# Patient Record
Sex: MALE | Race: BLACK OR AFRICAN AMERICAN | Employment: FULL TIME | URBAN - METROPOLITAN AREA
[De-identification: names, ages, dates, MRNs, and addresses within clinical notes are randomized per-mention and may not be internally consistent; named-entity substitution may affect disease eponyms.]

---

## 2017-01-19 ENCOUNTER — TELEPHONE (OUTPATIENT)
Dept: INTERNAL MEDICINE CLINIC | Age: 47
End: 2017-01-19

## 2017-01-19 ENCOUNTER — HOSPITAL ENCOUNTER (OUTPATIENT)
Dept: OTHER | Age: 47
Discharge: HOME | End: 2017-01-19
Attending: INTERNAL MEDICINE | Admitting: INTERNAL MEDICINE

## 2017-01-19 DIAGNOSIS — M10.9 ACUTE GOUT OF RIGHT FOOT, UNSPECIFIED CAUSE: ICD-10-CM

## 2017-01-19 DIAGNOSIS — M79.671 FOOT PAIN, RIGHT: Primary | ICD-10-CM

## 2017-01-20 ENCOUNTER — OFFICE VISIT (OUTPATIENT)
Dept: INTERNAL MEDICINE CLINIC | Age: 47
End: 2017-01-20

## 2017-01-20 VITALS
DIASTOLIC BLOOD PRESSURE: 98 MMHG | HEART RATE: 84 BPM | WEIGHT: 244 LBS | BODY MASS INDEX: 33.09 KG/M2 | SYSTOLIC BLOOD PRESSURE: 152 MMHG

## 2017-01-20 DIAGNOSIS — Z79.52 LONG TERM (CURRENT) USE OF SYSTEMIC STEROIDS: ICD-10-CM

## 2017-01-20 DIAGNOSIS — F33.41 MAJOR DEPRESSIVE DISORDER, RECURRENT, IN PARTIAL REMISSION (HCC): ICD-10-CM

## 2017-01-20 DIAGNOSIS — E11.9 TYPE 2 DIABETES MELLITUS WITHOUT COMPLICATION, WITHOUT LONG-TERM CURRENT USE OF INSULIN (HCC): ICD-10-CM

## 2017-01-20 DIAGNOSIS — F32.A DEPRESSION, UNSPECIFIED DEPRESSION TYPE: ICD-10-CM

## 2017-01-20 DIAGNOSIS — M10.9 GOUT INVOLVING TOE, UNSPECIFIED CAUSE, UNSPECIFIED CHRONICITY, UNSPECIFIED LATERALITY: Primary | ICD-10-CM

## 2017-01-20 DIAGNOSIS — D86.9 SARCOIDOSIS: ICD-10-CM

## 2017-01-20 LAB
A/G RATIO: 1.4 (ref 1.1–2.2)
ALBUMIN SERPL-MCNC: 4 G/DL (ref 3.4–5)
ALP BLD-CCNC: 128 U/L (ref 40–129)
ALT SERPL-CCNC: 23 U/L (ref 10–40)
ANION GAP SERPL CALCULATED.3IONS-SCNC: 15 MMOL/L (ref 3–16)
AST SERPL-CCNC: 20 U/L (ref 15–37)
BILIRUB SERPL-MCNC: <0.2 MG/DL (ref 0–1)
BUN BLDV-MCNC: 9 MG/DL (ref 7–20)
CALCIUM SERPL-MCNC: 9.4 MG/DL (ref 8.3–10.6)
CHLORIDE BLD-SCNC: 104 MMOL/L (ref 99–110)
CO2: 24 MMOL/L (ref 21–32)
CREAT SERPL-MCNC: 0.7 MG/DL (ref 0.9–1.3)
GFR AFRICAN AMERICAN: >60
GFR NON-AFRICAN AMERICAN: >60
GLOBULIN: 2.8 G/DL
GLUCOSE BLD-MCNC: 108 MG/DL (ref 70–99)
GLUCOSE BLD-MCNC: 116 MG/DL
POTASSIUM SERPL-SCNC: 4.6 MMOL/L (ref 3.5–5.1)
SODIUM BLD-SCNC: 143 MMOL/L (ref 136–145)
TOTAL PROTEIN: 6.8 G/DL (ref 6.4–8.2)
URIC ACID, SERUM: 5.7 MG/DL (ref 3.5–7.2)

## 2017-01-20 PROCEDURE — 82962 GLUCOSE BLOOD TEST: CPT | Performed by: INTERNAL MEDICINE

## 2017-01-20 PROCEDURE — 99214 OFFICE O/P EST MOD 30 MIN: CPT | Performed by: INTERNAL MEDICINE

## 2017-01-20 RX ORDER — LANCETS 30 GAUGE
EACH MISCELLANEOUS
Qty: 50 EACH | Refills: 5 | Status: SHIPPED | OUTPATIENT
Start: 2017-01-20 | End: 2017-04-21 | Stop reason: ALTCHOICE

## 2017-01-20 RX ORDER — MONTELUKAST SODIUM 10 MG/1
10 TABLET ORAL NIGHTLY
Qty: 30 TABLET | Refills: 5 | Status: SHIPPED | OUTPATIENT
Start: 2017-01-20 | End: 2017-04-21 | Stop reason: SDUPTHER

## 2017-01-21 ASSESSMENT — ENCOUNTER SYMPTOMS
GASTROINTESTINAL NEGATIVE: 1
RESPIRATORY NEGATIVE: 1
EYES NEGATIVE: 1
ALLERGIC/IMMUNOLOGIC NEGATIVE: 1

## 2017-01-23 ENCOUNTER — TELEPHONE (OUTPATIENT)
Dept: INTERNAL MEDICINE CLINIC | Age: 47
End: 2017-01-23

## 2017-01-23 DIAGNOSIS — D86.9 SARCOIDOSIS: ICD-10-CM

## 2017-01-23 RX ORDER — PREDNISONE 10 MG/1
10 TABLET ORAL DAILY
Qty: 90 TABLET | Refills: 1 | Status: SHIPPED | OUTPATIENT
Start: 2017-01-23 | End: 2017-08-25 | Stop reason: SDUPTHER

## 2017-02-13 RX ORDER — PROMETHAZINE HYDROCHLORIDE AND CODEINE PHOSPHATE 6.25; 1 MG/5ML; MG/5ML
SYRUP ORAL
Qty: 280 ML | Refills: 0 | OUTPATIENT
Start: 2017-02-13

## 2017-02-20 ENCOUNTER — TELEPHONE (OUTPATIENT)
Dept: INTERNAL MEDICINE CLINIC | Age: 47
End: 2017-02-20

## 2017-02-24 RX ORDER — PROMETHAZINE HYDROCHLORIDE AND CODEINE PHOSPHATE 6.25; 1 MG/5ML; MG/5ML
SYRUP ORAL
Qty: 280 ML | Refills: 0 | Status: SHIPPED | OUTPATIENT
Start: 2017-02-24 | End: 2017-03-31 | Stop reason: SDUPTHER

## 2017-03-10 ENCOUNTER — TELEPHONE (OUTPATIENT)
Dept: INTERNAL MEDICINE CLINIC | Age: 47
End: 2017-03-10

## 2017-03-10 RX ORDER — CEFDINIR 300 MG/1
300 CAPSULE ORAL 2 TIMES DAILY
Qty: 20 CAPSULE | Refills: 0 | Status: SHIPPED | OUTPATIENT
Start: 2017-03-10 | End: 2017-03-20

## 2017-03-31 RX ORDER — PROMETHAZINE HYDROCHLORIDE AND CODEINE PHOSPHATE 6.25; 1 MG/5ML; MG/5ML
SYRUP ORAL
Qty: 280 ML | Refills: 0 | Status: SHIPPED | OUTPATIENT
Start: 2017-03-31 | End: 2017-04-21 | Stop reason: SDUPTHER

## 2017-04-21 ENCOUNTER — OFFICE VISIT (OUTPATIENT)
Dept: INTERNAL MEDICINE CLINIC | Age: 47
End: 2017-04-21

## 2017-04-21 VITALS
WEIGHT: 235 LBS | RESPIRATION RATE: 16 BRPM | BODY MASS INDEX: 31.87 KG/M2 | HEART RATE: 72 BPM | SYSTOLIC BLOOD PRESSURE: 128 MMHG | DIASTOLIC BLOOD PRESSURE: 68 MMHG

## 2017-04-21 DIAGNOSIS — Z79.52 LONG TERM (CURRENT) USE OF SYSTEMIC STEROIDS: ICD-10-CM

## 2017-04-21 DIAGNOSIS — I10 ESSENTIAL HYPERTENSION: ICD-10-CM

## 2017-04-21 DIAGNOSIS — F32.A DEPRESSION, UNSPECIFIED DEPRESSION TYPE: ICD-10-CM

## 2017-04-21 DIAGNOSIS — D86.9 SARCOIDOSIS: ICD-10-CM

## 2017-04-21 DIAGNOSIS — M10.9 GOUT INVOLVING TOE OF RIGHT FOOT, UNSPECIFIED CAUSE, UNSPECIFIED CHRONICITY: Primary | ICD-10-CM

## 2017-04-21 DIAGNOSIS — E11.9 TYPE 2 DIABETES MELLITUS WITHOUT COMPLICATION, WITHOUT LONG-TERM CURRENT USE OF INSULIN (HCC): ICD-10-CM

## 2017-04-21 PROCEDURE — 99214 OFFICE O/P EST MOD 30 MIN: CPT | Performed by: INTERNAL MEDICINE

## 2017-04-21 RX ORDER — MONTELUKAST SODIUM 10 MG/1
10 TABLET ORAL NIGHTLY
Qty: 30 TABLET | Refills: 5 | Status: SHIPPED | OUTPATIENT
Start: 2017-04-21 | End: 2022-05-18

## 2017-04-21 RX ORDER — ATORVASTATIN CALCIUM 20 MG/1
20 TABLET, FILM COATED ORAL DAILY
Qty: 90 TABLET | Refills: 1 | Status: ON HOLD | OUTPATIENT
Start: 2017-04-21 | End: 2019-03-11 | Stop reason: HOSPADM

## 2017-04-21 RX ORDER — ALLOPURINOL 100 MG/1
100 TABLET ORAL DAILY
Qty: 30 TABLET | Refills: 3 | Status: SHIPPED | OUTPATIENT
Start: 2017-04-21

## 2017-04-21 RX ORDER — M-VIT,TX,IRON,MINS/CALC/FOLIC 27MG-0.4MG
1 TABLET ORAL DAILY
Qty: 30 TABLET | Refills: 11 | Status: ON HOLD | OUTPATIENT
Start: 2017-04-21 | End: 2019-03-11 | Stop reason: HOSPADM

## 2017-04-21 RX ORDER — OMEPRAZOLE 20 MG/1
20 CAPSULE, DELAYED RELEASE ORAL DAILY
Qty: 30 CAPSULE | Refills: 3 | Status: SHIPPED | OUTPATIENT
Start: 2017-04-21

## 2017-04-30 ASSESSMENT — ENCOUNTER SYMPTOMS
ALLERGIC/IMMUNOLOGIC NEGATIVE: 1
APNEA: 0
WHEEZING: 0
COUGH: 1
CHEST TIGHTNESS: 1
EYES NEGATIVE: 1
SHORTNESS OF BREATH: 1
GASTROINTESTINAL NEGATIVE: 1
STRIDOR: 0

## 2017-08-18 ENCOUNTER — OFFICE VISIT (OUTPATIENT)
Dept: INTERNAL MEDICINE CLINIC | Age: 47
End: 2017-08-18

## 2017-08-18 VITALS
HEART RATE: 82 BPM | SYSTOLIC BLOOD PRESSURE: 158 MMHG | WEIGHT: 218 LBS | DIASTOLIC BLOOD PRESSURE: 100 MMHG | RESPIRATION RATE: 12 BRPM | BODY MASS INDEX: 29.57 KG/M2

## 2017-08-18 DIAGNOSIS — E11.9 TYPE 2 DIABETES MELLITUS WITHOUT COMPLICATION, WITHOUT LONG-TERM CURRENT USE OF INSULIN (HCC): Primary | ICD-10-CM

## 2017-08-18 DIAGNOSIS — I10 ESSENTIAL HYPERTENSION: ICD-10-CM

## 2017-08-18 DIAGNOSIS — E78.2 MIXED HYPERLIPIDEMIA: ICD-10-CM

## 2017-08-18 DIAGNOSIS — F32.9 MAJOR DEPRESSION, CHRONIC: ICD-10-CM

## 2017-08-18 LAB
A/G RATIO: 1.4 (ref 1.1–2.2)
ALBUMIN SERPL-MCNC: 4.4 G/DL (ref 3.4–5)
ALP BLD-CCNC: 112 U/L (ref 40–129)
ALT SERPL-CCNC: 29 U/L (ref 10–40)
ANION GAP SERPL CALCULATED.3IONS-SCNC: 16 MMOL/L (ref 3–16)
AST SERPL-CCNC: 21 U/L (ref 15–37)
BASOPHILS ABSOLUTE: 0 K/UL (ref 0–0.2)
BASOPHILS RELATIVE PERCENT: 0.4 %
BILIRUB SERPL-MCNC: 0.3 MG/DL (ref 0–1)
BUN BLDV-MCNC: 14 MG/DL (ref 7–20)
CALCIUM SERPL-MCNC: 10 MG/DL (ref 8.3–10.6)
CHLORIDE BLD-SCNC: 105 MMOL/L (ref 99–110)
CHOLESTEROL, TOTAL: 239 MG/DL (ref 0–199)
CO2: 27 MMOL/L (ref 21–32)
CREAT SERPL-MCNC: 1 MG/DL (ref 0.9–1.3)
EOSINOPHILS ABSOLUTE: 0.1 K/UL (ref 0–0.6)
EOSINOPHILS RELATIVE PERCENT: 1 %
GFR AFRICAN AMERICAN: >60
GFR NON-AFRICAN AMERICAN: >60
GLOBULIN: 3.2 G/DL
GLUCOSE BLD-MCNC: 123 MG/DL (ref 70–99)
HCT VFR BLD CALC: 41.1 % (ref 40.5–52.5)
HDLC SERPL-MCNC: 78 MG/DL (ref 40–60)
HEMOGLOBIN: 13.6 G/DL (ref 13.5–17.5)
LDL CHOLESTEROL CALCULATED: 137 MG/DL
LYMPHOCYTES ABSOLUTE: 2.3 K/UL (ref 1–5.1)
LYMPHOCYTES RELATIVE PERCENT: 29.9 %
MCH RBC QN AUTO: 29.3 PG (ref 26–34)
MCHC RBC AUTO-ENTMCNC: 33.2 G/DL (ref 31–36)
MCV RBC AUTO: 88.4 FL (ref 80–100)
MONOCYTES ABSOLUTE: 0.4 K/UL (ref 0–1.3)
MONOCYTES RELATIVE PERCENT: 4.9 %
NEUTROPHILS ABSOLUTE: 5 K/UL (ref 1.7–7.7)
NEUTROPHILS RELATIVE PERCENT: 63.8 %
PDW BLD-RTO: 15.9 % (ref 12.4–15.4)
PLATELET # BLD: 293 K/UL (ref 135–450)
PMV BLD AUTO: 8 FL (ref 5–10.5)
POTASSIUM SERPL-SCNC: 4 MMOL/L (ref 3.5–5.1)
RBC # BLD: 4.64 M/UL (ref 4.2–5.9)
SODIUM BLD-SCNC: 148 MMOL/L (ref 136–145)
TOTAL PROTEIN: 7.6 G/DL (ref 6.4–8.2)
TRIGL SERPL-MCNC: 119 MG/DL (ref 0–150)
VLDLC SERPL CALC-MCNC: 24 MG/DL
WBC # BLD: 7.8 K/UL (ref 4–11)

## 2017-08-18 PROCEDURE — 99214 OFFICE O/P EST MOD 30 MIN: CPT | Performed by: INTERNAL MEDICINE

## 2017-08-18 ASSESSMENT — ENCOUNTER SYMPTOMS
EYES NEGATIVE: 1
GASTROINTESTINAL NEGATIVE: 1
ALLERGIC/IMMUNOLOGIC NEGATIVE: 1
RESPIRATORY NEGATIVE: 1

## 2017-08-19 LAB
ESTIMATED AVERAGE GLUCOSE: 128.4 MG/DL
HBA1C MFR BLD: 6.1 %

## 2017-08-25 ENCOUNTER — TELEPHONE (OUTPATIENT)
Dept: INTERNAL MEDICINE CLINIC | Age: 47
End: 2017-08-25

## 2017-08-25 DIAGNOSIS — D86.9 SARCOIDOSIS: ICD-10-CM

## 2017-08-25 RX ORDER — PREDNISONE 10 MG/1
10 TABLET ORAL DAILY
Qty: 90 TABLET | Refills: 1 | Status: ON HOLD | OUTPATIENT
Start: 2017-08-25 | End: 2019-03-11 | Stop reason: HOSPADM

## 2017-08-25 RX ORDER — PROMETHAZINE HYDROCHLORIDE AND CODEINE PHOSPHATE 6.25; 1 MG/5ML; MG/5ML
SYRUP ORAL
Qty: 280 ML | Refills: 0 | Status: SHIPPED | OUTPATIENT
Start: 2017-08-25 | End: 2019-03-08

## 2017-10-06 ENCOUNTER — OFFICE VISIT (OUTPATIENT)
Dept: INTERNAL MEDICINE CLINIC | Age: 47
End: 2017-10-06

## 2017-10-06 VITALS
BODY MASS INDEX: 29.8 KG/M2 | SYSTOLIC BLOOD PRESSURE: 148 MMHG | HEART RATE: 94 BPM | WEIGHT: 220 LBS | DIASTOLIC BLOOD PRESSURE: 100 MMHG | HEIGHT: 72 IN

## 2017-10-06 DIAGNOSIS — E11.9 TYPE 2 DIABETES MELLITUS WITHOUT COMPLICATION, WITHOUT LONG-TERM CURRENT USE OF INSULIN (HCC): ICD-10-CM

## 2017-10-06 DIAGNOSIS — F32.9 MAJOR DEPRESSION, CHRONIC: Primary | ICD-10-CM

## 2017-10-06 DIAGNOSIS — I10 ESSENTIAL HYPERTENSION: ICD-10-CM

## 2017-10-06 PROCEDURE — 99214 OFFICE O/P EST MOD 30 MIN: CPT | Performed by: INTERNAL MEDICINE

## 2017-10-06 ASSESSMENT — PATIENT HEALTH QUESTIONNAIRE - PHQ9
1. LITTLE INTEREST OR PLEASURE IN DOING THINGS: 3
3. TROUBLE FALLING OR STAYING ASLEEP: 2
8. MOVING OR SPEAKING SO SLOWLY THAT OTHER PEOPLE COULD HAVE NOTICED. OR THE OPPOSITE, BEING SO FIGETY OR RESTLESS THAT YOU HAVE BEEN MOVING AROUND A LOT MORE THAN USUAL: 0
6. FEELING BAD ABOUT YOURSELF - OR THAT YOU ARE A FAILURE OR HAVE LET YOURSELF OR YOUR FAMILY DOWN: 1
10. IF YOU CHECKED OFF ANY PROBLEMS, HOW DIFFICULT HAVE THESE PROBLEMS MADE IT FOR YOU TO DO YOUR WORK, TAKE CARE OF THINGS AT HOME, OR GET ALONG WITH OTHER PEOPLE: 2
SUM OF ALL RESPONSES TO PHQ QUESTIONS 1-9: 18
7. TROUBLE CONCENTRATING ON THINGS, SUCH AS READING THE NEWSPAPER OR WATCHING TELEVISION: 3
5. POOR APPETITE OR OVEREATING: 3
SUM OF ALL RESPONSES TO PHQ9 QUESTIONS 1 & 2: 3
9. THOUGHTS THAT YOU WOULD BE BETTER OFF DEAD, OR OF HURTING YOURSELF: 3
4. FEELING TIRED OR HAVING LITTLE ENERGY: 3

## 2017-10-06 NOTE — MR AVS SNAPSHOT
After Visit Summary             Cristin Dugan   10/6/2017 11:00 AM   Office Visit    Description:  Male : 1970   Provider:  Allyssa Slaughter MD   Department:  [facility] Internal Medicine              Your Follow-Up and Future Appointments         Below is a list of your follow-up and future appointments. This may not be a complete list as you may have made appointments directly with providers that we are not aware of or your providers may have made some for you. Please call your providers to confirm appointments. It is important to keep your appointments. Please bring your current insurance card, photo ID, co-pay, and all medication bottles to your appointment. If self-pay, payment is expected at the time of service. Your To-Do List     Follow-Up    Return in about 3 weeks (around 10/27/2017). Information from Your Visit        Department     Name Address Phone Fax    [facility] [employer] [address] [place] [address] [phone]      You Were Seen for:         Comments    Major depression, chronic   [220687]         Vital Signs     Blood Pressure Pulse Height Weight Body Mass Index Smoking Status    148/100 94 6' (1.829 m) 220 lb (99.8 kg) 29.84 kg/m2 Current Some Day Smoker      Additional Information about your Body Mass Index (BMI)           Your BMI as listed above is considered overweight (25.0-29.9). BMI is an estimate of body fat, calculated from your height and weight. The higher your BMI, the greater your risk of heart disease, high blood pressure, type 2 diabetes, stroke, gallstones, arthritis, sleep apnea, and certain cancers. BMI is not perfect. It may overestimate body fat in athletes and people who are more muscular. If your body fat is high you can improve your BMI by decreasing your calorie intake and becoming more physically active.      Learn more at: [employer]

## 2017-10-22 ASSESSMENT — ENCOUNTER SYMPTOMS
GASTROINTESTINAL NEGATIVE: 1
EYES NEGATIVE: 1
RESPIRATORY NEGATIVE: 1
ALLERGIC/IMMUNOLOGIC NEGATIVE: 1

## 2017-10-22 NOTE — PROGRESS NOTES
Subjective:      Patient ID: Meliton Garcia is a 56 y.o. male. Diabetes   Associated symptoms include fatigue. Hypertension      This patient is here for his three-month followup for diabetes and hypertension. The patient also suffers from major depression. Today this patient is very tearful as he is remains  from his wife that he believes that she is proceeding with filing divorce papers. His wife remove him from the home and filed a restraining order against him His kids have not been able to see them and they will not take his phone calls. Because of his disability due to sarcoidosis he is not able to find employment. Since being away from home which the patient states has been almost 6 months, the patient is basically homeless he has  been staying with his sister with whom he does not get along with very well    Past Medical History:   Diagnosis Date    Bell's palsy 9/5/12    Chronic pain disorder 10/29/10    Depression 7/13/11    Diabetes mellitus type II 3/24/10    Gout 2/15/11    Hypertension 401.9    Long term (current) use of systemic steroids 01/20/2016    Mood swings ([facility] [address] 3/24/10    Sarcoidosis ([facility] [address] 3/7/10     Social History     Social History    Marital status:      Spouse name: N/A    Number of children: N/A    Years of education: N/A     Occupational History    unemployed      Social History Main Topics    Smoking status: Current Some Day Smoker     Packs/day: 0.10     Years: 4.00     Types: Cigarettes    Smokeless tobacco: Not on file    Alcohol use 0.0 oz/week    Drug use:       Comment: marijuana for pain and depression    Sexual activity: Not on file     Other Topics Concern    Not on file     Social History Narrative    No narrative on file       Review of Systems   Constitutional: Positive for activity change and fatigue. Negative for appetite change, chills, diaphoresis and fever. HENT: Negative. Eyes: Negative. Respiratory: Negative.

## 2017-10-24 ENCOUNTER — TELEPHONE (OUTPATIENT)
Dept: INTERNAL MEDICINE CLINIC | Age: 47
End: 2017-10-24

## 2017-10-24 ENCOUNTER — TELEPHONE (OUTPATIENT)
Dept: RHEUMATOLOGY | Age: 47
End: 2017-10-24

## 2017-10-24 RX ORDER — BROMPHENIRAMINE MALEATE, PSEUDOEPHEDRINE HYDROCHLORIDE, AND DEXTROMETHORPHAN HYDROBROMIDE 2; 30; 10 MG/5ML; MG/5ML; MG/5ML
5 SYRUP ORAL 4 TIMES DAILY PRN
Qty: 240 ML | Refills: 1 | COMMUNITY
Start: 2017-10-24 | End: 2017-11-07

## 2017-10-24 NOTE — TELEPHONE ENCOUNTER
Patient calling stating that he has a cough that started Saturday night. Cough,producing clear phlegm. Up all night coughing. Nasal congestion, no fever. No sore throat. Headache. Tried DayQuil, Caterina Cost Plus but it didn't help. States is still down so he had to cancel appt for tomorrow. Wants something called in because he has no way to get here. Can't get it fixed until November 15th. Will make an appt then.     [address] [place] [address]    Please advise [phone]

## 2019-03-07 ENCOUNTER — HOSPITAL ENCOUNTER (INPATIENT)
Age: 49
LOS: 3 days | Discharge: HOME | DRG: 690 | End: 2019-03-11
Attending: SPECIALIST | Admitting: SPECIALIST
Payer: MEDICARE

## 2019-03-07 DIAGNOSIS — N30.91 HEMORRHAGIC CYSTITIS: Primary | ICD-10-CM

## 2019-03-07 LAB
BASOPHILS ABSOLUTE: 0.1 K/UL (ref 0–0.2)
BASOPHILS RELATIVE PERCENT: 0.9 %
EOSINOPHILS ABSOLUTE: 0.1 K/UL (ref 0–0.6)
EOSINOPHILS RELATIVE PERCENT: 1.6 %
HCT VFR BLD CALC: 38.5 % (ref 40.5–52.5)
HEMOGLOBIN: 12.7 G/DL (ref 13.5–17.5)
LYMPHOCYTES ABSOLUTE: 3.7 K/UL (ref 1–5.1)
LYMPHOCYTES RELATIVE PERCENT: 39 %
MCH RBC QN AUTO: 30.3 PG (ref 26–34)
MCHC RBC AUTO-ENTMCNC: 32.9 G/DL (ref 31–36)
MCV RBC AUTO: 92.3 FL (ref 80–100)
MONOCYTES ABSOLUTE: 0.8 K/UL (ref 0–1.3)
MONOCYTES RELATIVE PERCENT: 8.3 %
NEUTROPHILS ABSOLUTE: 4.7 K/UL (ref 1.7–7.7)
NEUTROPHILS RELATIVE PERCENT: 50.2 %
PDW BLD-RTO: 15.4 % (ref 12.4–15.4)
PLATELET # BLD: 247 K/UL (ref 135–450)
PMV BLD AUTO: 7.9 FL (ref 5–10.5)
RBC # BLD: 4.18 M/UL (ref 4.2–5.9)
WBC # BLD: 9.4 K/UL (ref 4–11)

## 2019-03-07 PROCEDURE — 99284 EMERGENCY DEPT VISIT MOD MDM: CPT

## 2019-03-07 PROCEDURE — 87086 URINE CULTURE/COLONY COUNT: CPT

## 2019-03-07 PROCEDURE — 81001 URINALYSIS AUTO W/SCOPE: CPT

## 2019-03-07 PROCEDURE — 85025 COMPLETE CBC W/AUTO DIFF WBC: CPT

## 2019-03-07 PROCEDURE — 80053 COMPREHEN METABOLIC PANEL: CPT

## 2019-03-07 PROCEDURE — 51702 INSERT TEMP BLADDER CATH: CPT

## 2019-03-07 PROCEDURE — 83690 ASSAY OF LIPASE: CPT

## 2019-03-07 ASSESSMENT — PAIN SCALES - GENERAL: PAINLEVEL_OUTOF10: 10

## 2019-03-07 ASSESSMENT — PAIN DESCRIPTION - LOCATION: LOCATION: ABDOMEN

## 2019-03-08 PROBLEM — N39.0 UTI (URINARY TRACT INFECTION): Status: ACTIVE | Noted: 2019-03-08

## 2019-03-08 LAB
A/G RATIO: 1.3 (ref 1.1–2.2)
ALBUMIN SERPL-MCNC: 4 G/DL (ref 3.4–5)
ALP BLD-CCNC: 66 U/L (ref 40–129)
ALT SERPL-CCNC: 12 U/L (ref 10–40)
ANION GAP SERPL CALCULATED.3IONS-SCNC: 15 MMOL/L (ref 3–16)
AST SERPL-CCNC: 17 U/L (ref 15–37)
BACTERIA: ABNORMAL /HPF
BILIRUB SERPL-MCNC: 0.3 MG/DL (ref 0–1)
BILIRUBIN URINE: ABNORMAL
BLOOD, URINE: ABNORMAL
BUN BLDV-MCNC: 15 MG/DL (ref 7–20)
CALCIUM SERPL-MCNC: 9.6 MG/DL (ref 8.3–10.6)
CHLORIDE BLD-SCNC: 101 MMOL/L (ref 99–110)
CLARITY: ABNORMAL
CO2: 22 MMOL/L (ref 21–32)
COLOR: ABNORMAL
COMMENT UA: ABNORMAL
CREAT SERPL-MCNC: 0.8 MG/DL (ref 0.9–1.3)
EPITHELIAL CELLS, UA: 5 /HPF (ref 0–5)
GFR AFRICAN AMERICAN: >60
GFR NON-AFRICAN AMERICAN: >60
GLOBULIN: 3.2 G/DL
GLUCOSE BLD-MCNC: 121 MG/DL (ref 70–99)
GLUCOSE BLD-MCNC: 145 MG/DL (ref 70–99)
GLUCOSE URINE: NEGATIVE MG/DL
KETONES, URINE: ABNORMAL MG/DL
LEUKOCYTE ESTERASE, URINE: ABNORMAL
LIPASE: 29 U/L (ref 13–60)
MICROSCOPIC EXAMINATION: YES
NITRITE, URINE: POSITIVE
PERFORMED ON: ABNORMAL
PH UA: 6.5 (ref 5–8)
POTASSIUM SERPL-SCNC: 3.5 MMOL/L (ref 3.5–5.1)
PROTEIN UA: >=300 MG/DL
RBC UA: >900 /HPF (ref 0–4)
SODIUM BLD-SCNC: 138 MMOL/L (ref 136–145)
SPECIFIC GRAVITY UA: >=1.03 (ref 1–1.03)
TOTAL PROTEIN: 7.2 G/DL (ref 6.4–8.2)
URINE REFLEX TO CULTURE: YES
URINE TYPE: ABNORMAL
UROBILINOGEN, URINE: 2 E.U./DL
WBC UA: 19 /HPF (ref 0–5)

## 2019-03-08 PROCEDURE — 94760 N-INVAS EAR/PLS OXIMETRY 1: CPT

## 2019-03-08 PROCEDURE — 1200000000 HC SEMI PRIVATE

## 2019-03-08 PROCEDURE — 6360000002 HC RX W HCPCS: Performed by: SPECIALIST

## 2019-03-08 PROCEDURE — 6370000000 HC RX 637 (ALT 250 FOR IP): Performed by: PHYSICIAN ASSISTANT

## 2019-03-08 PROCEDURE — 6360000002 HC RX W HCPCS: Performed by: PHYSICIAN ASSISTANT

## 2019-03-08 PROCEDURE — 96365 THER/PROPH/DIAG IV INF INIT: CPT

## 2019-03-08 PROCEDURE — 2580000003 HC RX 258: Performed by: PHYSICIAN ASSISTANT

## 2019-03-08 PROCEDURE — 6370000000 HC RX 637 (ALT 250 FOR IP): Performed by: SPECIALIST

## 2019-03-08 PROCEDURE — 2580000003 HC RX 258: Performed by: SPECIALIST

## 2019-03-08 RX ORDER — HYDROCODONE BITARTRATE AND ACETAMINOPHEN 5; 325 MG/1; MG/1
2 TABLET ORAL EVERY 4 HOURS PRN
Status: DISCONTINUED | OUTPATIENT
Start: 2019-03-08 | End: 2019-03-11 | Stop reason: HOSPADM

## 2019-03-08 RX ORDER — HYDROCODONE BITARTRATE AND ACETAMINOPHEN 5; 325 MG/1; MG/1
1 TABLET ORAL EVERY 4 HOURS PRN
Status: DISCONTINUED | OUTPATIENT
Start: 2019-03-08 | End: 2019-03-11 | Stop reason: HOSPADM

## 2019-03-08 RX ORDER — PANTOPRAZOLE SODIUM 40 MG/1
40 TABLET, DELAYED RELEASE ORAL
Status: DISCONTINUED | OUTPATIENT
Start: 2019-03-08 | End: 2019-03-11 | Stop reason: HOSPADM

## 2019-03-08 RX ORDER — ALLOPURINOL 100 MG/1
100 TABLET ORAL DAILY
Status: DISCONTINUED | OUTPATIENT
Start: 2019-03-08 | End: 2019-03-11 | Stop reason: HOSPADM

## 2019-03-08 RX ORDER — HYDRALAZINE HYDROCHLORIDE 20 MG/ML
5 INJECTION INTRAMUSCULAR; INTRAVENOUS EVERY 6 HOURS PRN
Status: DISCONTINUED | OUTPATIENT
Start: 2019-03-08 | End: 2019-03-11 | Stop reason: HOSPADM

## 2019-03-08 RX ORDER — AMLODIPINE BESYLATE 10 MG/1
10 TABLET ORAL DAILY
Status: DISCONTINUED | OUTPATIENT
Start: 2019-03-08 | End: 2019-03-11 | Stop reason: HOSPADM

## 2019-03-08 RX ORDER — DEXTROSE AND SODIUM CHLORIDE 5; .45 G/100ML; G/100ML
INJECTION, SOLUTION INTRAVENOUS CONTINUOUS
Status: DISCONTINUED | OUTPATIENT
Start: 2019-03-08 | End: 2019-03-11 | Stop reason: HOSPADM

## 2019-03-08 RX ORDER — PREDNISONE 10 MG/1
10 TABLET ORAL DAILY
Status: DISCONTINUED | OUTPATIENT
Start: 2019-03-08 | End: 2019-03-11 | Stop reason: HOSPADM

## 2019-03-08 RX ORDER — M-VIT,TX,IRON,MINS/CALC/FOLIC 27MG-0.4MG
1 TABLET ORAL DAILY
Status: DISCONTINUED | OUTPATIENT
Start: 2019-03-08 | End: 2019-03-11 | Stop reason: HOSPADM

## 2019-03-08 RX ORDER — ATORVASTATIN CALCIUM 20 MG/1
20 TABLET, FILM COATED ORAL NIGHTLY
Status: DISCONTINUED | OUTPATIENT
Start: 2019-03-08 | End: 2019-03-11 | Stop reason: HOSPADM

## 2019-03-08 RX ORDER — MONTELUKAST SODIUM 10 MG/1
10 TABLET ORAL NIGHTLY
Status: DISCONTINUED | OUTPATIENT
Start: 2019-03-08 | End: 2019-03-11 | Stop reason: HOSPADM

## 2019-03-08 RX ORDER — ALBUTEROL SULFATE 90 UG/1
2 AEROSOL, METERED RESPIRATORY (INHALATION) EVERY 6 HOURS PRN
Status: DISCONTINUED | OUTPATIENT
Start: 2019-03-08 | End: 2019-03-11 | Stop reason: HOSPADM

## 2019-03-08 RX ORDER — AMLODIPINE BESYLATE 5 MG/1
10 TABLET ORAL ONCE
Status: COMPLETED | OUTPATIENT
Start: 2019-03-08 | End: 2019-03-08

## 2019-03-08 RX ADMIN — CEFTRIAXONE 1 G: 1 INJECTION, POWDER, FOR SOLUTION INTRAMUSCULAR; INTRAVENOUS at 00:57

## 2019-03-08 RX ADMIN — DEXTROSE AND SODIUM CHLORIDE: 5; 450 INJECTION, SOLUTION INTRAVENOUS at 04:47

## 2019-03-08 RX ADMIN — ATORVASTATIN CALCIUM 20 MG: 20 TABLET, FILM COATED ORAL at 21:07

## 2019-03-08 RX ADMIN — DEXTROSE AND SODIUM CHLORIDE: 5; 450 INJECTION, SOLUTION INTRAVENOUS at 14:39

## 2019-03-08 RX ADMIN — HYDRALAZINE HYDROCHLORIDE 5 MG: 20 INJECTION INTRAMUSCULAR; INTRAVENOUS at 10:28

## 2019-03-08 RX ADMIN — PREDNISONE 10 MG: 10 TABLET ORAL at 09:38

## 2019-03-08 RX ADMIN — AMLODIPINE BESYLATE 10 MG: 10 TABLET ORAL at 09:38

## 2019-03-08 RX ADMIN — HYDROCODONE BITARTRATE AND ACETAMINOPHEN 2 TABLET: 5; 325 TABLET ORAL at 14:39

## 2019-03-08 RX ADMIN — MONTELUKAST SODIUM 10 MG: 10 TABLET, FILM COATED ORAL at 21:07

## 2019-03-08 RX ADMIN — MULTIPLE VITAMINS W/ MINERALS TAB 1 TABLET: TAB at 09:38

## 2019-03-08 RX ADMIN — VORTIOXETINE 10 MG: 10 TABLET, FILM COATED ORAL at 09:38

## 2019-03-08 RX ADMIN — AMLODIPINE BESYLATE 10 MG: 5 TABLET ORAL at 00:38

## 2019-03-08 RX ADMIN — ALLOPURINOL 100 MG: 100 TABLET ORAL at 09:38

## 2019-03-08 RX ADMIN — HYDROCODONE BITARTRATE AND ACETAMINOPHEN 1 TABLET: 5; 325 TABLET ORAL at 21:57

## 2019-03-08 RX ADMIN — HYDROCODONE BITARTRATE AND ACETAMINOPHEN 2 TABLET: 5; 325 TABLET ORAL at 10:09

## 2019-03-08 ASSESSMENT — ENCOUNTER SYMPTOMS
ABDOMINAL PAIN: 1
NAUSEA: 0
SHORTNESS OF BREATH: 0
FACIAL SWELLING: 0
EYE DISCHARGE: 0
BACK PAIN: 0
EYE REDNESS: 0
APNEA: 0
VOMITING: 0
CHOKING: 0

## 2019-03-08 ASSESSMENT — PAIN SCALES - GENERAL
PAINLEVEL_OUTOF10: 8
PAINLEVEL_OUTOF10: 4
PAINLEVEL_OUTOF10: 7
PAINLEVEL_OUTOF10: 2
PAINLEVEL_OUTOF10: 10
PAINLEVEL_OUTOF10: 5
PAINLEVEL_OUTOF10: 7

## 2019-03-08 ASSESSMENT — PAIN DESCRIPTION - FREQUENCY: FREQUENCY: INTERMITTENT

## 2019-03-08 ASSESSMENT — PAIN DESCRIPTION - DESCRIPTORS: DESCRIPTORS: SHARP

## 2019-03-08 ASSESSMENT — PAIN DESCRIPTION - PAIN TYPE: TYPE: ACUTE PAIN

## 2019-03-08 ASSESSMENT — PAIN DESCRIPTION - LOCATION
LOCATION: ABDOMEN
LOCATION: ABDOMEN

## 2019-03-08 ASSESSMENT — PAIN DESCRIPTION - ONSET: ONSET: ON-GOING

## 2019-03-09 LAB — URINE CULTURE, ROUTINE: NORMAL

## 2019-03-09 PROCEDURE — 99233 SBSQ HOSP IP/OBS HIGH 50: CPT | Performed by: INTERNAL MEDICINE

## 2019-03-09 PROCEDURE — 6370000000 HC RX 637 (ALT 250 FOR IP): Performed by: INTERNAL MEDICINE

## 2019-03-09 PROCEDURE — 6360000002 HC RX W HCPCS: Performed by: SPECIALIST

## 2019-03-09 PROCEDURE — 2580000003 HC RX 258: Performed by: SPECIALIST

## 2019-03-09 PROCEDURE — 1200000000 HC SEMI PRIVATE

## 2019-03-09 PROCEDURE — 6370000000 HC RX 637 (ALT 250 FOR IP): Performed by: SPECIALIST

## 2019-03-09 PROCEDURE — 6360000002 HC RX W HCPCS: Performed by: INTERNAL MEDICINE

## 2019-03-09 RX ORDER — ONDANSETRON 2 MG/ML
4 INJECTION INTRAMUSCULAR; INTRAVENOUS EVERY 6 HOURS PRN
Status: DISCONTINUED | OUTPATIENT
Start: 2019-03-09 | End: 2019-03-11 | Stop reason: HOSPADM

## 2019-03-09 RX ADMIN — PANTOPRAZOLE SODIUM 40 MG: 40 TABLET, DELAYED RELEASE ORAL at 06:53

## 2019-03-09 RX ADMIN — CEFTRIAXONE 1 G: 1 INJECTION, POWDER, FOR SOLUTION INTRAMUSCULAR; INTRAVENOUS at 23:08

## 2019-03-09 RX ADMIN — DEXTROSE AND SODIUM CHLORIDE: 5; 450 INJECTION, SOLUTION INTRAVENOUS at 00:21

## 2019-03-09 RX ADMIN — ATORVASTATIN CALCIUM 20 MG: 20 TABLET, FILM COATED ORAL at 20:05

## 2019-03-09 RX ADMIN — CEFTRIAXONE 1 G: 1 INJECTION, POWDER, FOR SOLUTION INTRAMUSCULAR; INTRAVENOUS at 00:19

## 2019-03-09 RX ADMIN — AMLODIPINE BESYLATE 10 MG: 10 TABLET ORAL at 08:20

## 2019-03-09 RX ADMIN — MONTELUKAST SODIUM 10 MG: 10 TABLET, FILM COATED ORAL at 20:05

## 2019-03-09 RX ADMIN — MAGNESIUM HYDROXIDE 30 ML: 400 SUSPENSION ORAL at 12:49

## 2019-03-09 RX ADMIN — MULTIPLE VITAMINS W/ MINERALS TAB 1 TABLET: TAB at 08:19

## 2019-03-09 RX ADMIN — DEXTROSE AND SODIUM CHLORIDE: 5; 450 INJECTION, SOLUTION INTRAVENOUS at 11:23

## 2019-03-09 RX ADMIN — DEXTROSE AND SODIUM CHLORIDE: 5; 450 INJECTION, SOLUTION INTRAVENOUS at 20:10

## 2019-03-09 RX ADMIN — HYDROCODONE BITARTRATE AND ACETAMINOPHEN 2 TABLET: 5; 325 TABLET ORAL at 20:05

## 2019-03-09 RX ADMIN — ALLOPURINOL 100 MG: 100 TABLET ORAL at 08:19

## 2019-03-09 RX ADMIN — PREDNISONE 10 MG: 10 TABLET ORAL at 08:20

## 2019-03-09 RX ADMIN — ONDANSETRON 4 MG: 2 INJECTION INTRAMUSCULAR; INTRAVENOUS at 12:49

## 2019-03-09 RX ADMIN — VORTIOXETINE 10 MG: 10 TABLET, FILM COATED ORAL at 08:47

## 2019-03-09 RX ADMIN — HYDROCODONE BITARTRATE AND ACETAMINOPHEN 1 TABLET: 5; 325 TABLET ORAL at 11:22

## 2019-03-09 ASSESSMENT — PAIN SCALES - GENERAL
PAINLEVEL_OUTOF10: 6
PAINLEVEL_OUTOF10: 6
PAINLEVEL_OUTOF10: 4
PAINLEVEL_OUTOF10: 3

## 2019-03-10 LAB
A/G RATIO: 1.2 (ref 1.1–2.2)
ALBUMIN SERPL-MCNC: 4 G/DL (ref 3.4–5)
ALP BLD-CCNC: 78 U/L (ref 40–129)
ALT SERPL-CCNC: 10 U/L (ref 10–40)
ANION GAP SERPL CALCULATED.3IONS-SCNC: 14 MMOL/L (ref 3–16)
AST SERPL-CCNC: 11 U/L (ref 15–37)
BILIRUB SERPL-MCNC: 0.4 MG/DL (ref 0–1)
BUN BLDV-MCNC: 6 MG/DL (ref 7–20)
CALCIUM SERPL-MCNC: 9.2 MG/DL (ref 8.3–10.6)
CHLORIDE BLD-SCNC: 100 MMOL/L (ref 99–110)
CO2: 24 MMOL/L (ref 21–32)
CREAT SERPL-MCNC: 0.7 MG/DL (ref 0.9–1.3)
GFR AFRICAN AMERICAN: >60
GFR NON-AFRICAN AMERICAN: >60
GLOBULIN: 3.4 G/DL
GLUCOSE BLD-MCNC: 114 MG/DL (ref 70–99)
HCT VFR BLD CALC: 41.9 % (ref 40.5–52.5)
HEMOGLOBIN: 14.1 G/DL (ref 13.5–17.5)
MCH RBC QN AUTO: 30.7 PG (ref 26–34)
MCHC RBC AUTO-ENTMCNC: 33.6 G/DL (ref 31–36)
MCV RBC AUTO: 91.5 FL (ref 80–100)
PDW BLD-RTO: 15.1 % (ref 12.4–15.4)
PLATELET # BLD: 264 K/UL (ref 135–450)
PMV BLD AUTO: 7.5 FL (ref 5–10.5)
POTASSIUM SERPL-SCNC: 3.6 MMOL/L (ref 3.5–5.1)
RBC # BLD: 4.58 M/UL (ref 4.2–5.9)
SODIUM BLD-SCNC: 138 MMOL/L (ref 136–145)
TOTAL PROTEIN: 7.4 G/DL (ref 6.4–8.2)
WBC # BLD: 9.2 K/UL (ref 4–11)

## 2019-03-10 PROCEDURE — 2580000003 HC RX 258: Performed by: SPECIALIST

## 2019-03-10 PROCEDURE — 99232 SBSQ HOSP IP/OBS MODERATE 35: CPT | Performed by: INTERNAL MEDICINE

## 2019-03-10 PROCEDURE — 51798 US URINE CAPACITY MEASURE: CPT

## 2019-03-10 PROCEDURE — 6370000000 HC RX 637 (ALT 250 FOR IP): Performed by: SPECIALIST

## 2019-03-10 PROCEDURE — 1200000000 HC SEMI PRIVATE

## 2019-03-10 PROCEDURE — 85027 COMPLETE CBC AUTOMATED: CPT

## 2019-03-10 PROCEDURE — 80053 COMPREHEN METABOLIC PANEL: CPT

## 2019-03-10 PROCEDURE — 6370000000 HC RX 637 (ALT 250 FOR IP): Performed by: UROLOGY

## 2019-03-10 PROCEDURE — 6360000002 HC RX W HCPCS: Performed by: SPECIALIST

## 2019-03-10 RX ORDER — BISACODYL 10 MG
10 SUPPOSITORY, RECTAL RECTAL DAILY PRN
Status: DISCONTINUED | OUTPATIENT
Start: 2019-03-10 | End: 2019-03-11 | Stop reason: HOSPADM

## 2019-03-10 RX ADMIN — BISACODYL 10 MG: 10 SUPPOSITORY RECTAL at 21:25

## 2019-03-10 RX ADMIN — PANTOPRAZOLE SODIUM 40 MG: 40 TABLET, DELAYED RELEASE ORAL at 05:42

## 2019-03-10 RX ADMIN — MULTIPLE VITAMINS W/ MINERALS TAB 1 TABLET: TAB at 08:30

## 2019-03-10 RX ADMIN — PREDNISONE 10 MG: 10 TABLET ORAL at 08:30

## 2019-03-10 RX ADMIN — HYDROCODONE BITARTRATE AND ACETAMINOPHEN 2 TABLET: 5; 325 TABLET ORAL at 10:14

## 2019-03-10 RX ADMIN — ALLOPURINOL 100 MG: 100 TABLET ORAL at 08:30

## 2019-03-10 RX ADMIN — DEXTROSE AND SODIUM CHLORIDE: 5; 450 INJECTION, SOLUTION INTRAVENOUS at 08:31

## 2019-03-10 RX ADMIN — AMLODIPINE BESYLATE 10 MG: 10 TABLET ORAL at 08:30

## 2019-03-10 RX ADMIN — CEFTRIAXONE 1 G: 1 INJECTION, POWDER, FOR SOLUTION INTRAMUSCULAR; INTRAVENOUS at 23:14

## 2019-03-10 RX ADMIN — ATORVASTATIN CALCIUM 20 MG: 20 TABLET, FILM COATED ORAL at 21:24

## 2019-03-10 RX ADMIN — MONTELUKAST SODIUM 10 MG: 10 TABLET, FILM COATED ORAL at 21:24

## 2019-03-10 RX ADMIN — VORTIOXETINE 10 MG: 10 TABLET, FILM COATED ORAL at 08:30

## 2019-03-10 ASSESSMENT — PAIN DESCRIPTION - DESCRIPTORS: DESCRIPTORS: ACHING

## 2019-03-10 ASSESSMENT — PAIN DESCRIPTION - LOCATION: LOCATION: ABDOMEN;HEAD

## 2019-03-10 ASSESSMENT — PAIN DESCRIPTION - ORIENTATION: ORIENTATION: LOWER

## 2019-03-10 ASSESSMENT — PAIN SCALES - GENERAL
PAINLEVEL_OUTOF10: 0
PAINLEVEL_OUTOF10: 7
PAINLEVEL_OUTOF10: 7
PAINLEVEL_OUTOF10: 4

## 2019-03-10 ASSESSMENT — PAIN DESCRIPTION - ONSET: ONSET: GRADUAL

## 2019-03-10 ASSESSMENT — PAIN DESCRIPTION - PAIN TYPE: TYPE: ACUTE PAIN

## 2019-03-10 ASSESSMENT — PAIN DESCRIPTION - FREQUENCY: FREQUENCY: INTERMITTENT

## 2019-03-11 VITALS
HEIGHT: 72 IN | BODY MASS INDEX: 25.35 KG/M2 | WEIGHT: 187.17 LBS | TEMPERATURE: 98.6 F | OXYGEN SATURATION: 98 % | RESPIRATION RATE: 17 BRPM | SYSTOLIC BLOOD PRESSURE: 135 MMHG | DIASTOLIC BLOOD PRESSURE: 88 MMHG | HEART RATE: 89 BPM

## 2019-03-11 PROCEDURE — 6370000000 HC RX 637 (ALT 250 FOR IP): Performed by: SPECIALIST

## 2019-03-11 RX ORDER — ALBUTEROL SULFATE 90 UG/1
2 AEROSOL, METERED RESPIRATORY (INHALATION) EVERY 6 HOURS PRN
Qty: 1 INHALER | Refills: 3 | Status: SHIPPED | OUTPATIENT
Start: 2019-03-11 | End: 2019-04-03

## 2019-03-11 RX ORDER — BISACODYL 10 MG
10 SUPPOSITORY, RECTAL RECTAL DAILY PRN
Qty: 30 SUPPOSITORY | Refills: 1 | Status: SHIPPED | OUTPATIENT
Start: 2019-03-11 | End: 2019-04-03

## 2019-03-11 RX ORDER — ATORVASTATIN CALCIUM 20 MG/1
20 TABLET, FILM COATED ORAL NIGHTLY
Qty: 30 TABLET | Refills: 3 | Status: SHIPPED | OUTPATIENT
Start: 2019-03-11 | End: 2019-04-03

## 2019-03-11 RX ORDER — M-VIT,TX,IRON,MINS/CALC/FOLIC 27MG-0.4MG
1 TABLET ORAL DAILY
Qty: 30 TABLET | Refills: 1 | Status: SHIPPED | OUTPATIENT
Start: 2019-03-11 | End: 2019-04-03

## 2019-03-11 RX ADMIN — VORTIOXETINE 10 MG: 10 TABLET, FILM COATED ORAL at 08:08

## 2019-03-11 RX ADMIN — PREDNISONE 10 MG: 10 TABLET ORAL at 08:08

## 2019-03-11 RX ADMIN — ALLOPURINOL 100 MG: 100 TABLET ORAL at 08:08

## 2019-03-11 RX ADMIN — PANTOPRAZOLE SODIUM 40 MG: 40 TABLET, DELAYED RELEASE ORAL at 08:08

## 2019-03-11 RX ADMIN — AMLODIPINE BESYLATE 10 MG: 10 TABLET ORAL at 08:08

## 2019-03-11 RX ADMIN — MULTIPLE VITAMINS W/ MINERALS TAB 1 TABLET: TAB at 08:08

## 2019-03-12 ENCOUNTER — CARE COORDINATION (OUTPATIENT)
Dept: CASE MANAGEMENT | Age: 49
End: 2019-03-12

## 2019-04-02 ENCOUNTER — CARE COORDINATION (OUTPATIENT)
Dept: CARE COORDINATION | Age: 49
End: 2019-04-02

## 2019-04-03 NOTE — PROGRESS NOTES
[address]  time_130 pt states___________        Surgery time____________    Take the following medications with a sip of water: bp medication only    Do not eat or drink anything after 12:00 midnight prior to your surgery. This includes water chewing gum, mints and ice chips. You may brush your teeth and gargle the morning of your surgery, but do not swallow the water     Please see your family doctor/pediatrician for a history and physical and/or concerning medications. Bring any test results/reports from your physicians office. If you are under the care of a heart doctor or specialist doctor, please be aware that you may be asked to them for clearance    You may be asked to stop blood thinners such as Coumadin, Plavix, Fragmin, Lovenox, etc., or any anti-inflammatories such as:  Aspirin, Ibuprofen, Advil, Naproxen prior to your surgery. We also ask that you stop any OTC medications such as fish oil, vitamin E, glucosamine, garlic, Multivitamins, COQ 10, etc.    We ask that you do not smoke 24 hours prior to surgery  We ask that you do not  drink any alcoholic beverages 24 hours prior to surgery     You must make arrangements for a responsible adult to take you home after your surgery. For your safety you will not be allowed to leave alone or drive yourself home. Your surgery will be cancelled if you do not have a ride home. Also for your safety, it is strongly suggested that someone stay with you the first 24 hours after your surgery. A parent or legal guardian must accompany a child scheduled for surgery and plan to stay at the hospital until the child is discharged. Please do not bring other children with you. For your comfort, please wear simple loose fitting clothing to the hospital.  Please do not bring valuables.     Do not wear any make-up or nail polish on your fingers or toes      For your safety, please do not wear any jewelry or body piercing's on the day of surgery. All jewelry must be removed. If you have dentures, they will be removed before going to operating room. For your convenience, we will provide you with a container. If you wear contact lenses or glasses, they will be removed, please bring a case for them. If you have a living will and a durable power of  for healthcare, please bring in a copy. As part of our patient safety program to minimize surgical site infections, we ask you to do the following:    · Please notify your surgeon if you develop any illness between         now and the  day of your surgery. · This includes a cough, cold, fever, sore throat, nausea,         or vomiting, and diarrhea, etc.  ·  Please notify your surgeon if you experience dizziness, shortness         of breath or blurred vision between now and the time of your surgery. Do not shave your operative site 96 hours prior to surgery. For face and neck surgery, men may use an electric razor 48 hours   prior to surgery. You may shower the night before surgery or the morning of   your surgery with an antibacterial soap. You will need to bring a photo ID and insurance card    [facility] has an onsite pharmacy, would you like to utilize our pharmacy     If you will be staying overnight and use a C-pap machine, please bring   your C-pap to hospital     Our goal is to provide you with excellent care, therefore, visitors will be limited to two(2) in the room at a time so that we may focus on providing this care for you. Please contact pre-admission testing if you have any further questions. [facility] phone number:  [phone] PAT fax number:  [phone]  Please note these are generalized instructions for all surgical cases, you may be provided with more specific instructions according to your surgery.

## 2019-04-05 ENCOUNTER — ANESTHESIA EVENT (OUTPATIENT)
Dept: OPERATING ROOM | Age: 49
End: 2019-04-05
Payer: MEDICARE

## 2019-04-07 PROBLEM — N39.0 UTI (URINARY TRACT INFECTION): Status: RESOLVED | Noted: 2019-03-08 | Resolved: 2019-04-07

## 2019-04-08 ENCOUNTER — ANESTHESIA (OUTPATIENT)
Dept: OPERATING ROOM | Age: 49
End: 2019-04-08
Payer: MEDICARE

## 2019-04-08 ENCOUNTER — HOSPITAL ENCOUNTER (OUTPATIENT)
Dept: CT IMAGING | Age: 49
Discharge: HOME | End: 2019-04-08
Payer: MEDICARE

## 2019-04-08 ENCOUNTER — HOSPITAL ENCOUNTER (OUTPATIENT)
Age: 49
Setting detail: OUTPATIENT SURGERY
Discharge: HOME | End: 2019-04-08
Attending: UROLOGY | Admitting: UROLOGY
Payer: MEDICARE

## 2019-04-08 VITALS
HEART RATE: 55 BPM | BODY MASS INDEX: 25.9 KG/M2 | HEIGHT: 72 IN | OXYGEN SATURATION: 98 % | WEIGHT: 191.25 LBS | TEMPERATURE: 98 F | SYSTOLIC BLOOD PRESSURE: 160 MMHG | RESPIRATION RATE: 16 BRPM | DIASTOLIC BLOOD PRESSURE: 90 MMHG

## 2019-04-08 VITALS — SYSTOLIC BLOOD PRESSURE: 139 MMHG | DIASTOLIC BLOOD PRESSURE: 76 MMHG | OXYGEN SATURATION: 88 %

## 2019-04-08 DIAGNOSIS — R31.0 GROSS HEMATURIA: ICD-10-CM

## 2019-04-08 DIAGNOSIS — R39.198 DIFFICULTY URINATING: ICD-10-CM

## 2019-04-08 DIAGNOSIS — R33.9 BLADDER RETENTION OF URINE: ICD-10-CM

## 2019-04-08 PROCEDURE — 6360000002 HC RX W HCPCS: Performed by: NURSE ANESTHETIST, CERTIFIED REGISTERED

## 2019-04-08 PROCEDURE — 6360000002 HC RX W HCPCS: Performed by: UROLOGY

## 2019-04-08 PROCEDURE — 7100000000 HC PACU RECOVERY - FIRST 15 MIN: Performed by: UROLOGY

## 2019-04-08 PROCEDURE — 7100000011 HC PHASE II RECOVERY - ADDTL 15 MIN: Performed by: UROLOGY

## 2019-04-08 PROCEDURE — 7100000001 HC PACU RECOVERY - ADDTL 15 MIN: Performed by: UROLOGY

## 2019-04-08 PROCEDURE — 74178 CT ABD&PLV WO CNTR FLWD CNTR: CPT

## 2019-04-08 PROCEDURE — 2580000003 HC RX 258: Performed by: UROLOGY

## 2019-04-08 PROCEDURE — 3600000004 HC SURGERY LEVEL 4 BASE: Performed by: UROLOGY

## 2019-04-08 PROCEDURE — 7100000010 HC PHASE II RECOVERY - FIRST 15 MIN: Performed by: UROLOGY

## 2019-04-08 PROCEDURE — 3700000001 HC ADD 15 MINUTES (ANESTHESIA): Performed by: UROLOGY

## 2019-04-08 PROCEDURE — 3600000014 HC SURGERY LEVEL 4 ADDTL 15MIN: Performed by: UROLOGY

## 2019-04-08 PROCEDURE — 6360000004 HC RX CONTRAST MEDICATION: Performed by: UROLOGY

## 2019-04-08 PROCEDURE — 2500000003 HC RX 250 WO HCPCS: Performed by: NURSE ANESTHETIST, CERTIFIED REGISTERED

## 2019-04-08 PROCEDURE — 2709999900 HC NON-CHARGEABLE SUPPLY: Performed by: UROLOGY

## 2019-04-08 PROCEDURE — 2580000003 HC RX 258: Performed by: ANESTHESIOLOGY

## 2019-04-08 PROCEDURE — 3700000000 HC ANESTHESIA ATTENDED CARE: Performed by: UROLOGY

## 2019-04-08 RX ORDER — SODIUM CHLORIDE 9 MG/ML
INJECTION, SOLUTION INTRAVENOUS CONTINUOUS
Status: DISCONTINUED | OUTPATIENT
Start: 2019-04-08 | End: 2019-04-08 | Stop reason: HOSPADM

## 2019-04-08 RX ORDER — PROPOFOL 10 MG/ML
INJECTION, EMULSION INTRAVENOUS CONTINUOUS PRN
Status: DISCONTINUED | OUTPATIENT
Start: 2019-04-08 | End: 2019-04-08 | Stop reason: SDUPTHER

## 2019-04-08 RX ORDER — PREDNISONE 20 MG/1
20 TABLET ORAL DAILY
COMMUNITY

## 2019-04-08 RX ORDER — FENTANYL CITRATE 50 UG/ML
INJECTION, SOLUTION INTRAMUSCULAR; INTRAVENOUS PRN
Status: DISCONTINUED | OUTPATIENT
Start: 2019-04-08 | End: 2019-04-08 | Stop reason: SDUPTHER

## 2019-04-08 RX ORDER — SODIUM CHLORIDE 0.9 % (FLUSH) 0.9 %
10 SYRINGE (ML) INJECTION EVERY 12 HOURS SCHEDULED
Status: DISCONTINUED | OUTPATIENT
Start: 2019-04-08 | End: 2019-04-08 | Stop reason: HOSPADM

## 2019-04-08 RX ORDER — LABETALOL HYDROCHLORIDE 5 MG/ML
5 INJECTION, SOLUTION INTRAVENOUS EVERY 10 MIN PRN
Status: DISCONTINUED | OUTPATIENT
Start: 2019-04-08 | End: 2019-04-08 | Stop reason: HOSPADM

## 2019-04-08 RX ORDER — MIDAZOLAM HYDROCHLORIDE 1 MG/ML
INJECTION INTRAMUSCULAR; INTRAVENOUS PRN
Status: DISCONTINUED | OUTPATIENT
Start: 2019-04-08 | End: 2019-04-08 | Stop reason: SDUPTHER

## 2019-04-08 RX ORDER — FENTANYL CITRATE 50 UG/ML
25 INJECTION, SOLUTION INTRAMUSCULAR; INTRAVENOUS EVERY 5 MIN PRN
Status: DISCONTINUED | OUTPATIENT
Start: 2019-04-08 | End: 2019-04-08 | Stop reason: HOSPADM

## 2019-04-08 RX ORDER — PROMETHAZINE HYDROCHLORIDE 25 MG/ML
6.25 INJECTION, SOLUTION INTRAMUSCULAR; INTRAVENOUS
Status: DISCONTINUED | OUTPATIENT
Start: 2019-04-08 | End: 2019-04-08 | Stop reason: HOSPADM

## 2019-04-08 RX ORDER — LIDOCAINE HYDROCHLORIDE 20 MG/ML
INJECTION, SOLUTION EPIDURAL; INFILTRATION; INTRACAUDAL; PERINEURAL PRN
Status: DISCONTINUED | OUTPATIENT
Start: 2019-04-08 | End: 2019-04-08 | Stop reason: SDUPTHER

## 2019-04-08 RX ORDER — MAGNESIUM HYDROXIDE 1200 MG/15ML
LIQUID ORAL
Status: COMPLETED | OUTPATIENT
Start: 2019-04-08 | End: 2019-04-08

## 2019-04-08 RX ORDER — SODIUM CHLORIDE 0.9 % (FLUSH) 0.9 %
10 SYRINGE (ML) INJECTION PRN
Status: DISCONTINUED | OUTPATIENT
Start: 2019-04-08 | End: 2019-04-08 | Stop reason: HOSPADM

## 2019-04-08 RX ADMIN — FENTANYL CITRATE 50 MCG: 50 INJECTION INTRAMUSCULAR; INTRAVENOUS at 15:27

## 2019-04-08 RX ADMIN — MIDAZOLAM 2 MG: 1 INJECTION INTRAMUSCULAR; INTRAVENOUS at 15:16

## 2019-04-08 RX ADMIN — SODIUM CHLORIDE: 9 INJECTION, SOLUTION INTRAVENOUS at 14:45

## 2019-04-08 RX ADMIN — CEFTRIAXONE 2 G: 2 INJECTION, POWDER, FOR SOLUTION INTRAMUSCULAR; INTRAVENOUS at 15:16

## 2019-04-08 RX ADMIN — FENTANYL CITRATE 50 MCG: 50 INJECTION INTRAMUSCULAR; INTRAVENOUS at 15:22

## 2019-04-08 RX ADMIN — IOPAMIDOL 75 ML: 755 INJECTION, SOLUTION INTRAVENOUS at 13:45

## 2019-04-08 RX ADMIN — PROPOFOL 200 MCG/KG/MIN: 10 INJECTION, EMULSION INTRAVENOUS at 15:22

## 2019-04-08 RX ADMIN — LIDOCAINE HYDROCHLORIDE 60 MG: 20 INJECTION, SOLUTION EPIDURAL; INFILTRATION; INTRACAUDAL; PERINEURAL at 15:22

## 2019-04-08 ASSESSMENT — PAIN DESCRIPTION - LOCATION
LOCATION: HEAD
LOCATION: HEAD

## 2019-04-08 ASSESSMENT — PULMONARY FUNCTION TESTS
PIF_VALUE: 0

## 2019-04-08 ASSESSMENT — PAIN SCALES - GENERAL
PAINLEVEL_OUTOF10: 0
PAINLEVEL_OUTOF10: 1
PAINLEVEL_OUTOF10: 0
PAINLEVEL_OUTOF10: 0
PAINLEVEL_OUTOF10: 2

## 2019-04-08 ASSESSMENT — PAIN DESCRIPTION - DESCRIPTORS
DESCRIPTORS: ACHING

## 2019-04-08 ASSESSMENT — PAIN - FUNCTIONAL ASSESSMENT
PAIN_FUNCTIONAL_ASSESSMENT: ACTIVITIES ARE NOT PREVENTED
PAIN_FUNCTIONAL_ASSESSMENT: 0-10
PAIN_FUNCTIONAL_ASSESSMENT: ACTIVITIES ARE NOT PREVENTED

## 2019-04-08 ASSESSMENT — PAIN DESCRIPTION - ONSET
ONSET: ON-GOING
ONSET: ON-GOING

## 2019-04-08 ASSESSMENT — PAIN DESCRIPTION - PAIN TYPE
TYPE: CHRONIC PAIN
TYPE: CHRONIC PAIN

## 2019-04-08 ASSESSMENT — PAIN DESCRIPTION - ORIENTATION
ORIENTATION: ANTERIOR
ORIENTATION: ANTERIOR

## 2019-04-08 ASSESSMENT — PAIN DESCRIPTION - PROGRESSION
CLINICAL_PROGRESSION: NOT CHANGED
CLINICAL_PROGRESSION: GRADUALLY IMPROVING

## 2019-04-08 ASSESSMENT — PAIN DESCRIPTION - FREQUENCY
FREQUENCY: CONTINUOUS
FREQUENCY: INTERMITTENT

## 2019-04-08 NOTE — BRIEF OP NOTE
Brief Postoperative Note  ______________________________________________________________    Patient: Adriano Bain  YOB: 1970  MRN: [id number]  Date of Procedure: 4/8/2019    Pre-Op Diagnosis: GROSS HEMATURIA    Post-Op Diagnosis: Same       Procedure(s):  CYSTOSCOPY    Anesthesia: Monitor Anesthesia Care    Surgeon(s):  Tod Rendon MD    Assistant: none    Estimated Blood Loss (mL): less than 50     Complications: None    Specimens:   * No specimens in log *    Implants:  * No implants in log *      Drains: none    Findings: normal    Sanjeev Roger MD  Date: 4/8/2019  Time: 3:35 PM

## 2019-04-08 NOTE — PROGRESS NOTES
Arrived in pre op for Cystoscopy; came from C-T scan; Saline lock placed right antecubital in C-T; flushes easily and infusion of saline started.

## 2019-04-08 NOTE — ANESTHESIA PRE PROCEDURE
[facility] Department of Anesthesiology  Pre-Anesthesia Evaluation/Consultation       Name:  Carlos Green  : 1970  Age:  53 y.o. MRN:  [id number]  Date: 2019           Surgeon: Surgeon(s):  Pat Brady MD    Procedure: Procedure(s):  CYSTOSCOPY     No Known Allergies  Patient Active Problem List   Diagnosis    Depression    Sarcoidosis    Bell's palsy    Sarcoidosis    Depression    Hypertension    Gout    Chronic pain disorder    Mood swings    Diabetes mellitus, type II ([facility] [address]    Long term (current) use of systemic steroids    Hemorrhagic cystitis    Constipation    Nausea     Past Medical History:   Diagnosis Date    Arthritis     Asthma     Bell's palsy 12    Cerebral artery occlusion with cerebral infarction ([facility] [address]     over 12years; states no physical residual, but poor memory    Chronic pain disorder 10/29/10    Depression 11    Gout 02/15/2011    toe    Hx of blood clots     Hyperlipidemia     Hypertension 401.9    Long term (current) use of systemic steroids 2016    Mood swings 3/24/10    Sarcoidosis 3/7/10     Past Surgical History:   Procedure Laterality Date    ENDOSCOPY, COLON, DIAGNOSTIC       Social History     Tobacco Use    Smoking status: Never Smoker    Smokeless tobacco: Never Used   Substance Use Topics    Alcohol use: Not Currently     Alcohol/week: 0.0 oz    Drug use: Yes     Types: Marijuana     Comment: marijuana for pain and depression--pt states he smoked  this morning- 4/3/19     Medications  No current facility-administered medications on file prior to encounter.       Current Outpatient Medications on File Prior to Encounter   Medication Sig Dispense Refill    predniSONE (DELTASONE) 20 MG tablet Take 20 mg by mouth daily      allopurinol (ZYLOPRIM) 100 MG tablet Take 1 tablet by mouth daily 30 tablet 3    omeprazole (PRILOSEC) 20 MG delayed release capsule Take 1 capsule by mouth 03/10/2019     03/10/2019     CMP    Lab Results   Component Value Date     03/10/2019    K 3.6 03/10/2019     03/10/2019    CO2 24 03/10/2019    BUN 6 03/10/2019    CREATININE 0.7 03/10/2019    GFRAA >60 03/10/2019    GFRAA >60 05/30/2013    AGRATIO 1.2 03/10/2019    LABGLOM >60 03/10/2019    GLUCOSE 114 03/10/2019    PROT 7.4 03/10/2019    PROT 7.0 11/02/2012    CALCIUM 9.2 03/10/2019    BILITOT 0.4 03/10/2019    ALKPHOS 78 03/10/2019    AST 11 03/10/2019    ALT 10 03/10/2019     BMP    Lab Results   Component Value Date     03/10/2019    K 3.6 03/10/2019     03/10/2019    CO2 24 03/10/2019    BUN 6 03/10/2019    CREATININE 0.7 03/10/2019    CALCIUM 9.2 03/10/2019    GFRAA >60 03/10/2019    GFRAA >60 05/30/2013    LABGLOM >60 03/10/2019    GLUCOSE 114 03/10/2019     POCGlucose  No results for input(s): GLUCOSE in the last 72 hours.    Coags  No results found for: PROTIME, INR, APTT  HCG (If Applicable) No results found for: PREGTESTUR, PREGSERUM, HCG, HCGQUANT   ABGs No results found for: PHART, PO2ART, AAI4HGT, GTI9HOI, BEART, D8OJOXHK   Type & Screen (If Applicable)  No results found for: LABABO, LABRH                         BMI: Wt Readings from Last 3 Encounters:       NPO Status:8 hours                          Anesthesia Evaluation  Patient summary reviewed no history of anesthetic complications:   Airway: Mallampati: III  TM distance: >3 FB   Neck ROM: full   Dental:          Pulmonary:normal exam    (+) asthma:                           ROS comment: Sarcoidosis on prednisone     Cardiovascular:  Exercise tolerance: poor (<4 METS),   (+) hypertension:,       ECG reviewed  Rhythm: regular  Rate: normal           Beta Blocker:  Not on Beta Blocker         Neuro/Psych:   (+) CVA (no residual effects per pt):, neuromuscular disease (Kenvil palsy hx):, psychiatric history:            GI/Hepatic/Renal:   (+) GERD:,           Endo/Other:    (+) DiabetesType II DM, , . Abdominal:           Vascular:   + DVT, . Anesthesia Plan      MAC     ASA 3       Induction: intravenous. MIPS: Postoperative opioids intended and Prophylactic antiemetics administered. Anesthetic plan and risks discussed with patient. Plan discussed with CRNA. This pre-anesthesia assessment may be used as a history and physical.    DOS STAFF ADDENDUM:    Pt seen and examined, chart reviewed (including anesthesia, drug and allergy history). No interval changes to history and physical examination. Anesthetic plan, risks, benefits, alternatives, and personnel involved discussed with patient. Patient verbalized an understanding and agrees to proceed.       Corina Hale MD  April 8, 2019  2:38 PM

## 2019-04-08 NOTE — H&P
Preoperative H&P Update    The patient's History and Physical in the medical record  was reviewed by me today. I reviewed the HPI, medications, allergies, reason for surgery, diagnosis and treatment plan and there has been no change.     Electronically signed by Uzma Menjivar MD on 4/8/2019 at 3:15 PM

## 2019-04-08 NOTE — PROGRESS NOTES
From PACU. Alert and oriented. Vss. C/o tolerable frontal headache which he has had, he says because he is hungry. Patient given po fluids and a sandwich.

## 2019-04-08 NOTE — PROGRESS NOTES
Alert and oriented. Says headache to tolerable at 1/10 now after eating. Vss. Has voided x2. Says he has mild, expected burning with urination, but no blood in the urine. watching tv. Ready for discharge, waiting for ride. Verbalizes understanding of discharge instructions.

## 2019-04-09 NOTE — OP NOTE
[address]                                OPERATIVE REPORT    PATIENT NAME: Nadiya Owens                   :        1970  MED REC NO:   [id number]                          ROOM:  ACCOUNT NO:   [de-identified]                           ADMIT DATE: 2019  PROVIDER:     Zulema Serrano MD      DATE OF PROCEDURE:  2019    PREOPERATIVE DIAGNOSIS:  Gross hematuria. POSTOPERATIVE DIAGNOSIS:  Gross hematuria. OPERATION PERFORMED:  Cystoscopy. SURGEON:  Zulema Serrano MD    ANESTHESIA:  MAC. FINDINGS:  Normal cystoscopy, slightly vascular prostate is most likely  source of gross hematuria. Normal CT urogram preoperatively. DRAINS:  None. SPECIMEN:  None. EBL:  Minimal.    COMPLICATIONS:  None immediate. DISPOSITION:  Stable to recovery. Discharged to home. Follow up as  needed. INDICATIONS:  The patient is a 79-year-old male who presented to the  hospital about one month ago with gross hematuria and presumed urinary  tract infection, although, his urine culture came back negative. His  urine was quite bloody and he was actually started on CBI and left in  the hospital for a couple of days on this. He was then able to void on  his own, but hematuria workup was instituted as his culture was  negative. A CT urogram was done earlier today and is essentially normal  with no source of gross hematuria noted. He had some very small and  nonobstructing bilateral renal calculi and some cysts, which were simple  cysts within the bilateral kidneys but not a source of bleeding. He  presents now for cystoscopy. OPERATIVE PROCEDURE:  The patient was properly identified in the  preoperative area and taken to the operating room and placed on the  table in supine position. MAC anesthesia was induced and the patient  was placed in the dorsal lithotomy position.   He was prepped and draped  in standard fashion. Antibiotics were given and a time-out was  performed. Rigid cystoscope was placed through the urethra and into the bladder. The bladder was surveyed in the usual fashion. Anterior urethra was  normal.  Prostatic urethra showed minimal obstruction, although, some  vascularity of the prostate with bleeding on insertion of the scope but  this was just very mild. Bilateral ureteral orifices were identified in  the expected position. The rest of the bladder again appeared slightly  vascular but with minimal trabeculation, otherwise, no tumors, stones,  trabeculation or inflammation. His bladder was drained, the scope was removed, and the procedure was  ended. The patient tolerated the procedure well and was taken to  recovery room in good condition. He will follow up as needed.         Avinash Quinteros MD    D: 04/08/2019 17:01:52       T: 04/08/2019 21:49:10     [email or web address]  Job#: [id number]     Doc#: [id number]    CC:

## 2019-04-16 ENCOUNTER — CARE COORDINATION (OUTPATIENT)
Dept: CASE MANAGEMENT | Age: 49
End: 2019-04-16

## 2019-04-16 NOTE — CARE COORDINATION
[facility] Transitions Follow Up Call    2019    Patient: Janine Mosquera  Patient : 1970   MRN: [id number]  Reason for Admission:   Discharge Date: 19 RARS: Readmission Risk Score: 8         Spoke with: Sebas Denton 24 Transitions Subsequent and Final Call    Subsequent and Final Calls  Do you have any ongoing symptoms?:  No  Have your medications changed?:  No  Do you have any questions related to your medications?:  No  Do you currently have any active services?:  No  Do you have any needs or concerns that I can assist you with?:  No  Care Transitions Interventions  Other Interventions:        States he's doing well. Denies hematuria, pain or burning with urination or difficulty urinating. Reports taking all medications as prescribed. Pt states he has an appt with PCP Dr. Ambar Cheng tomorrow and plans to attend. Denies any questions or concerns at this time. Follow Up  No future appointments.     Lala Naik

## 2019-04-27 ENCOUNTER — CARE COORDINATION (OUTPATIENT)
Dept: CARE COORDINATION | Age: 49
End: 2019-04-27

## 2019-04-27 NOTE — CARE COORDINATION
[facility] Transitions Follow Up Call    2019    Patient: Luciana Moctezuma  Patient : 1970   MRN: [id number]  Reason for Admission:   Discharge Date: 19 RARS: Readmission Risk Score: 8         Spoke with: Patient. Patient states he is doing well. No UTI problems. No C/O urinary burning, pain, frequency, fever, odor, color yellow. Patient has no needs or concerns for me at this time. Will Follow up at later time. Care Transitions Subsequent and Final Call    Subsequent and Final Calls  Do you have any ongoing symptoms?:  No  Have your medications changed?:  No  Do you have any questions related to your medications?:  No  Do you currently have any active services?:  No  Do you have any needs or concerns that I can assist you with?:  No  Identified Barriers:  None  Care Transitions Interventions  Other Interventions: Follow Up  No future appointments.     Loretta Amador LPN

## 2019-05-07 ENCOUNTER — CARE COORDINATION (OUTPATIENT)
Dept: CASE MANAGEMENT | Age: 49
End: 2019-05-07

## 2019-05-07 NOTE — CARE COORDINATION
[facility] Transitions Follow Up Call    2019    Patient: Carlitos Child  Patient : 1970   MRN: [id number]  Reason for Admission:   Discharge Date: 19 RARS: Readmission Risk Score: 8         Spoke with: Kelsey [address] Transitions Subsequent and Final Call    Subsequent and Final Calls  Do you have any ongoing symptoms?:  No  Have your medications changed?:  No  Do you have any questions related to your medications?:  No  Do you currently have any active services?:  No  Do you have any needs or concerns that I can assist you with?:  No  Identified Barriers:  None  Care Transitions Interventions  Other Interventions: Follow Up : Spoke briefly with patient who had been sleeping when I called. Pt said he has no difficulty urinating, no blood in his urine, and is doing well. Pt said he went for his follow up appt after his cystoscopy on  and it went well. Pt said he has no needs or questions for me today. Will continue to follow patient status. No future appointments.     Jose Munroe RN

## 2019-05-21 ENCOUNTER — CARE COORDINATION (OUTPATIENT)
Dept: CASE MANAGEMENT | Age: 49
End: 2019-05-21

## 2019-05-21 NOTE — CARE COORDINATION
[facility] Transitions Follow Up Call    2019    Patient: Kaylene Blizzard  Patient : 1970   MRN: [id number]  Reason for Admission: UTI  Discharge Date: 19 RARS: Readmission Risk Score: 8         Spoke with: PATRICIA JUNIOR Transitions Subsequent and Final Call    Subsequent and Final Calls  Do you have any ongoing symptoms?:  Yes  Patient-reported symptoms:  Other  Have your medications changed?:  No  Do you have any questions related to your medications?:  No  Do you currently have any active services?:  No  Do you have any needs or concerns that I can assist you with?:  No  Care Transitions Interventions  Other Interventions:        Pt states he's doing well. Reports he has pain in his lower abdomen every once in a while during urination. Denies any blood in urine, difficulty urinating, fevers or other symptoms. States he followed up with his doctor following his cystoscopy, and is due to return in 2 months. Denies questions or concerns at this time. Reports taking medications as prescribed. Follow Up  No future appointments.     Jocelyn Pemberton

## 2019-06-13 ENCOUNTER — CARE COORDINATION (OUTPATIENT)
Dept: CASE MANAGEMENT | Age: 49
End: 2019-06-13

## 2019-06-13 NOTE — CARE COORDINATION
[facility] Transitions Follow Up Call    2019    Patient: Erin Al  Patient : 1970   MRN: [id number]  Reason for Admission:  Discharge Date: 19 RARS: Readmission Risk Score: 8         Spoke with: Sebas Denton 24 Transitions Subsequent and Final Call    Subsequent and Final Calls  Care Transitions Interventions  Other Interventions:        States he's at work right now so can't talk, but is doing fine and denies questions or concerns at this time. Follow Up  No future appointments.     Marcie Ohms

## 2019-12-20 ENCOUNTER — HOSPITAL ENCOUNTER (EMERGENCY)
Age: 49
Discharge: HOME | End: 2019-12-20
Attending: EMERGENCY MEDICINE
Payer: MEDICARE

## 2019-12-20 VITALS
HEIGHT: 72 IN | SYSTOLIC BLOOD PRESSURE: 149 MMHG | HEART RATE: 73 BPM | BODY MASS INDEX: 26.16 KG/M2 | WEIGHT: 193.12 LBS | RESPIRATION RATE: 18 BRPM | DIASTOLIC BLOOD PRESSURE: 93 MMHG | OXYGEN SATURATION: 99 % | TEMPERATURE: 98 F

## 2019-12-20 DIAGNOSIS — N30.01 ACUTE CYSTITIS WITH HEMATURIA: Primary | ICD-10-CM

## 2019-12-20 LAB
A/G RATIO: 1.3 (ref 1.1–2.2)
ALBUMIN SERPL-MCNC: 4.1 G/DL (ref 3.4–5)
ALP BLD-CCNC: 67 U/L (ref 40–129)
ALT SERPL-CCNC: 17 U/L (ref 10–40)
ANION GAP SERPL CALCULATED.3IONS-SCNC: 13 MMOL/L (ref 3–16)
AST SERPL-CCNC: 16 U/L (ref 15–37)
BASOPHILS ABSOLUTE: 0.1 K/UL (ref 0–0.2)
BASOPHILS RELATIVE PERCENT: 0.7 %
BILIRUB SERPL-MCNC: <0.2 MG/DL (ref 0–1)
BILIRUBIN URINE: ABNORMAL
BLOOD, URINE: ABNORMAL
BUN BLDV-MCNC: 22 MG/DL (ref 7–20)
CALCIUM SERPL-MCNC: 9.5 MG/DL (ref 8.3–10.6)
CHLORIDE BLD-SCNC: 103 MMOL/L (ref 99–110)
CLARITY: ABNORMAL
CO2: 26 MMOL/L (ref 21–32)
COLOR: ABNORMAL
CREAT SERPL-MCNC: 1.1 MG/DL (ref 0.9–1.3)
EOSINOPHILS ABSOLUTE: 0.1 K/UL (ref 0–0.6)
EOSINOPHILS RELATIVE PERCENT: 1.6 %
GFR AFRICAN AMERICAN: >60
GFR NON-AFRICAN AMERICAN: >60
GLOBULIN: 3.2 G/DL
GLUCOSE BLD-MCNC: 109 MG/DL (ref 70–99)
GLUCOSE URINE: NEGATIVE MG/DL
HCT VFR BLD CALC: 38.7 % (ref 40.5–52.5)
HEMOGLOBIN: 12.9 G/DL (ref 13.5–17.5)
KETONES, URINE: ABNORMAL MG/DL
LEUKOCYTE ESTERASE, URINE: ABNORMAL
LYMPHOCYTES ABSOLUTE: 2.9 K/UL (ref 1–5.1)
LYMPHOCYTES RELATIVE PERCENT: 37.6 %
MCH RBC QN AUTO: 30.7 PG (ref 26–34)
MCHC RBC AUTO-ENTMCNC: 33.4 G/DL (ref 31–36)
MCV RBC AUTO: 91.8 FL (ref 80–100)
MICROSCOPIC EXAMINATION: YES
MONOCYTES ABSOLUTE: 0.5 K/UL (ref 0–1.3)
MONOCYTES RELATIVE PERCENT: 6.3 %
NEUTROPHILS ABSOLUTE: 4.1 K/UL (ref 1.7–7.7)
NEUTROPHILS RELATIVE PERCENT: 53.8 %
NITRITE, URINE: POSITIVE
PDW BLD-RTO: 14.8 % (ref 12.4–15.4)
PH UA: 7 (ref 5–8)
PLATELET # BLD: 264 K/UL (ref 135–450)
PMV BLD AUTO: 7.1 FL (ref 5–10.5)
POTASSIUM REFLEX MAGNESIUM: 3.8 MMOL/L (ref 3.5–5.1)
PROTEIN UA: >=300 MG/DL
RBC # BLD: 4.22 M/UL (ref 4.2–5.9)
RBC UA: >100 /HPF (ref 0–2)
SODIUM BLD-SCNC: 142 MMOL/L (ref 136–145)
SPECIFIC GRAVITY UA: 1.02 (ref 1–1.03)
TOTAL PROTEIN: 7.3 G/DL (ref 6.4–8.2)
URINE REFLEX TO CULTURE: YES
URINE TYPE: ABNORMAL
UROBILINOGEN, URINE: 2 E.U./DL
WBC # BLD: 7.6 K/UL (ref 4–11)
WBC UA: ABNORMAL /HPF (ref 0–5)

## 2019-12-20 PROCEDURE — 51798 US URINE CAPACITY MEASURE: CPT

## 2019-12-20 PROCEDURE — 85025 COMPLETE CBC W/AUTO DIFF WBC: CPT

## 2019-12-20 PROCEDURE — 87086 URINE CULTURE/COLONY COUNT: CPT

## 2019-12-20 PROCEDURE — 51702 INSERT TEMP BLADDER CATH: CPT

## 2019-12-20 PROCEDURE — 81001 URINALYSIS AUTO W/SCOPE: CPT

## 2019-12-20 PROCEDURE — 6370000000 HC RX 637 (ALT 250 FOR IP): Performed by: EMERGENCY MEDICINE

## 2019-12-20 PROCEDURE — 99283 EMERGENCY DEPT VISIT LOW MDM: CPT

## 2019-12-20 PROCEDURE — 80053 COMPREHEN METABOLIC PANEL: CPT

## 2019-12-20 RX ORDER — CEPHALEXIN 500 MG/1
500 CAPSULE ORAL ONCE
Status: COMPLETED | OUTPATIENT
Start: 2019-12-20 | End: 2019-12-20

## 2019-12-20 RX ORDER — CEPHALEXIN 500 MG/1
500 CAPSULE ORAL 2 TIMES DAILY
Qty: 20 CAPSULE | Refills: 0 | Status: SHIPPED | OUTPATIENT
Start: 2019-12-20 | End: 2019-12-30

## 2019-12-20 RX ADMIN — CEPHALEXIN 500 MG: 500 CAPSULE ORAL at 23:35

## 2019-12-20 ASSESSMENT — PAIN SCALES - GENERAL
PAINLEVEL_OUTOF10: 4
PAINLEVEL_OUTOF10: 0

## 2019-12-20 ASSESSMENT — PAIN DESCRIPTION - PAIN TYPE: TYPE: ACUTE PAIN

## 2019-12-20 ASSESSMENT — PAIN DESCRIPTION - ORIENTATION: ORIENTATION: LOWER

## 2019-12-20 ASSESSMENT — PAIN DESCRIPTION - DESCRIPTORS: DESCRIPTORS: DISCOMFORT

## 2019-12-20 ASSESSMENT — PAIN DESCRIPTION - LOCATION: LOCATION: ABDOMEN

## 2019-12-21 ASSESSMENT — ENCOUNTER SYMPTOMS
VOMITING: 0
ABDOMINAL PAIN: 0
NAUSEA: 0

## 2019-12-22 ENCOUNTER — HOSPITAL ENCOUNTER (EMERGENCY)
Age: 49
Discharge: HOME | End: 2019-12-22
Payer: MEDICARE

## 2019-12-22 VITALS
SYSTOLIC BLOOD PRESSURE: 146 MMHG | HEIGHT: 72 IN | BODY MASS INDEX: 26.14 KG/M2 | RESPIRATION RATE: 20 BRPM | TEMPERATURE: 98.1 F | OXYGEN SATURATION: 100 % | WEIGHT: 193 LBS | DIASTOLIC BLOOD PRESSURE: 103 MMHG | HEART RATE: 101 BPM

## 2019-12-22 DIAGNOSIS — T83.9XXD FOLEY CATHETER PROBLEM, SUBSEQUENT ENCOUNTER: Primary | ICD-10-CM

## 2019-12-22 LAB — URINE CULTURE, ROUTINE: NORMAL

## 2019-12-22 PROCEDURE — 6370000000 HC RX 637 (ALT 250 FOR IP): Performed by: NURSE PRACTITIONER

## 2019-12-22 PROCEDURE — 99283 EMERGENCY DEPT VISIT LOW MDM: CPT

## 2019-12-22 PROCEDURE — 51702 INSERT TEMP BLADDER CATH: CPT

## 2019-12-22 RX ORDER — LIDOCAINE HYDROCHLORIDE 20 MG/ML
JELLY TOPICAL PRN
Status: DISCONTINUED | OUTPATIENT
Start: 2019-12-22 | End: 2019-12-22 | Stop reason: HOSPADM

## 2019-12-22 RX ADMIN — LIDOCAINE HYDROCHLORIDE: 20 JELLY TOPICAL at 12:34

## 2019-12-22 ASSESSMENT — ENCOUNTER SYMPTOMS
VOMITING: 0
RESPIRATORY NEGATIVE: 1
ABDOMINAL PAIN: 1
CONSTIPATION: 1
NAUSEA: 0

## 2020-02-06 ENCOUNTER — HOSPITAL ENCOUNTER (EMERGENCY)
Age: 50
Discharge: HOME | End: 2020-02-06
Payer: MEDICARE

## 2020-02-06 ENCOUNTER — APPOINTMENT (OUTPATIENT)
Dept: GENERAL RADIOLOGY | Age: 50
End: 2020-02-06
Payer: MEDICARE

## 2020-02-06 VITALS
OXYGEN SATURATION: 98 % | HEART RATE: 75 BPM | TEMPERATURE: 98.1 F | WEIGHT: 199.3 LBS | RESPIRATION RATE: 16 BRPM | BODY MASS INDEX: 26.99 KG/M2 | SYSTOLIC BLOOD PRESSURE: 150 MMHG | DIASTOLIC BLOOD PRESSURE: 102 MMHG | HEIGHT: 72 IN

## 2020-02-06 PROCEDURE — 99283 EMERGENCY DEPT VISIT LOW MDM: CPT

## 2020-02-06 PROCEDURE — 73030 X-RAY EXAM OF SHOULDER: CPT

## 2020-02-06 RX ORDER — CYCLOBENZAPRINE HCL 10 MG
10 TABLET ORAL 3 TIMES DAILY PRN
Qty: 21 TABLET | Refills: 0 | Status: SHIPPED | OUTPATIENT
Start: 2020-02-06 | End: 2020-02-13

## 2020-02-06 RX ORDER — IBUPROFEN 800 MG/1
800 TABLET ORAL EVERY 8 HOURS PRN
Qty: 30 TABLET | Refills: 0 | Status: SHIPPED | OUTPATIENT
Start: 2020-02-06 | End: 2022-05-18

## 2020-02-06 RX ORDER — LIDOCAINE 4 G/G
1 PATCH TOPICAL DAILY
Qty: 30 PATCH | Refills: 0 | Status: SHIPPED | OUTPATIENT
Start: 2020-02-06 | End: 2020-03-07

## 2020-02-06 ASSESSMENT — PAIN DESCRIPTION - FREQUENCY: FREQUENCY: CONTINUOUS

## 2020-02-06 ASSESSMENT — PAIN DESCRIPTION - PAIN TYPE: TYPE: ACUTE PAIN

## 2020-02-06 ASSESSMENT — PAIN DESCRIPTION - ORIENTATION: ORIENTATION: LEFT

## 2020-02-06 ASSESSMENT — PAIN SCALES - GENERAL
PAINLEVEL_OUTOF10: 9
PAINLEVEL_OUTOF10: 9

## 2020-02-06 ASSESSMENT — PAIN DESCRIPTION - PROGRESSION: CLINICAL_PROGRESSION: NOT CHANGED

## 2020-02-06 ASSESSMENT — PAIN - FUNCTIONAL ASSESSMENT: PAIN_FUNCTIONAL_ASSESSMENT: ACTIVITIES ARE NOT PREVENTED

## 2020-02-06 ASSESSMENT — PAIN DESCRIPTION - ONSET: ONSET: AWAKENED FROM SLEEP

## 2020-02-06 ASSESSMENT — PAIN DESCRIPTION - DESCRIPTORS: DESCRIPTORS: DULL;DISCOMFORT

## 2020-02-06 ASSESSMENT — PAIN DESCRIPTION - LOCATION: LOCATION: SHOULDER

## 2020-02-07 NOTE — ED NOTES
AVS reviewed with pt who verbalized understanding of f/u care and d/c instructions.  Pt stable for d/c      Matt Peace RN  02/06/20 [id number]

## 2020-02-07 NOTE — ED PROVIDER NOTES
[address] [facility]  [address] [address]  Dept: [phone]  Loc: [address] ENCOUNTER        This patient was not seen or evaluated by the attending physician. I evaluated this patient, the attending physician was available for consultation. CHIEF COMPLAINT    Chief Complaint   Patient presents with    Shoulder Pain     pt presents to ED for L shoulder pain, NKI. x6 days. states pain is like a \"toothache\". took 800mg ibuprofen and tylenol extra strength. no previous injuries or surgeries        HPI    Thomas Houser is a 51 y.o. male who presents with pain to the left shoulder. Onset was 7 days ago. The duration has been constant since the onset. The context is that the symptom started spontaneously without any known injury or trauma. The quality of the pain is sharp. The severity is 9/10. The patient has no other associated injury.      REVIEW OF SYSTEMS    Musculoskeletal: see HPI, no other joint or bony injury  Cardiac: No chest pain  Pulmonary: No difficulty breathing  Skin: No lacerations or puncture wounds  Neurologic: No loss of consciousness, no head injury, no paresthesias or focal distal extremity weakness    PAST MEDICAL & SURGICAL HISTORY    Past Medical History:   Diagnosis Date    Arthritis     Asthma     Bell's palsy 9/5/12    Cerebral artery occlusion with cerebral infarction ([facility] [address]     over 12years; states no physical residual, but poor memory    Chronic pain disorder 10/29/10    Depression 7/13/11    Gout 02/15/2011    toe    Hx of blood clots     Hyperlipidemia     Hypertension 401.9    Long term (current) use of systemic steroids 01/20/2016    Mood swings 3/24/10    Sarcoidosis 3/7/10     Past Surgical History:   Procedure Laterality Date    CYSTOSCOPY N/A 4/8/2019    CYSTOSCOPY performed by Shayne Ramirez MD at [address] [place] [address], COLON, DIAGNOSTIC

## 2020-02-12 ENCOUNTER — OFFICE VISIT (OUTPATIENT)
Dept: ORTHOPEDIC SURGERY | Age: 50
End: 2020-02-12
Payer: MEDICARE

## 2020-02-12 VITALS
HEART RATE: 91 BPM | HEIGHT: 72 IN | RESPIRATION RATE: 17 BRPM | WEIGHT: 198 LBS | SYSTOLIC BLOOD PRESSURE: 124 MMHG | BODY MASS INDEX: 26.82 KG/M2 | DIASTOLIC BLOOD PRESSURE: 83 MMHG

## 2020-02-12 PROCEDURE — G8419 CALC BMI OUT NRM PARAM NOF/U: HCPCS | Performed by: ORTHOPAEDIC SURGERY

## 2020-02-12 PROCEDURE — G8427 DOCREV CUR MEDS BY ELIG CLIN: HCPCS | Performed by: ORTHOPAEDIC SURGERY

## 2020-02-12 PROCEDURE — 20610 DRAIN/INJ JOINT/BURSA W/O US: CPT | Performed by: ORTHOPAEDIC SURGERY

## 2020-02-12 PROCEDURE — G8484 FLU IMMUNIZE NO ADMIN: HCPCS | Performed by: ORTHOPAEDIC SURGERY

## 2020-02-12 PROCEDURE — 99213 OFFICE O/P EST LOW 20 MIN: CPT | Performed by: ORTHOPAEDIC SURGERY

## 2020-02-12 RX ORDER — METHYLPREDNISOLONE ACETATE 40 MG/ML
80 INJECTION, SUSPENSION INTRA-ARTICULAR; INTRALESIONAL; INTRAMUSCULAR; SOFT TISSUE ONCE
Status: COMPLETED | OUTPATIENT
Start: 2020-02-12 | End: 2020-02-12

## 2020-02-12 RX ORDER — BUPIVACAINE HYDROCHLORIDE 2.5 MG/ML
3 INJECTION, SOLUTION INFILTRATION; PERINEURAL ONCE
Status: COMPLETED | OUTPATIENT
Start: 2020-02-12 | End: 2020-02-12

## 2020-02-12 RX ADMIN — BUPIVACAINE HYDROCHLORIDE 7.5 MG: 2.5 INJECTION, SOLUTION INFILTRATION; PERINEURAL at 09:18

## 2020-02-12 RX ADMIN — METHYLPREDNISOLONE ACETATE 80 MG: 40 INJECTION, SUSPENSION INTRA-ARTICULAR; INTRALESIONAL; INTRAMUSCULAR; SOFT TISSUE at 09:18

## 2020-02-12 NOTE — PROGRESS NOTES
Mona Montalvo  [id number]  February 12, 2020    Chief Complaint   Patient presents with    Pain     left shoulder pain           History: The patient is a 69-year-old gentleman who is here for evaluation of his left shoulder. He denies any specific injury. He is right-hand dominant. The patient reportedly has had pain in the left shoulder for the past 2 to 3 weeks. He is on long-term prednisone for sarcoidosis. He has difficulty with overhead activities. He has pain when sleeping on the left side. He denies any numbness or tingling. He did present to the emergency room and was recently given Flexeril. He has had no other treatment for the shoulder. This is a consult from Victor Manuel Anderson CNP for left shoulder pain. The patient's  past medical history, medications, allergies,  family history, social history, and review of systems have been reviewed, and dated and are recorded in the chart. Pertinent items are noted in HPI. Review of systems reviewed from Pertinent History Form dated on 2/12/20 and available in the patient's chart under the Media tab. /83   Pulse 91   Resp 17   Ht 6' 0.01\" (1.829 m)   Wt 198 lb (89.8 kg)   BMI 26.85 kg/m²     Physical: The patient presents today in no acute distress. He is alert and oriented to person, place and time. He displays appropriate mood and affect. He is well dressed, nourished and  groomed. He has a normal affect. Examination of the neck reveals no evidence of tenderness. Spurling's sign is negative. Active range of motion of the left shoulder is: 175 degrees abduction, 175 degrees forward flexion, 45 degrees of external rotation and internal rotation to L1. Passive range of motion of the left shoulder is: 180 degrees abduction, 180 degrees forward flexion, 50 degrees of external rotation and internal rotation to T11. Active and passive range of motion of the opposite shoulder is full.  Examination of the left shoulder reveals positive

## 2020-02-19 ENCOUNTER — HOSPITAL ENCOUNTER (OUTPATIENT)
Dept: PHYSICAL THERAPY | Age: 50
Setting detail: THERAPIES SERIES
Discharge: HOME | End: 2020-02-19
Payer: MEDICARE

## 2020-02-21 ENCOUNTER — HOSPITAL ENCOUNTER (OUTPATIENT)
Dept: PHYSICAL THERAPY | Age: 50
Setting detail: THERAPIES SERIES
Discharge: HOME | End: 2020-02-21
Payer: MEDICARE

## 2020-02-21 PROCEDURE — 97140 MANUAL THERAPY 1/> REGIONS: CPT

## 2020-02-21 PROCEDURE — 97035 APP MDLTY 1+ULTRASOUND EA 15: CPT

## 2020-02-21 PROCEDURE — 97162 PT EVAL MOD COMPLEX 30 MIN: CPT

## 2020-02-21 PROCEDURE — 97012 MECHANICAL TRACTION THERAPY: CPT

## 2020-02-21 ASSESSMENT — PAIN DESCRIPTION - LOCATION: LOCATION: ARM;NECK

## 2020-02-21 ASSESSMENT — PAIN DESCRIPTION - DIRECTION: RADIATING_TOWARDS: LEFT ELBOW

## 2020-02-21 ASSESSMENT — PAIN DESCRIPTION - PROGRESSION: CLINICAL_PROGRESSION: NOT CHANGED

## 2020-02-21 ASSESSMENT — PAIN DESCRIPTION - PAIN TYPE: TYPE: ACUTE PAIN

## 2020-02-21 ASSESSMENT — PAIN - FUNCTIONAL ASSESSMENT: PAIN_FUNCTIONAL_ASSESSMENT: ACTIVITIES ARE NOT PREVENTED

## 2020-02-21 ASSESSMENT — PAIN DESCRIPTION - FREQUENCY: FREQUENCY: INTERMITTENT

## 2020-02-21 ASSESSMENT — PAIN DESCRIPTION - ONSET: ONSET: SUDDEN

## 2020-02-21 ASSESSMENT — PAIN DESCRIPTION - ORIENTATION: ORIENTATION: LEFT

## 2020-02-21 ASSESSMENT — PAIN DESCRIPTION - DESCRIPTORS: DESCRIPTORS: THROBBING

## 2020-02-21 ASSESSMENT — PAIN SCALES - GENERAL: PAINLEVEL_OUTOF10: 10
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
## 2020-02-21 NOTE — PLAN OF CARE
have any questions or concerns, please don't hesitate to call.   Thank you for your referral.      Physician Signature:________________________________Date:__________________  By signing above, therapists plan is approved by physician
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
## 2020-02-26 ENCOUNTER — HOSPITAL ENCOUNTER (OUTPATIENT)
Dept: PHYSICAL THERAPY | Age: 50
Setting detail: THERAPIES SERIES
Discharge: HOME | End: 2020-02-26
Payer: MEDICARE

## 2020-02-26 PROCEDURE — G0283 ELEC STIM OTHER THAN WOUND: HCPCS

## 2020-02-26 PROCEDURE — 97110 THERAPEUTIC EXERCISES: CPT

## 2020-02-26 PROCEDURE — 97012 MECHANICAL TRACTION THERAPY: CPT

## 2020-02-26 NOTE — FLOWSHEET NOTE
swelling/inflammation/restriction, improving soft tissue extensibility and allowing for proper ROM for normal function with self- care, mobility, lifting and ambulation. Timed Code Treatment Minutes: 10   Total Treatment Minutes: 40     [] EVAL (LOW) 56374   [] EVAL (MOD) 09080   [] EVAL (HIGH) 59228   [] RE-EVAL   [x] TE (00544) x  1     [] NMR (05576)   x    [] Manual (47078) x 15   [] Ultrasound (78000) x10  TA (07793) x  [x] Mech Traction (85143)  [] Ionto (64662)           [x] ES (un) (67845):   [] Other:      Treatment/Activity Tolerance:  [x] Patient tolerated treatment well [] Patient limited by fatigue  [] Patient limited by pain  [] Patient limited by other medical complications  [x] Other: 2/26/20: Felt better after session today, pain decreased from 10/10 to 5/10. 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 Add exercise.      Electronically signed by:  Toya Coyle, PT,

## 2020-02-28 ENCOUNTER — HOSPITAL ENCOUNTER (OUTPATIENT)
Dept: PHYSICAL THERAPY | Age: 50
Setting detail: THERAPIES SERIES
Discharge: HOME | End: 2020-02-28
Payer: MEDICARE

## 2020-02-28 PROCEDURE — 97140 MANUAL THERAPY 1/> REGIONS: CPT

## 2020-02-28 PROCEDURE — 97035 APP MDLTY 1+ULTRASOUND EA 15: CPT

## 2020-02-28 PROCEDURE — 97110 THERAPEUTIC EXERCISES: CPT

## 2020-02-28 PROCEDURE — 97012 MECHANICAL TRACTION THERAPY: CPT
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
## 2020-03-03 ENCOUNTER — OFFICE VISIT (OUTPATIENT)
Dept: ORTHOPEDIC SURGERY | Age: 50
End: 2020-03-03
Payer: MEDICAID

## 2020-03-03 VITALS
WEIGHT: 198 LBS | HEART RATE: 80 BPM | TEMPERATURE: 98.4 F | BODY MASS INDEX: 26.82 KG/M2 | SYSTOLIC BLOOD PRESSURE: 137 MMHG | HEIGHT: 72 IN | DIASTOLIC BLOOD PRESSURE: 86 MMHG

## 2020-03-03 PROBLEM — M50.30 DDD (DEGENERATIVE DISC DISEASE), CERVICAL: Status: ACTIVE | Noted: 2020-03-03

## 2020-03-03 PROBLEM — M54.12 CERVICAL RADICULAR PAIN: Status: ACTIVE | Noted: 2020-03-03

## 2020-03-03 PROCEDURE — G8427 DOCREV CUR MEDS BY ELIG CLIN: HCPCS | Performed by: PHYSICIAN ASSISTANT

## 2020-03-03 PROCEDURE — G8484 FLU IMMUNIZE NO ADMIN: HCPCS | Performed by: PHYSICIAN ASSISTANT

## 2020-03-03 PROCEDURE — 1036F TOBACCO NON-USER: CPT | Performed by: PHYSICIAN ASSISTANT

## 2020-03-03 PROCEDURE — 99214 OFFICE O/P EST MOD 30 MIN: CPT | Performed by: PHYSICIAN ASSISTANT

## 2020-03-03 PROCEDURE — G8419 CALC BMI OUT NRM PARAM NOF/U: HCPCS | Performed by: PHYSICIAN ASSISTANT

## 2020-03-04 ENCOUNTER — HOSPITAL ENCOUNTER (OUTPATIENT)
Dept: PHYSICAL THERAPY | Age: 50
Setting detail: THERAPIES SERIES
Discharge: HOME | End: 2020-03-04
Payer: MEDICAID

## 2020-03-04 ENCOUNTER — TELEPHONE (OUTPATIENT)
Dept: ORTHOPEDIC SURGERY | Age: 50
End: 2020-03-04

## 2020-03-04 PROBLEM — M54.2 NECK PAIN: Status: ACTIVE | Noted: 2020-03-03

## 2020-03-04 PROCEDURE — 97012 MECHANICAL TRACTION THERAPY: CPT

## 2020-03-04 PROCEDURE — 97140 MANUAL THERAPY 1/> REGIONS: CPT

## 2020-03-04 PROCEDURE — 97110 THERAPEUTIC EXERCISES: CPT

## 2020-03-04 NOTE — PROGRESS NOTES
 Diabetes Mother     Diabetes Father     Cancer Father        Past Surgical History:   Procedure Laterality Date    CYSTOSCOPY N/A 4/8/2019    CYSTOSCOPY performed by Mahin Sutton MD at [facility] ENDOSCOPY, COLON, DIAGNOSTIC         Social History     Occupational History    Occupation: Dietary at [employer]    Tobacco Use    Smoking status: Never Smoker    Smokeless tobacco: Never Used   Substance and Sexual Activity    Alcohol use: Not Currently     Alcohol/week: 0.0 standard drinks    Drug use: Yes     Types: Marijuana     Comment: marijuana for pain and depression--pt states he smoked  this morning- 4/3/19    Sexual activity: Yes     Partners: Female       Current Outpatient Medications   Medication Sig Dispense Refill    lidocaine 4 % external patch Place 1 patch onto the skin daily 30 patch 0    ibuprofen (ADVIL;MOTRIN) 800 MG tablet Take 1 tablet by mouth every 8 hours as needed for Pain 30 tablet 0    predniSONE (DELTASONE) 20 MG tablet Take 20 mg by mouth daily      Vilazodone HCl 10 & 20 & 40 MG KIT Take 1 each by mouth daily 1 kit 0    allopurinol (ZYLOPRIM) 100 MG tablet Take 1 tablet by mouth daily 30 tablet 3    montelukast (SINGULAIR) 10 MG tablet Take 1 tablet by mouth nightly 30 tablet 5    omeprazole (PRILOSEC) 20 MG delayed release capsule Take 1 capsule by mouth daily 30 capsule 3    amLODIPine (NORVASC) 10 MG tablet Take 1 tablet by mouth daily 30 tablet 5     No current facility-administered medications for this visit. Objective:     He is alert, oriented x 3, pleasant, well nourished, developed and in no acute distress. /86   Pulse 80   Temp 98.4 °F (36.9 °C)   Ht 6' (1.829 m)   Wt 198 lb (89.8 kg)   BMI 26.85 kg/m²      Cervical Spine Exam:  There is not deformity. There is  loss of motion. There is  muscular spasm. There is  trapezius/ rhomboid tenderness. There is not spinous process tenderness.   There is not cervical Ambulatory referral to Physical Therapy        Plan:     Discussed the cervical pain, its course and treatment. Discussed appropriate exercises. Discussed appropriate use of ice and heat. OTC analgesics as needed. PT referral.    If no improvement in 2 weeks of physical therapy addressing cervical spine then consider MRI. Follow Up: 2 weeks. Call or return to clinic prn if these symptoms worsen or fail to improve as anticipated.
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
## 2020-03-04 NOTE — TELEPHONE ENCOUNTER
Pt calling checking the status of a Rx (steroid) was going to be prescribed. Please advise.      Call [phone]      [facility] [phone]

## 2020-03-06 ENCOUNTER — HOSPITAL ENCOUNTER (OUTPATIENT)
Dept: PHYSICAL THERAPY | Age: 50
Setting detail: THERAPIES SERIES
Discharge: HOME | End: 2020-03-06
Payer: MEDICAID

## 2020-03-06 PROCEDURE — 97140 MANUAL THERAPY 1/> REGIONS: CPT

## 2020-03-06 PROCEDURE — 97110 THERAPEUTIC EXERCISES: CPT

## 2020-03-06 PROCEDURE — 97012 MECHANICAL TRACTION THERAPY: CPT

## 2020-03-06 NOTE — FLOWSHEET NOTE
endurance, ROM for functional self-care, mobility, lifting and ambulation   [] (50101) Reviewed/Progressed HEP activities related to improving balance, coordination, kinesthetic sense, posture, motor skill, proprioception for self-care, mobility, lifting, and ambulation      Manual Treatments:  MFR / STM / Oscillations-Mobs:  G-I, II, III, IV / Manipulation / MLD  [x] (63171) Provided manual therapy to mobilize  soft tissue/joints/fluid for the purpose of modulating pain, promoting relaxation, increasing ROM, reducing/eliminating soft tissue swelling/inflammation/restriction, improving soft tissue extensibility and allowing for proper ROM for normal function with self- care, mobility, lifting and ambulation. Timed Code Treatment Minutes: 45   Total Treatment Minutes: 65     [] EVAL (LOW) 54883   [] EVAL (MOD) 38530   [] EVAL (HIGH) 24429   [] RE-EVAL   [x] TE (35544) x  1     [] NMR (00095)   x    [x] Manual (69433) x 2   [] Ultrasound (20022) x10  TA (33826) x      [x] Mech Traction (60715)  [] Ionto (44863)           [x] ES (un) (60965):   [] Other:      Treatment/Activity Tolerance:  [x] Patient tolerated treatment well [] Patient limited by fatigue  [] Patient limited by pain  [] Patient limited by other medical complications  [x] Other: 2/26/20: Felt better after session today, pain decreased     Prognosis: [x] Good [] Fair  [] Poor    Goals:    Short term goals  Time Frame for Short term goals: 4 weeks  Short term goal 1: Pt will note less neck and left shoulder pain to 1-2/10 so he can sleep better  Short term goal 2: Pt will present with full range of left shoulder and cervical spinal ROM  Short term goal 3: 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 Add exercise, 3/4/20 went over posture again and resting positions.   Got   An order for cervical , concentrate on cervical and upper thoracic    Electronically signed by:  Jorge Gray PT,

## 2020-03-11 ENCOUNTER — HOSPITAL ENCOUNTER (OUTPATIENT)
Dept: PHYSICAL THERAPY | Age: 50
Setting detail: THERAPIES SERIES
Discharge: HOME | End: 2020-03-11
Payer: MEDICAID

## 2020-03-11 PROCEDURE — 97140 MANUAL THERAPY 1/> REGIONS: CPT

## 2020-03-11 PROCEDURE — 97110 THERAPEUTIC EXERCISES: CPT

## 2020-03-11 PROCEDURE — 97012 MECHANICAL TRACTION THERAPY: CPT
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
## 2020-03-13 ENCOUNTER — HOSPITAL ENCOUNTER (OUTPATIENT)
Dept: PHYSICAL THERAPY | Age: 50
Setting detail: THERAPIES SERIES
Discharge: HOME | End: 2020-03-13
Payer: MEDICAID

## 2020-03-13 NOTE — FLOWSHEET NOTE
Physical Therapy  Cancellation/No-show Note  Patient Name:  Jesus Ferrer  :  1970   Date:  3/13/2020  Cancelled visits to date: 1  No-shows to date: 0    For today's appointment patient:  [x]  Cancelled  []  Rescheduled appointment  []  No-show     Reason given by patient:  [x]  Patient ill  []  Conflicting appointment  []  No transportation    []  Conflict with work  []  No reason given  [x]  Other:     Comments: Ill with his lung disease  (sarcoidosis). PT called him and reminded him of his next appt on . Said he felt well for about an hour after last treatment and has no neck or shoulder pain right now, as of the time of his phone call.   Electronically signed by:  Solomon Frankel PT

## 2020-03-18 ENCOUNTER — HOSPITAL ENCOUNTER (OUTPATIENT)
Dept: PHYSICAL THERAPY | Age: 50
Setting detail: THERAPIES SERIES
Discharge: HOME | End: 2020-03-18
Payer: MEDICAID

## 2020-03-20 ENCOUNTER — HOSPITAL ENCOUNTER (OUTPATIENT)
Dept: PHYSICAL THERAPY | Age: 50
Setting detail: THERAPIES SERIES
Discharge: HOME | End: 2020-03-20
Payer: MEDICAID

## 2020-03-20 PROCEDURE — 97110 THERAPEUTIC EXERCISES: CPT

## 2020-03-20 PROCEDURE — 97012 MECHANICAL TRACTION THERAPY: CPT

## 2020-03-20 PROCEDURE — 97140 MANUAL THERAPY 1/> REGIONS: CPT
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
## 2020-03-20 NOTE — PROGRESS NOTES
pain now, but felt great for about one hour after first therapy session. Pain is in left upper extremity. 2/28/20: Clay Morin reports no pain at this time, until he started doing the arm bike and then pain was on left upper trap at 6/10.  3/4/20  Pt states, \" getting relief for an hour and then back to being painful, I went to another md who gave me a script for my neck \"  3/6/20  Pt states, \" felt better after last rx , not as far down my arm and not as often \"  3/11/20: Pt states that he feels better overall, but has pain in left arm now. Pain is in left arm to left elbow. 3/20/20: Pt reports no pain at this time. Says pain is less frequent and less intense. Taking vicodin and usually is painfree throughout the day.     Objective: See eval  · Observation:   · Test measurements:  Rotation of cervical spine is improved. Left shoulder ROM is WNL. Less paresthesia in left UE.           Assessment:   Summary: Pt has completed 7 of 8 visits with benefit . He has less intense pain and less frequent pain. Progression Towards Functional goals:  [x] Patient is progressing as expected towards functional goals listed. [] Progression is slowed due to complexities listed. [] Progression has been slowed due to co-morbidities. 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

## 2020-03-25 ENCOUNTER — APPOINTMENT (OUTPATIENT)
Dept: PHYSICAL THERAPY | Age: 50
End: 2020-03-25
Payer: MEDICAID

## 2020-03-27 ENCOUNTER — APPOINTMENT (OUTPATIENT)
Dept: PHYSICAL THERAPY | Age: 50
End: 2020-03-27
Payer: MEDICAID

## 2020-07-13 ENCOUNTER — HOSPITAL ENCOUNTER (OUTPATIENT)
Dept: PHYSICAL THERAPY | Age: 50
Setting detail: THERAPIES SERIES
Discharge: HOME | End: 2020-07-13
Payer: MEDICAID

## 2020-07-13 PROCEDURE — 97162 PT EVAL MOD COMPLEX 30 MIN: CPT

## 2020-07-13 PROCEDURE — 97140 MANUAL THERAPY 1/> REGIONS: CPT

## 2020-07-13 PROCEDURE — 97012 MECHANICAL TRACTION THERAPY: CPT

## 2020-07-13 ASSESSMENT — PAIN DESCRIPTION - FREQUENCY: FREQUENCY: CONTINUOUS

## 2020-07-13 ASSESSMENT — PAIN - FUNCTIONAL ASSESSMENT: PAIN_FUNCTIONAL_ASSESSMENT: ACTIVITIES ARE NOT PREVENTED

## 2020-07-13 ASSESSMENT — PAIN DESCRIPTION - DESCRIPTORS: DESCRIPTORS: ACHING

## 2020-07-13 ASSESSMENT — PAIN SCALES - GENERAL: PAINLEVEL_OUTOF10: 10

## 2020-07-13 ASSESSMENT — PAIN DESCRIPTION - DIRECTION: RADIATING_TOWARDS: LEFT UPPER ARM

## 2020-07-13 ASSESSMENT — PAIN DESCRIPTION - ORIENTATION: ORIENTATION: LEFT

## 2020-07-13 ASSESSMENT — PAIN DESCRIPTION - PROGRESSION: CLINICAL_PROGRESSION: GRADUALLY WORSENING

## 2020-07-13 ASSESSMENT — PAIN DESCRIPTION - LOCATION: LOCATION: ARM;NECK

## 2020-07-13 ASSESSMENT — PAIN DESCRIPTION - ONSET: ONSET: SUDDEN

## 2020-07-13 NOTE — FLOWSHEET NOTE
Physical Therapy Daily Treatment Note  Date:  2020    Patient Name:  Nathalia Barbosa    :  1970  MRN: [id number]    Restrictions/Precautions: Restrictions/Precautions  Restrictions/Precautions: Fall Risk(Min risk of falls due to balance issues)    Pertinent Medical History: Additional Pertinent Hx: PLOF: Independent    Medical/Treatment Diagnosis Information:  · Diagnosis: Cervical radiculopathy, DDD c spine  · Treatment Diagnosis: Poor posture (slouched), decreased ROM of cervical spine and left UE, diminished sensation on left C5-6 dermatome, pain on neck and left shoulder/upper arm    Insurance/Certification information:  PT Insurance Information: Al[employer]  Physician Information:  Referring Practitioner: DONNA Soto  Plan of care signed (Y/N):  Sent to [facility]    Visit# / total visits:  -  Pain level:  -10/10     G-Code (if applicable):      Date / Visit # G-Code Applied:  /       Progress Note: []  Yes  [x]  No  Next due by: Visit #10      History of Injury: See below    Subjective:  Subjective  Subjective: Pain subsided one week after last therapy session ended in March before COVID closure of this PT clinic. . It has been well since then, until 3 weeks ago when pain returned to neck and left upper arm  without incident or injury. No paresthesias. Objective: See eval   Observation:    Test measurements:        Exercises:  Exercise/Equipment Resistance/Repetitions Other comments   Upper trap stretch 5 sec  x5    Lev scap stretch  Add   Ext rot by clasping hands behind neck and pressing elbows back as tolerated. Add                                                             Other Therapeutic Activities:  Patient was educated on diagnosis, plan of care and prognosis of their complaint. Also, frequency and duration of treatments was discussed. Patient was informed of the attendance policy and issued a copy for their records.    Reviewed posture and importance of good posture. Home Exercise Program: Has from last PT session,  will review next time and add more. Manual Treatments:    Manual traction, STM to upper trap, lev scap, trapezius, and posterior scalenes while seated. Thoracic mobs . Postural correction and review. Modalities:    Int c traction with MHP, 15# x 12 min. Charges: Therapeutic Exercise:  [x] (70772) Provided verbal/tactile cueing for activities to restore or maintain strength, flexibility, endurance, ROM for improvements with self-care, mobility, lifting and ambulation. Neuromuscular Re-Education  [] (48208) Provided verbal/tactile cueing for activities to restore or maintain balance, coordination, kinesthetic sense, posture, motor skill, proprioception for self-care, mobility, lifting, and ambulation. Therapeutic Activities:    [] (79281) Provided verbal/tactile cueing to address functional limitations related to loss of mobility, strength, balance, and coordination.      Gait Training:  [] (87546) Provided training and instruction to the patient for proper postural muscle recruitment and positioning with ambulation re-education     Home Exercise Program:    [x] (66682) Reviewed/Progressed HEP activities related to strengthening, flexibility, endurance, ROM for functional self-care, mobility, lifting and ambulation   [] (82453) Reviewed/Progressed HEP activities related to improving balance, coordination, kinesthetic sense, posture, motor skill, proprioception for self-care, mobility, lifting, and ambulation      Manual Treatments:  MFR / STM / Oscillations-Mobs:  G-I, II, III, IV / Manipulation / MLD  [x] (27427) Provided manual therapy to mobilize  soft tissue/joints/fluid for the purpose of modulating pain, promoting relaxation, increasing ROM, reducing/eliminating soft tissue swelling/inflammation/restriction, improving soft tissue extensibility and allowing for proper ROM for normal function with self- care, mobility, lifting and

## 2020-07-13 NOTE — PROGRESS NOTES
Physical Therapy  Initial Assessment  Date: 2020  Patient Name: Roberto Aceves MRN: [id number]  : 1970     Treatment Diagnosis: Poor posture (slouched), decreased ROM of cervical spine and left UE, diminished sensation on left C5-6 dermatome, pain on neck and left shoulder/upper arm    Restrictions  Restrictions/Precautions  Restrictions/Precautions: Fall Risk(Min risk of falls due to balance issues)    Subjective   General  Chart Reviewed: Yes  Patient assessed for rehabilitation services?: Yes  Additional Pertinent Hx: PLOF: Independent  Family / Caregiver Present: No  Referring Practitioner: DONNA Bolivar  Referral Date : 20  Diagnosis: Cervical radiculopathy, DDDD c spine  PT Visit Information  Onset Date: 20  PT Insurance Information: [employer]el Emeka  Total # of Visits Approved: 12  Total # of Visits to Date: 1  Subjective  Subjective: Paint subsided one week after last therapy session ended. It has been well since then, until 3 weeks ago when pain returned to neck and left upper arm  without incident or injury. No paresthesias. Pain Screening  Patient Currently in Pain: Yes  Pain Assessment  Pain Assessment: 0-10  Pain Level: 10  Patient's Stated Pain Goal: No pain  Pain Location: Arm;Neck  Pain Orientation: Left  Pain Radiating Towards: Left upper arm  Pain Descriptors: Aching  Pain Frequency: Continuous  Pain Onset: Sudden  Clinical Progression: Gradually worsening  Functional Pain Assessment: Activities are not prevented  Vital Signs  Patient Currently in Pain: Yes           Objective     Observation/Palpation  Posture: Fair(Slouching in waiting area and during part of therapy)  Palpation: Tenderness on upper trap, posterior scalenes, and interscapular muscles.     AROM RUE (degrees)  RUE AROM : WFL  R Shoulder Flexion 0-180: 0-160  R Shoulder ABduction 0-180: 0-160  R Shoulder Int Rotation  0-70: 0-45  R Shoulder Ext Rotation 0-90: 0-70  R Elbow Flexion 0-145: WNL  R Elbow Extension 145-0: WNL  AROM LUE (degrees)  LUE AROM : Exceptions  L Shoulder Flexion 0-180: 0-160  L Shoulder ABduction 0-180: 0-160  L Shoulder Int Rotation  0-70: 0-45  L Shoulder Ext Rotation  0-90: 0-70  L Elbow Flexion 0-145: WNL  L Elbow Extension 145-0: WNL  Spine  Cervical: FLEX:  0-21, ext: 0-50   SB L: 0-32   SB R: 0-40  ROT : L : 0-55  Rot  R:  0-60    Strength RUE  Strength RUE: WNL  Strength LUE  Strength LUE: WNL        Sensation  Overall Sensation Status: Impaired  Light Touch: Partial deficits in the LUE           Assessment   Conditions Requiring Skilled Therapeutic Intervention  Body structures, Functions, Activity limitations: Decreased functional mobility ; Decreased ROM; Decreased posture; Increased pain  Assessment: PLOF: Independent  Treatment Diagnosis: Poor posture (slouched), decreased ROM of cervical spine and left UE, diminished sensation on left C5-6 dermatome, pain on neck and left shoulder/upper arm  Prognosis: Good  Decision Making: Medium Complexity  REQUIRES PT FOLLOW UP: Yes         Plan   Plan  Times per week: 2  Plan weeks: 4-6  Current Treatment Recommendations: Strengthening, ROM, Manual Therapy - Joint Manipulation, Modalities, Home Exercise Program    G-Code       OutComes Score                 QuickDASH Total Score: 21 (07/13/20 0903)       QuickDASH Disability/Symptom Score : 22.73 % (07/13/20 0903)                        AM-PAC Score             Goals  Short term goals  Time Frame for Short term goals: 4 weeks  Short term goal 1: Pt will be aware of proper posture to decrease stress on c spine. Short term goal 2: Pt will increase cervical spinal ROM to WNL  Short term goal 3: Pt will be able to increase Left shoulder and cervical mobility to do ADL's with greater ease  Patient Goals   Patient goals :  \"Feel better\"       Therapy Time   Individual Concurrent Group Co-treatment   Time In 4539         Time Out 0935         Minutes 45         Timed Code Treatment Minutes: 12 Minutes       Priya Wilson, PT

## 2020-07-13 NOTE — PLAN OF CARE
Outpatient Physical Therapy  [] [facility]    Phone: [phone]   Fax: [phone]   [x] [facility]  Phone: [phone]              Fax: [phone]  [] [facility]   Phone: [phone]   Fax: [phone]     To: Referring Practitioner: DONNA Tracy      Patient: Kirti Thakkar Sr.   : 1970   MRN: [id number]  Evaluation Date: 2020      Diagnosis Information:  · Diagnosis: Cervical radiculopathy, DDDD c spine   · Treatment Diagnosis: Poor posture (slouched), decreased ROM of cervical spine and left UE, diminished sensation on left C5-6 dermatome, pain on neck and left shoulder/upper arm     Physical Therapy Certification/Re-Certification Form  Dear DONNA Tracy,  The following patient has been evaluated for physical therapy services and for therapy to continue, Medicare requires monthly physician review of the treatment plan. Please review the attached evaluation and/or summary of the patient's plan of care, and verify that you agree therapy should continue by signing the attached document and sending it back to our office. Plan of Care/Treatment to date:  [x] Therapeutic Exercise    [x] Modalities:  [x] Therapeutic Activity     [] Ultrasound  [] Electrical Stimulation  [] Gait Training      [x] Cervical Traction [] Lumbar Traction  [] Neuromuscular Re-education    [] Cold/hotpack [] Iontophoresis   [x] Instruction in HEP     Other:  [x] Manual Therapy      []             [] Aquatic Therapy      []           ? Frequency/Duration:  # Days per week: [] 1 day # Weeks: [] 1 week [] 5 weeks     [x] 2 days? [] 2 weeks [x] 6 weeks     [] 3 days   [] 3 weeks [] 7 weeks     [] 4 days   [x] 4 weeks [] 8 weeks    Rehab Potential: [] Excellent [x] Good [] Fair  [] Poor       Electronically signed by:  Kassy Muñiz PT      If you have any questions or concerns, please don't hesitate to call.   Thank you for your referral.      Physician Signature:________________________________Date:__________________  By signing above, therapists plan is approved by physician

## 2020-07-13 NOTE — PROGRESS NOTES
Physical Therapy  Initial Assessment  Date: 2020  Patient Name: Jyoti Mitchell MRN: [id number]  : 1970     Treatment Diagnosis: Poor posture (slouched), decreased ROM of cervical spine and left UE, diminished sensation on left C5-6 dermatome, pain on neck and left shoulder/upper arm    Restrictions  Restrictions/Precautions  Restrictions/Precautions: Fall Risk(Min risk of falls due to balance issues)    Subjective   General  Chart Reviewed: Yes  Patient assessed for rehabilitation services?: Yes  Additional Pertinent Hx: PLOF: Independent  Family / Caregiver Present: No  Referring Practitioner: DONNA Navarrete  Referral Date : 20  Diagnosis: Cervical radiculopathy, DDDD c spine  PT Visit Information  Onset Date: 20  PT Insurance Information: Lindsey Dennis  Total # of Visits Approved: 12  Total # of Visits to Date: 1  Subjective  Subjective: Paint subsided one week after last therapy session ended. It has been well since then, until 3 weeks ago when pain returned to neck and left upper arm  without incident or injury. No paresthesias. Pain Screening  Patient Currently in Pain: Yes  Pain Assessment  Pain Assessment: 0-10  Pain Level: 10  Patient's Stated Pain Goal: No pain  Pain Location: Arm;Neck  Pain Orientation: Left  Pain Radiating Towards: Left upper arm  Pain Descriptors: Aching  Pain Frequency: Continuous  Pain Onset: Sudden  Clinical Progression: Gradually worsening  Functional Pain Assessment: Activities are not prevented  Vital Signs  Patient Currently in Pain: Yes         Objective     Observation/Palpation  Posture: Fair(Slouching in waiting area and during part of therapy)  Palpation: Tenderness on upper trap, posterior scalenes, and interscapular muscles.     AROM RUE (degrees)  RUE AROM : WFL  R Shoulder Flexion 0-180: 0-160  R Shoulder ABduction 0-180: 0-160  R Shoulder Int Rotation  0-70: 0-45  R Shoulder Ext Rotation 0-90: 0-70  R Elbow Flexion 0-145: WNL  R Elbow Extension 145-0: WNL  AROM LUE (degrees)  LUE AROM : Exceptions  L Shoulder Flexion 0-180: 0-160  L Shoulder ABduction 0-180: 0-160  L Shoulder Int Rotation  0-70: 0-45  L Shoulder Ext Rotation  0-90: 0-70  L Elbow Flexion 0-145: WNL  L Elbow Extension 145-0: WNL  Spine  Cervical: FLEX:  0-21, ext: 0-50   SB L: 0-32   SB R: 0-40  ROT : L : 0-55  Rot  R:  0-60    Strength RUE  Strength RUE: WNL  Strength LUE  Strength LUE: WNL        Sensation  Overall Sensation Status: Impaired  Light Touch: Partial deficits in the LUE                                          Assessment   Conditions Requiring Skilled Therapeutic Intervention  Body structures, Functions, Activity limitations: Decreased functional mobility ; Decreased ROM; Decreased posture; Increased pain  Assessment: PLOF: Independent  Treatment Diagnosis: Poor posture (slouched), decreased ROM of cervical spine and left UE, diminished sensation on left C5-6 dermatome, pain on neck and left shoulder/upper arm  Prognosis: Good  Decision Making: Medium Complexity  REQUIRES PT FOLLOW UP: Yes         Plan   Plan  Times per week: 2  Plan weeks: 4-6  Current Treatment Recommendations: Strengthening, ROM, Manual Therapy - Joint Manipulation, Modalities, Home Exercise Program    G-Code       OutComes Score                 QuickDASH Total Score: 21 (07/13/20 0903)       QuickDASH Disability/Symptom Score : 22.73 % (07/13/20 0903)                        AM-PAC Score             Goals  Short term goals  Time Frame for Short term goals: 4 weeks  Short term goal 1: Pt will be aware of proper posture to decrease stress on c spine. Short term goal 2: Pt will increase cervical spinal ROM to WNL  Short term goal 3: Pt will be able to increase Left shoulder and cervical mobility to do ADL's with greater ease  Patient Goals   Patient goals :  \"Feel better\"       Therapy Time   Individual Concurrent Group Co-treatment   Time In 0850         Time Out 0935         Minutes 45         Timed Code Treatment Minutes: [address] [facility],

## 2020-07-16 ENCOUNTER — HOSPITAL ENCOUNTER (OUTPATIENT)
Dept: PHYSICAL THERAPY | Age: 50
Setting detail: THERAPIES SERIES
Discharge: HOME | End: 2020-07-16
Payer: MEDICAID

## 2020-07-16 PROCEDURE — 97012 MECHANICAL TRACTION THERAPY: CPT

## 2020-07-16 PROCEDURE — 97140 MANUAL THERAPY 1/> REGIONS: CPT

## 2020-07-16 NOTE — FLOWSHEET NOTE
Physical Therapy Daily Treatment Note  Date:  2020    Patient Name:  Claude Isidro    :  1970  MRN: [id number]    Restrictions/Precautions:      Pertinent Medical History:      Medical/Treatment Diagnosis Information:  · Diagnosis: Cervical radiculopathy, DDD c spine  · Treatment Diagnosis: Poor posture (slouched), decreased ROM of cervical spine and left UE, diminished sensation on left C5-6 dermatome, pain on neck and left shoulder/upper arm    Insurance/Certification information:  PT Insurance Information: Alessio  Physician Information:  Referring Practitioner: DONNA Hidalgo  Plan of care signed (Y/N):  Sent to Pravin Darshan 20    Visit# / total visits: -  Pain level:  8-10/10     G-Code (if applicable):      Date / Visit # G-Code Applied:  /       Progress Note: []  Yes  [x]  No  Next due by: Visit #10      History of Injury: See below    Subjective:  Subjective  Subjective: Pain subsided one week after last therapy session ended in March before COVID closure of this PT clinic. . It has been well since then, until 3 weeks ago when pain returned to neck and left upper arm  without incident or injury. No paresthesias. Objective: See eval   Observation:    Test measurements:        Exercises:  Exercise/Equipment Resistance/Repetitions Other comments   Upper trap stretch 5 sec  x5    Lev scap stretch  Add   Ext rot by clasping hands behind neck and pressing elbows back as tolerated. Add   Prone scap add X 30    Prone ue raises X 20    Left levator stretch 30 x 3                                               Other Therapeutic Activities:  Patient was educated on diagnosis, plan of care and prognosis of their complaint. Also, frequency and duration of treatments was discussed. Patient was informed of the attendance policy and issued a copy for their records. Reviewed posture and importance of good posture.     Home Exercise Program: Has from last PT session,  will review next time and add more. Manual Treatments:    Manual traction, STM to upper trap, lev scap, trapezius, and posterior scalenes while seated. Thoracic mobs . Mfr to teres and infra on the left, left traps counterstrain, , T1 left in flex x 30 min    Modalities:    Int c traction with MHP, 17/2  60/20 # x 12 min. Charges: Therapeutic Exercise:  [x] (07382) Provided verbal/tactile cueing for activities to restore or maintain strength, flexibility, endurance, ROM for improvements with self-care, mobility, lifting and ambulation. Neuromuscular Re-Education  [] (11414) Provided verbal/tactile cueing for activities to restore or maintain balance, coordination, kinesthetic sense, posture, motor skill, proprioception for self-care, mobility, lifting, and ambulation. Therapeutic Activities:    [] (09499) Provided verbal/tactile cueing to address functional limitations related to loss of mobility, strength, balance, and coordination. Gait Training:  [] (65016) Provided training and instruction to the patient for proper postural muscle recruitment and positioning with ambulation re-education     Home Exercise Program:    [x] (60232) Reviewed/Progressed HEP activities related to strengthening, flexibility, endurance, ROM for functional self-care, mobility, lifting and ambulation   [] (27170) Reviewed/Progressed HEP activities related to improving balance, coordination, kinesthetic sense, posture, motor skill, proprioception for self-care, mobility, lifting, and ambulation      Manual Treatments:  MFR / STM / Oscillations-Mobs:  G-I, II, III, IV / Manipulation / MLD  [x] (99705) Provided manual therapy to mobilize  soft tissue/joints/fluid for the purpose of modulating pain, promoting relaxation, increasing ROM, reducing/eliminating soft tissue swelling/inflammation/restriction, improving soft tissue extensibility and allowing for proper ROM for normal function with self- care, mobility, lifting and ambulation. Timed Code Treatment Minutes: 30   Total Treatment Minutes: 45     [] EVAL (LOW) 97074   [] EVAL (MOD) 99717   [] EVAL (HIGH) 39406   [] RE-EVAL   [] TE (98104) x       [] NMR (02978)   x    [x] Manual (07778) x2  [] Ultrasound (86928) x  [] TA (04828) x  [x] Mech Traction (78018)  [] Ionto (70104)           [] ES (un) (20703):   [] Other:      Treatment/Activity Tolerance:  [x] Patient tolerated treatment well [] Patient limited by fatigue  [] Patient limited by pain  [] Patient limited by other medical complications  [] Other:     Prognosis: [x] Good [] Fair  [] Poor    Goals:    Short term goals  Time Frame for Short term goals: 4 weeks  Short term goal 1: Pt will be aware of proper posture to decrease stress on c spine.   Short term goal 2: Pt will increase cervical spinal ROM to WNL  Short term goal 3: Pt will be able to increase Left shoulder and cervical mobility to do ADL's with greater ease              Patient Requires Follow-up: [x] Yes  [] No    Plan:   [] Continue per plan of care [] Alter current plan (see comments)  [x] Plan of care initiated [] Hold pending MD visit [] Discharge    Plan for Next Session: See above, appears to have a C7 irritation with T1 rotation    Electronically signed by:  Pietro Montano, PT,

## 2020-07-20 ENCOUNTER — HOSPITAL ENCOUNTER (OUTPATIENT)
Dept: PHYSICAL THERAPY | Age: 50
Setting detail: THERAPIES SERIES
Discharge: HOME | End: 2020-07-20
Payer: MEDICAID

## 2020-07-20 PROCEDURE — 97110 THERAPEUTIC EXERCISES: CPT

## 2020-07-20 PROCEDURE — 97140 MANUAL THERAPY 1/> REGIONS: CPT

## 2020-07-20 PROCEDURE — 97012 MECHANICAL TRACTION THERAPY: CPT

## 2020-07-20 NOTE — FLOWSHEET NOTE
swelling/inflammation/restriction, improving soft tissue extensibility and allowing for proper ROM for normal function with self- care, mobility, lifting and ambulation. Timed Code Treatment Minutes: 36   Total Treatment Minutes: 51     [] EVAL (LOW) 04480   [] EVAL (MOD) 58222   [] EVAL (HIGH) 49166   [] RE-EVAL   [x] TE (52668) x 1      [] NMR (71772)   x    [x] Manual (48259) x 1 [] Ultrasound (05392) x  [] TA (81240) x  [x] Mech Traction (10371)  [] Ionto (51811)           [] ES (un) (33358):   [] Other:      Treatment/Activity Tolerance:  [x] Patient tolerated treatment well [] Patient limited by fatigue  [] Patient limited by pain  [] Patient limited by other medical complications  [] Other:     Prognosis: [x] Good [] Fair  [] Poor    Goals:    Short term goals  Time Frame for Short term goals: 4 weeks  Short term goal 1: Pt will be aware of proper posture to decrease stress on c spine.   Short term goal 2: Pt will increase cervical spinal ROM to WNL  Short term goal 3: Pt will be able to increase Left shoulder and cervical mobility to do ADL's with greater ease              Patient Requires Follow-up: [x] Yes  [] No    Plan:   [x] Continue per plan of care [] Alter current plan (see comments)  [] Plan of care initiated [] Hold pending MD visit [] Discharge    Plan for Next Session: See above, appears to have a C7 irritation with T1 rotation    Electronically signed by:  Stephanie Garsia, PT,

## 2020-07-23 ENCOUNTER — HOSPITAL ENCOUNTER (OUTPATIENT)
Dept: PHYSICAL THERAPY | Age: 50
Setting detail: THERAPIES SERIES
Discharge: HOME | End: 2020-07-23
Payer: MEDICAID

## 2020-07-23 PROCEDURE — 97140 MANUAL THERAPY 1/> REGIONS: CPT

## 2020-07-23 NOTE — FLOWSHEET NOTE
Physical Therapy Daily Treatment Note  Date:  2020    Patient Name:  Essence Mercado    :  1970  MRN: [id number]    Restrictions/Precautions:      Pertinent Medical History:      Medical/Treatment Diagnosis Information:  · Diagnosis: Cervical radiculopathy, DDD c spine  · Treatment Diagnosis: Poor posture (slouched), decreased ROM of cervical spine and left UE, diminished sensation on left C5-6 dermatome, pain on neck and left shoulder/upper arm    Insurance/Certification information:  PT Insurance Information: [employer]  Physician Information:  Referring Practitioner: DONNA Oropeza  Plan of care signed (Y/N):  Sent to [facility]    Visit# / total visits: -  Pain level:  3-4/10     G-Code (if applicable):      Date / Visit # G-Code Applied:  /       Progress Note: []  Yes  [x]  No  Next due by: Visit #10      History of Injury: See below    Subjective:  Subjective  Subjective: Pain subsided one week after last therapy session ended in March before COVID closure of this PT clinic. . It has been well since then, until 3 weeks ago when pain returned to neck and left upper arm  without incident or injury. No paresthesias. 20: Pt reports pain is less than it was.  20: Pt reports that he has less pain and the left shoulder felt really good. Objective: See eval   Observation:    Test measurements:        Exercises:  Exercise/Equipment Resistance/Repetitions Other comments   5 sec  x5    5 sec x 5 Added to HEP   Ext rot by clasping hands behind neck and pressing elbows back as tolerated. Added 7/23/20   X 7 Added to HEP   X 4 Ceased due to bilateral shoulder pain   30 x 3                                               Other Therapeutic Activities:  Patient was educated on diagnosis, plan of care and prognosis of their complaint. Also, frequency and duration of treatments was discussed. Patient was informed of the attendance policy and issued a copy for their records. Reviewed posture and importance of good posture. Home Exercise Program: Has from last PT session,  will review next time and add more. Pt was given written instructions for new exercises. Manual Treatments:    Manual traction, STM to upper trap, lev scap, trapezius, and posterior scalenes while seated. Thoracic mobs . Mfr to teres and infra on the left, left traps counterstrain, , T1 left in flex x 13 min    Modalities:      Charges: Therapeutic Exercise:  [x] (15976) Provided verbal/tactile cueing for activities to restore or maintain strength, flexibility, endurance, ROM for improvements with self-care, mobility, lifting and ambulation. Neuromuscular Re-Education  [] (63722) Provided verbal/tactile cueing for activities to restore or maintain balance, coordination, kinesthetic sense, posture, motor skill, proprioception for self-care, mobility, lifting, and ambulation. Therapeutic Activities:    [] (42892) Provided verbal/tactile cueing to address functional limitations related to loss of mobility, strength, balance, and coordination.      Gait Training:  [] (50845) Provided training and instruction to the patient for proper postural muscle recruitment and positioning with ambulation re-education     Home Exercise Program:    [x] (10321) Reviewed/Progressed HEP activities related to strengthening, flexibility, endurance, ROM for functional self-care, mobility, lifting and ambulation   [] (43050) Reviewed/Progressed HEP activities related to improving balance, coordination, kinesthetic sense, posture, motor skill, proprioception for self-care, mobility, lifting, and ambulation      Manual Treatments:  MFR / STM / Oscillations-Mobs:  G-I, II, III, IV / Manipulation / MLD  [x] (17394) Provided manual therapy to mobilize  soft tissue/joints/fluid for the purpose of modulating pain, promoting relaxation, increasing ROM, reducing/eliminating soft tissue swelling/inflammation/restriction, improving soft tissue extensibility and allowing for proper ROM for normal function with self- care, mobility, lifting and ambulation. Timed Code Treatment Minutes: 13   Total Treatment Minutes: 14     [] EVAL (LOW) 80728   [] EVAL (MOD) 54130   [] EVAL (HIGH) 69168   [] RE-EVAL   [] TE (04049) x       [] NMR (51422)   x    [x] Manual (17974) x 1  [] Ultrasound (30313) x  [] TA (73390) x  [] Mech Traction (88860)  [] Ionto (51456)           [] ES (un) (35381):   [x] Other:  7/23/20: Pt reports needing to leave early due to needing to be at work for mandatory COVID testing. Treatment/Activity Tolerance:  [x] Patient tolerated treatment well [] Patient limited by fatigue  [] Patient limited by pain  [] Patient limited by other medical complications  [] Other:     Prognosis: [x] Good [] Fair  [] Poor    Goals:    Short term goals  Time Frame for Short term goals: 4 weeks  Short term goal 1: Pt will be aware of proper posture to decrease stress on c spine. Short term goal 2: Pt will increase cervical spinal ROM to WNL  Short term goal 3: Pt will be able to increase Left shoulder and cervical mobility to do ADL's with greater ease              Patient Requires Follow-up: [x] Yes  [] No    Plan:   [x] Continue per plan of care [] Alter current plan (see comments)  [] Plan of care initiated [] Hold pending MD visit [] Discharge    Plan for Next Session: See above, appears to have a C7 irritation with T1 rotation. Resume exercises and traction next time, when patient has more time.     Electronically signed by:  Priya Wilson, PT,

## 2020-07-27 ENCOUNTER — HOSPITAL ENCOUNTER (OUTPATIENT)
Dept: PHYSICAL THERAPY | Age: 50
Setting detail: THERAPIES SERIES
Discharge: HOME | End: 2020-07-27
Payer: MEDICAID

## 2020-07-27 PROCEDURE — 97140 MANUAL THERAPY 1/> REGIONS: CPT

## 2020-07-27 PROCEDURE — 97012 MECHANICAL TRACTION THERAPY: CPT

## 2020-07-27 NOTE — FLOWSHEET NOTE
Physical Therapy Daily Treatment Note  Date:  2020    Patient Name:  Yue Martinez    :  1970  MRN: [id number]    Restrictions/Precautions:      Pertinent Medical History:      Medical/Treatment Diagnosis Information:  · Diagnosis: Cervical radiculopathy, DDD c spine  · Treatment Diagnosis: Poor posture (slouched), decreased ROM of cervical spine and left UE, diminished sensation on left C5-6 dermatome, pain on neck and left shoulder/upper arm    Insurance/Certification information:  PT Insurance Information: [employer]  Physician Information:  Referring Practitioner: DONNA Mckeon  Plan of care signed (Y/N):  Sent to [facility] 20    Visit# / total visits: -  Pain level:  0/10     G-Code (if applicable):      Date / Visit # G-Code Applied:  /       Progress Note: []  Yes  [x]  No  Next due by: Visit #10      History of Injury: See below    Subjective:  Subjective  Subjective: Pain subsided one week after last therapy session ended in March before COVID closure of this PT clinic. . It has been well since then, until 3 weeks ago when pain returned to neck and left upper arm  without incident or injury. No paresthesias. 20: Pt reports pain is less than it was.  20: Pt reports that he has less pain and the left shoulder felt really good. 20: Pt reports that he is painfree at this time and yesterday. Had pain Friday and Saturday though. Objective: See eval  Observation: Good cervical ROM but noted several tight knots on left scapular border and upper trap     Test measurements:      Exercises:  Exercise/Equipment Resistance/Repetitions Other comments   Upper trap stretch5 sec  x5    Lev scap stretch5 sec x 5 Added to HEP   Ext rot by clasping hands behind neck and pressing elbows back as tolerated.  8X Added 20   Prone scap addX 8 Added to HEP   Prone upper body raisesX 10    30 x 3                                               Other Therapeutic Activities: G-I, II, III, IV / Manipulation / MLD  [x] (66064) Provided manual therapy to mobilize  soft tissue/joints/fluid for the purpose of modulating pain, promoting relaxation, increasing ROM, reducing/eliminating soft tissue swelling/inflammation/restriction, improving soft tissue extensibility and allowing for proper ROM for normal function with self- care, mobility, lifting and ambulation. Timed Code Treatment Minutes: 35   Total Treatment Minutes: 50     [] EVAL (LOW) 90760   [] EVAL (MOD) 34133   [] EVAL (HIGH) 97408   [] RE-EVAL   [] TE (11760) x       [] NMR (79134)   x    [x] Manual (80592) x 2  [] Ultrasound (82696) x  [] TA (43056) x  [x] Mech Traction (10617)  [] Ionto (11844)           [] ES (un) (22730):   [x] Other:  Briefly reviewed above exercises and corrected tchnique    Treatment/Activity Tolerance:  [x] Patient tolerated treatment well [] Patient limited by fatigue  [] Patient limited by pain  [] Patient limited by other medical complications  [] Other:     Prognosis: [x] Good [] Fair  [] Poor    Goals:    Short term goals  Time Frame for Short term goals: 4 weeks  Short term goal 1: Pt will be aware of proper posture to decrease stress on c spine. Short term goal 2: Pt will increase cervical spinal ROM to WNL  Short term goal 3: Pt will be able to increase Left shoulder and cervical mobility to do ADL's with greater ease        Patient goals : \"Feel better\"      Patient Requires Follow-up: [x] Yes  [] No    Plan:   [x] Continue per plan of care [] Alter current plan (see comments)  [] Plan of care initiated [] Hold pending MD visit [] Discharge    Plan for Next Session: See above, appears to have a C7 irritation with T1 rotation.      Electronically signed by:  Dona Zazueta, PT,

## 2020-07-30 ENCOUNTER — HOSPITAL ENCOUNTER (OUTPATIENT)
Dept: PHYSICAL THERAPY | Age: 50
Setting detail: THERAPIES SERIES
Discharge: HOME | End: 2020-07-30
Payer: MEDICAID

## 2020-07-30 PROCEDURE — 97110 THERAPEUTIC EXERCISES: CPT

## 2020-07-30 PROCEDURE — 97140 MANUAL THERAPY 1/> REGIONS: CPT

## 2020-07-30 NOTE — FLOWSHEET NOTE
Physical Therapy Daily Treatment Note  Date:  2020    Patient Name:  Yogesh Graham    :  1970  MRN: [id number]    Restrictions/Precautions:      Pertinent Medical History:      Medical/Treatment Diagnosis Information:  · Diagnosis: Cervical radiculopathy, DDD c spine  · Treatment Diagnosis: Poor posture (slouched), decreased ROM of cervical spine and left UE, diminished sensation on left C5-6 dermatome, pain on neck and left shoulder/upper arm    Insurance/Certification information:  PT Insurance Information: [employer]  Physician Information:  Referring Practitioner: DONNA Aguilar  Plan of care signed (Y/N):  Sent to [facility]    Visit# / total visits: -  Pain level:  0/10     G-Code (if applicable):      Date / Visit # G-Code Applied:  /       Progress Note: []  Yes  [x]  No  Next due by: Visit #10      History of Injury: See below    Subjective:  Subjective  Subjective: Pain subsided one week after last therapy session ended in March before COVID closure of this PT clinic. . It has been well since then, until 3 weeks ago when pain returned to neck and left upper arm  without incident or injury. No paresthesias. 20: Pt reports pain is less than it was.  20: Pt reports that he has less pain and the left shoulder felt really good. 20: Pt reports that he is painfree at this time and yesterday. Had pain Friday and Saturday though. 20: \"No pain for two days. \"  Objective: See eval  Observation: Good cervical ROM but noted several tight knots on left scapular border and upper trap     Test measurements:      Exercises:  Exercise/Equipment Resistance/Repetitions Other comments   Upper trap stretch5 sec  x5    Lev scap stretch5 sec x 5 Added to HEP   Ext rot by clasping hands behind neck and pressing elbows back as tolerated.  8X Added 20   Prone scap addX 5 Added to HEP   Prone upper body raisesX 10    30 x 3    Standing rows/ext Blue band x 30 with 2 sec hold 7/30/20   R sidelying scap mobs UE ranger 20X 7/30/20   Scap protraction/retaction 20X 7/30/20                               Other Therapeutic Activities:  Patient was educated on diagnosis, plan of care and prognosis of their complaint. Also, frequency and duration of treatments was discussed. Patient was informed of the attendance policy and issued a copy for their records. Reviewed posture and importance of good posture. Home Exercise Program: Has from last PT session,  will review next time and add more. Pt was given written instructions for new exercises. 7/30/20: Added rows, ext and scap protraction/retraction to HEP. Manual Treatments:    Manual traction, STM to upper trap, lev scap, trapezius, and posterior scalenes while seated. Thoracic mobs . Mfr to teres and infra on the left, left traps counterstrain, , T1 left in flex , sidelying on right side scapular mobs x 25 min    Modalities:    Omitted at patient request.    Charges: Therapeutic Exercise:  [x] (52708) Provided verbal/tactile cueing for activities to restore or maintain strength, flexibility, endurance, ROM for improvements with self-care, mobility, lifting and ambulation. Neuromuscular Re-Education  [] (57166) Provided verbal/tactile cueing for activities to restore or maintain balance, coordination, kinesthetic sense, posture, motor skill, proprioception for self-care, mobility, lifting, and ambulation. Therapeutic Activities:    [] (72876) Provided verbal/tactile cueing to address functional limitations related to loss of mobility, strength, balance, and coordination.      Gait Training:  [] (31080) Provided training and instruction to the patient for proper postural muscle recruitment and positioning with ambulation re-education     Home Exercise Program:    [x] (35479) Reviewed/Progressed HEP activities related to strengthening, flexibility, endurance, ROM for functional self-care, mobility, lifting and ambulation   [] (30125) Reviewed/Progressed HEP activities related to improving balance, coordination, kinesthetic sense, posture, motor skill, proprioception for self-care, mobility, lifting, and ambulation      Manual Treatments:  MFR / STM / Oscillations-Mobs:  G-I, II, III, IV / Manipulation / MLD  [x] (86023) Provided manual therapy to mobilize  soft tissue/joints/fluid for the purpose of modulating pain, promoting relaxation, increasing ROM, reducing/eliminating soft tissue swelling/inflammation/restriction, improving soft tissue extensibility and allowing for proper ROM for normal function with self- care, mobility, lifting and ambulation. Timed Code Treatment Minutes: 48   Total Treatment Minutes: 48     [] EVAL (LOW) 74619   [] EVAL (MOD) 05776   [] EVAL (HIGH) 85243   [] RE-EVAL   [x] TE (86613) x 1      [] NMR (29981)   x    [x] Manual (40144) x 2  [] Ultrasound (27246) x  [] TA (79868) x  [] Mech Traction (54022)  [] Ionto (28915)           [] ES (un) (82808):   [] Other:  Briefly reviewed above exercises and corrected tchnique    Treatment/Activity Tolerance:  [x] Patient tolerated treatment well [] Patient limited by fatigue  [] Patient limited by pain  [] Patient limited by other medical complications  [] Other:     Prognosis: [x] Good [] Fair  [] Poor    Goals:    Short term goals  Time Frame for Short term goals: 4 weeks  Short term goal 1: Pt will be aware of proper posture to decrease stress on c spine. Short term goal 2: Pt will increase cervical spinal ROM to WNL  Short term goal 3: Pt will be able to increase Left shoulder and cervical mobility to do ADL's with greater ease        Patient goals : \"Feel better\"      Patient Requires Follow-up: [x] Yes  [] No    Plan:   [x] Continue per plan of care [] Alter current plan (see comments)  [] Plan of care initiated [] Hold pending MD visit [] Discharge    Plan for Next Session: See above, appears to have a C7 irritation with T1 rotation.      Electronically signed by:   HO HENDRIX, PT,

## 2020-08-03 ENCOUNTER — HOSPITAL ENCOUNTER (OUTPATIENT)
Dept: PHYSICAL THERAPY | Age: 50
Setting detail: THERAPIES SERIES
Discharge: HOME | End: 2020-08-03
Payer: MEDICAID

## 2020-08-03 NOTE — FLOWSHEET NOTE
Physical Therapy  Cancellation/No-show Note  Patient Name:  Claude Isidro  :  1970   Date:  8/3/2020  Cancelled visits to date: 1  No-shows to date: 0    For today's appointment patient:  [x]  Cancelled  []  Rescheduled appointment  []  No-show     Reason given by patient:  []  Patient ill  []  Conflicting appointment  []  No transportation    []  Conflict with work  []  No reason given  [x]  Other:     Comments: He has to take his daughter to urgent care.      Electronically signed by:  Dimas Craft PT

## 2020-08-06 ENCOUNTER — HOSPITAL ENCOUNTER (OUTPATIENT)
Dept: PHYSICAL THERAPY | Age: 50
Setting detail: THERAPIES SERIES
Discharge: HOME | End: 2020-08-06
Payer: MEDICAID

## 2020-08-06 PROCEDURE — 97530 THERAPEUTIC ACTIVITIES: CPT

## 2020-08-06 PROCEDURE — 97140 MANUAL THERAPY 1/> REGIONS: CPT

## 2020-08-06 NOTE — PROGRESS NOTES
Physical Therapy      Outpatient Physical Therapy  [] [facility]    Phone: [phone]   Fax: [phone]   [x] [facility]  Phone: [phone]   Fax: [phone]  [] [facility]              Phone: [phone]   Fax: [phone]     Physical Therapy Progress/Discharge Note  Date: 8/6/2020        Patient Name:  Randee Pemberton:  1970  MRN: [id number]  Restrictions/Precautions:    · Diagnosis:  Cervical radiculopathy, DDD c spine  · Treatment Diagnosis: Poor posture (slouched), decreased ROM of cervical spine and left UE, diminished sensation on left C5-6 dermatome, pain on neck and left shoulder/upper arm     Insurance/Certification information:  PT Insurance Information: [employer]  Physician Information:  Referring Practitioner: DONNA Fuentes  Plan of care signed (Y/N):  Sent to [facility] 7/13/20     Visit# / total visits: 7/8-12  Pain level:       0/10         G-Code (if applicable):                                             Date / Visit # G-Code Applied:  /   On DC Quick Dash = 13/CI       Time Period for Report:  7/13/20-8/6/20  Cancels/No-shows to date:  1 cx    Plan of Care/Treatment to date:  [x] Therapeutic Exercise    [x] Modalities:  [x] Therapeutic Activity     [] Ultrasound  [] Electrical Stimulation  [] Gait Training      [] Cervical Traction    [] Lumbar Traction  [] Neuromuscular Re-education  [] Cold/hotpack [] Iontophoresis  [x] Instruction in HEP      Other:  [x] Manual Therapy       []    [] Aquatic Therapy       []                          Significant Findings At Last Visit/Comments:    Subjective:      Pain subsided one week after last therapy session ended in March before COVID closure of this PT clinic. . It has been well since then, until 3 weeks ago when pain returned to neck and left upper arm  without incident or injury.  No paresthesias.     7/20/20: Pt reports pain is less than it was.  7/23/20: Pt reports that he has less pain and the left shoulder felt really good.  7/27/20: Pt reports that he is painfree at this time and yesterday. Had pain Friday and Saturday though. 7/30/20: \"No pain for two days. \"  8/6/20: Pt reports no pain for one week. No pain since he was here last.     Objective:   Observation: Good cervical ROM but noted several tight knots on left scapular border and upper trap  ·    Test measurements: Cervical ROM: Full ROM, just slight pain on left with side bending to right. Full Left UE ROM.       Assessment:   Summary: Pt reports that he has been painfree for more than one week. He has excellent ROM of neck and left arm and has met his PT goals. He will continue his HEP. Progression Towards Functional goals:  [x] Patient is progressing as expected towards functional goals listed. [] Progression is slowed due to complexities listed. [] Progression has been slowed due to co-morbidities. [] Plan just implemented, too soon to assess goals progression  [] Other:    Goals:  Short term goals  Time Frame for Short term goals: 4 weeks  Short term goal 1: Pt will be aware of proper posture to decrease stress on c spine./ MET  Short term goal 2: Pt will increase cervical spinal ROM to WNL/MET  Short term goal 3: Pt will be able to increase Left shoulder and cervical mobility to do ADL's with greater ease /MET       Patient goals :  \"Feel better\"/ MET          Rehab Potential: [] Excellent [x] Good [] Fair  [] Poor     Goal Status:  [x] Achieved [] Partially Achieved  [] Not Achieved     Current Frequency/Duration:  # Days per week: [] 1 day # Weeks: [] 1 week [x] 4 weeks      [x] 2 days   [] 2 weeks [] 5 weeks      [] 3 days   [] 3 weeks [] 6 weeks     Patient Status: [] Continue per initial plan of Care     [x] Patient now discharged     [] Additional visits requested, Please re-certify for additional visits:      Requested frequency/duration:  X/week for weeks    Electronically signed by:  Boy Cross PT    If you have any questions or concerns, please don't hesitate to call.   Thank you for your referral.    Physician Signature:________________________________Date:__________________  By signing above, therapists plan is approved by physician

## 2020-08-06 NOTE — FLOWSHEET NOTE
Physical Therapy Daily Treatment Note  Date:  2020    Patient Name:  Ranjan Contreras    :  1970  MRN: [id number]    Restrictions/Precautions:      Pertinent Medical History:      Medical/Treatment Diagnosis Information:  · Diagnosis: Cervical radiculopathy, DDD c spine  · Treatment Diagnosis: Poor posture (slouched), decreased ROM of cervical spine and left UE, diminished sensation on left C5-6 dermatome, pain on neck and left shoulder/upper arm    Insurance/Certification information:  PT Insurance Information: Alessio  Physician Information:  Referring Practitioner: DONNA Frost Mai  Plan of care signed (Y/N):  Sent to [facility] 20    Visit# / total visits: -  Pain level:  0/10     G-Code (if applicable):      Date / Visit # G-Code Applied:  /   On DC Quick Dash = 13/CI    Progress Note: []  Yes  [x]  No  Next due by: Visit #10      History of Injury: See below    Subjective:  Subjective  Subjective: Pain subsided one week after last therapy session ended in March before COVID closure of this PT clinic. . It has been well since then, until 3 weeks ago when pain returned to neck and left upper arm  without incident or injury. No paresthesias. 20: Pt reports pain is less than it was.  20: Pt reports that he has less pain and the left shoulder felt really good. 20: Pt reports that he is painfree at this time and yesterday. Had pain Friday and Saturday though. 20: \"No pain for two days. \"  20: Pt reports no pain for one week. No pain since he was here last.    Objective:   Observation: Good cervical ROM but noted several tight knots on left scapular border and upper trap     Test measurements: Cervical ROM: Full ROM, just slight pain on left with side bending to right. Full Left UE ROM.       Exercises:  Exercise/Equipment Resistance/Repetitions Other comments   Upper trap stretch5 sec  x5    Lev scap stretch5 sec x 5 Added to HEP   Ext rot by clasping hands behind neck and pressing elbows back as tolerated. 8X Added 7/23/20   Prone scap addX 5 Added to HEP   Prone upper body raisesX 10 8/6/20: Gave him written instructions   30 x 3    Standing rows/ext Blue band x 30 with 2 sec hold 7/30/20   R sidelying scap mobs UE ranger 20X 7/30/20   Scap protraction/retaction 20X 7/30/20                               Other Therapeutic Activities:  Patient was educated on diagnosis, plan of care and prognosis of their complaint. Also, frequency and duration of treatments was discussed. Patient was informed of the attendance policy and issued a copy for their records. Reviewed posture and importance of good posture. 8/6/20: Reviewed Goals and Quick Dash. Home Exercise Program: Has from last PT session,  will review next time and add more. Pt was given written instructions for new exercises. 7/30/20: Added rows, ext and scap protraction/retraction to HEP. 8/6/20: Reviewed all of HEP as above. Manual Treatments:    Manual traction, STM to upper trap, lev scap, trapezius, and posterior scalenes while seated. Thoracic mobs . Mfr to teres and infra on the left, left traps counterstrain, , T1 left in flex , sidelying on right side scapular mobs x 12 min    Modalities:    Omitted at patient request.    Charges: Therapeutic Exercise:  [] (59431) Provided verbal/tactile cueing for activities to restore or maintain strength, flexibility, endurance, ROM for improvements with self-care, mobility, lifting and ambulation. Neuromuscular Re-Education  [] (63669) Provided verbal/tactile cueing for activities to restore or maintain balance, coordination, kinesthetic sense, posture, motor skill, proprioception for self-care, mobility, lifting, and ambulation. Therapeutic Activities:    [x] (26378) Provided verbal/tactile cueing to address functional limitations related to loss of mobility, strength, balance, and coordination.      Gait Training:  [] (49436) Provided training and instruction to do ADL's with greater ease /MET       Patient goals : \"Feel better\"/ MET      Patient Requires Follow-up: [] Yes  [x] No    Plan:   [] Continue per plan of care [] Alter current plan (see comments)  [] Plan of care initiated [] Hold pending MD visit [x] Discharge    Plan for Next Session: DC from PT. Continue to observe posture and to do HEP.     Electronically signed by:  Odalis Smith PT,

## 2020-12-26 ENCOUNTER — HOSPITAL ENCOUNTER (EMERGENCY)
Age: 50
Discharge: HOME | End: 2020-12-26
Payer: COMMERCIAL

## 2020-12-26 VITALS
BODY MASS INDEX: 28.01 KG/M2 | HEART RATE: 81 BPM | DIASTOLIC BLOOD PRESSURE: 97 MMHG | HEIGHT: 72 IN | SYSTOLIC BLOOD PRESSURE: 157 MMHG | TEMPERATURE: 98.5 F | OXYGEN SATURATION: 100 % | WEIGHT: 206.79 LBS | RESPIRATION RATE: 18 BRPM

## 2020-12-26 PROCEDURE — 6370000000 HC RX 637 (ALT 250 FOR IP): Performed by: PHYSICIAN ASSISTANT

## 2020-12-26 PROCEDURE — 99285 EMERGENCY DEPT VISIT HI MDM: CPT

## 2020-12-26 RX ORDER — CLONIDINE HYDROCHLORIDE 0.1 MG/1
0.1 TABLET ORAL ONCE
Status: COMPLETED | OUTPATIENT
Start: 2020-12-26 | End: 2020-12-26

## 2020-12-26 RX ORDER — AMLODIPINE BESYLATE 5 MG/1
10 TABLET ORAL DAILY
Status: DISCONTINUED | OUTPATIENT
Start: 2020-12-26 | End: 2020-12-26 | Stop reason: HOSPADM

## 2020-12-26 RX ORDER — ACETAMINOPHEN 500 MG
1000 TABLET ORAL ONCE
Status: COMPLETED | OUTPATIENT
Start: 2020-12-26 | End: 2020-12-26

## 2020-12-26 RX ADMIN — ACETAMINOPHEN 1000 MG: 500 TABLET ORAL at 10:25

## 2020-12-26 RX ADMIN — CLONIDINE HYDROCHLORIDE 0.1 MG: 0.1 TABLET ORAL at 12:03

## 2020-12-26 RX ADMIN — AMLODIPINE BESYLATE 10 MG: 5 TABLET ORAL at 10:25

## 2020-12-26 ASSESSMENT — ENCOUNTER SYMPTOMS
NAUSEA: 0
BACK PAIN: 0
EYE DISCHARGE: 0
APNEA: 0
FACIAL SWELLING: 0
CHOKING: 0
SHORTNESS OF BREATH: 0
VOMITING: 0
ABDOMINAL PAIN: 0
EYE REDNESS: 0

## 2020-12-26 ASSESSMENT — PAIN DESCRIPTION - PROGRESSION: CLINICAL_PROGRESSION: NOT CHANGED

## 2020-12-26 ASSESSMENT — PAIN SCALES - GENERAL
PAINLEVEL_OUTOF10: 9
PAINLEVEL_OUTOF10: 3

## 2020-12-26 ASSESSMENT — PAIN DESCRIPTION - DESCRIPTORS: DESCRIPTORS: THROBBING

## 2020-12-26 ASSESSMENT — PAIN DESCRIPTION - PAIN TYPE: TYPE: ACUTE PAIN

## 2020-12-26 ASSESSMENT — PAIN DESCRIPTION - ONSET: ONSET: ON-GOING

## 2020-12-26 ASSESSMENT — PAIN DESCRIPTION - LOCATION: LOCATION: HEAD

## 2020-12-26 ASSESSMENT — PAIN - FUNCTIONAL ASSESSMENT: PAIN_FUNCTIONAL_ASSESSMENT: ACTIVITIES ARE NOT PREVENTED

## 2020-12-26 ASSESSMENT — PAIN DESCRIPTION - FREQUENCY: FREQUENCY: CONTINUOUS

## 2020-12-26 NOTE — ED NOTES
D/C: Order noted for d/c. Pt confirmed d/c paperwork does have correct name. Discharge and education instructions reviewed with patient. Teach-back successful. Pt verbalized understanding and signed d/c papers. Pt denied questions at this time. No acute distress noted. Patient instructed to follow-up as noted - return to emergency department if symptoms worsen. Patient verbalized understanding. Discharged per EDMD with discharge instructions. Pt discharged to private vehicle. Patient stable upon departure. Thanked patient for choosing [facility] for care. Provider aware of patient pain at time of discharge.        Carolyn Dalton RN  12/26/20 5219

## 2020-12-26 NOTE — ED NOTES
[facility].       Jade Schmidt RN  12/26/20 9512

## 2020-12-26 NOTE — ED PROVIDER NOTES
**ADVANCED PRACTICE PROVIDER, I HAVE EVALUATED THIS PATIENT**        [address] ENCOUNTER      Pt Name: Garrett Bernal  IKY:[id number]  [place] 1970  Date of evaluation: 12/26/2020  Provider: Arvin Bauer PA-C      Chief Complaint:    Chief Complaint   Patient presents with    Headache     intermittently x 1 month with dizziness, h/o hypertension, not taking meds regularly, has taken BP meds the past 3 days       Nursing Notes, Past Medical Hx, Past Surgical Hx, Social Hx, Allergies, and Family Hx were all reviewed and agreed with or any disagreements were addressed in the HPI.    HPI:  (Location, Duration, Timing, Severity, Quality, Assoc Sx, Context, Modifying factors)  This is a  53 y.o. male complain of off-and-on headache for over a month now. Denies visual changes, no photophobia. No weaknesses. No neck pain or neck stiffness, denies fever. No cough. States the headache is more in the top of his head. No visual changes. Does complain of his blood pressure being elevated the last few days. He is taking his blood pressure medication every day. No chest pain. No past history of migraines.       PastMedical/Surgical History:      Diagnosis Date    Arthritis     Asthma     Bell's palsy 9/5/12    Cerebral artery occlusion with cerebral infarction ([facility] [address]     over 12years; states no physical residual, but poor memory    Chronic pain disorder 10/29/10    Depression 7/13/11    Gout 02/15/2011    toe    Hx of blood clots     Hyperlipidemia     Hypertension 401.9    Long term (current) use of systemic steroids 01/20/2016    Mood swings 3/24/10    Sarcoidosis 3/7/10         Procedure Laterality Date    CYSTOSCOPY N/A 4/8/2019    CYSTOSCOPY performed by Allison Goodwin MD at [facility] ENDOSCOPY, COLON, DIAGNOSTIC         Medications:  Previous Medications    ALLOPURINOL (ZYLOPRIM) 100 MG TABLET    Take 1 tablet by mouth daily AMLODIPINE (NORVASC) 10 MG TABLET    Take 1 tablet by mouth daily    IBUPROFEN (ADVIL;MOTRIN) 800 MG TABLET    Take 1 tablet by mouth every 8 hours as needed for Pain    MONTELUKAST (SINGULAIR) 10 MG TABLET    Take 1 tablet by mouth nightly    OMEPRAZOLE (PRILOSEC) 20 MG DELAYED RELEASE CAPSULE    Take 1 capsule by mouth daily    PREDNISONE (DELTASONE) 20 MG TABLET    Take 20 mg by mouth daily    VILAZODONE HCL 10 & 20 & 40 MG KIT    Take 1 each by mouth daily         Review of Systems:  Review of Systems   Constitutional: Negative for chills and fever. HENT: Negative for congestion, facial swelling and sneezing. Eyes: Negative for discharge and redness. Respiratory: Negative for apnea, choking and shortness of breath. Cardiovascular: Negative for chest pain. Gastrointestinal: Negative for abdominal pain, nausea and vomiting. Genitourinary: Negative for dysuria. Musculoskeletal: Negative for back pain, neck pain and neck stiffness. Neurological: Positive for headaches. Negative for dizziness, tremors and seizures. All other systems reviewed and are negative. Positives and Pertinent negatives as per HPI. Except as noted above in the ROS, problem specific ROS was completed and is negative. Physical Exam:  Physical Exam  Vitals signs and nursing note reviewed. Constitutional:       Appearance: He is well-developed. He is not diaphoretic. HENT:      Head: Normocephalic and atraumatic. Nose: Nose normal.      Mouth/Throat:      Mouth: Mucous membranes are moist.      Pharynx: Oropharynx is clear. Eyes:      General: No scleral icterus. Right eye: No discharge. Left eye: No discharge. Extraocular Movements: Extraocular movements intact. Right eye: No nystagmus. Left eye: No nystagmus. Conjunctiva/sclera: Conjunctivae normal.      Pupils: Pupils are equal, round, and reactive to light.    Neck:      Musculoskeletal: Normal range of motion and neck mg Oral Given 12/26/20 1025)   acetaminophen (TYLENOL) tablet 1,000 mg (1,000 mg Oral Given 12/26/20 1025)   cloNIDine (CATAPRES) tablet 0.1 mg (0.1 mg Oral Given 12/26/20 1203)     Emergency room course: Patient on exam pupils equal round and reactive to light extraocular movement is intact. No nystagmus noted. Throat is clear. Neck is supple full range of motion without nuchal rigidity. No tenderness. No midline tenderness cervical, thoracic or lumbar spine. Cardiovascular regular rhythm, lungs are clear. No wheeze, rales or rhonchi. Abdomen is soft nontender. Normal bowel sounds all 4 quadrant. Bilateral lower extremities show no edema. Patient has no facial drooping, no slurred speech noted. Tongue does not deviate. Smile does not deviate. No pronator drift upper or lower extremity. Alert oriented x4. Does not appear to be in acute distress. Patient was given his blood pressure medication. Also given Motrin 800 mg and Tylenol 1 g p.o. here in emergency room. He was given lisinopril 10 mg p.o. After several hours of monitoring this patient has no improvement in his blood pressure. I did give him clonidine 0.1 mg p.o. Patient after another hour states that it pain level better headache has improved and reevaluation of blood pressure show that it has improved. His blood pressure now 157/97. At this time I discussed with patient discharge plan. Follow-up with his primary care physician. I informed him he may have to be started on another medication to help control his blood pressure. He is to do daily monitoring of his blood pressure. Return for any worsening. He understood discharge plan. He will be discharged stable condition. The patient tolerated their visit well. I evaluated the patient. The physician was available for consultation as needed.   The patient and / or the family were informed of the results of any tests, a time was given to answer questions, a plan was proposed and they agreed with plan. CLINICAL IMPRESSION:  1. Nonintractable headache, unspecified chronicity pattern, unspecified headache type    2.  Elevated blood pressure reading with diagnosis of hypertension        DISPOSITION Decision To Discharge 12/26/2020 12:29:21 PM      PATIENT REFERRED TO:  Huong Wong MD  [facility] [address] [facility] [address]  [phone]    Call in 2 days        DISCHARGE MEDICATIONS:  New Prescriptions    No medications on file       DISCONTINUED MEDICATIONS:  Discontinued Medications    No medications on file              (Please note the MDM and HPI sections of this note were completed with a voice recognition program.  Efforts were made to edit the dictations but occasionally words are mis-transcribed.)    Electronically signed, Fanta Ram PA-C,          Fanta Ram PA-C  12/26/20 1950

## 2020-12-28 ENCOUNTER — APPOINTMENT (OUTPATIENT)
Dept: CT IMAGING | Age: 50
End: 2020-12-28
Payer: COMMERCIAL

## 2020-12-28 ENCOUNTER — APPOINTMENT (OUTPATIENT)
Dept: GENERAL RADIOLOGY | Age: 50
End: 2020-12-28
Payer: COMMERCIAL

## 2020-12-28 ENCOUNTER — HOSPITAL ENCOUNTER (EMERGENCY)
Age: 50
Discharge: HOME | End: 2020-12-28
Payer: COMMERCIAL

## 2020-12-28 VITALS
BODY MASS INDEX: 28.05 KG/M2 | WEIGHT: 206.79 LBS | TEMPERATURE: 98.7 F | RESPIRATION RATE: 16 BRPM | SYSTOLIC BLOOD PRESSURE: 174 MMHG | OXYGEN SATURATION: 98 % | HEART RATE: 98 BPM | DIASTOLIC BLOOD PRESSURE: 91 MMHG

## 2020-12-28 LAB
AMORPHOUS: ABNORMAL /HPF
AMPHETAMINE SCREEN, URINE: ABNORMAL
ANION GAP SERPL CALCULATED.3IONS-SCNC: 16 MMOL/L (ref 3–16)
BACTERIA: ABNORMAL /HPF
BARBITURATE SCREEN URINE: ABNORMAL
BASOPHILS ABSOLUTE: 0.1 K/UL (ref 0–0.2)
BASOPHILS RELATIVE PERCENT: 0.5 %
BENZODIAZEPINE SCREEN, URINE: ABNORMAL
BILIRUBIN URINE: NEGATIVE
BLOOD, URINE: ABNORMAL
BUN BLDV-MCNC: 11 MG/DL (ref 7–20)
CALCIUM SERPL-MCNC: 10.2 MG/DL (ref 8.3–10.6)
CANNABINOID SCREEN URINE: POSITIVE
CHLORIDE BLD-SCNC: 103 MMOL/L (ref 99–110)
CLARITY: CLEAR
CO2: 21 MMOL/L (ref 21–32)
COCAINE METABOLITE SCREEN URINE: ABNORMAL
COLOR: ABNORMAL
CREAT SERPL-MCNC: 1 MG/DL (ref 0.9–1.3)
EOSINOPHILS ABSOLUTE: 0 K/UL (ref 0–0.6)
EOSINOPHILS RELATIVE PERCENT: 0.1 %
EPITHELIAL CELLS, UA: 1 /HPF (ref 0–5)
GFR AFRICAN AMERICAN: >60
GFR NON-AFRICAN AMERICAN: >60
GLUCOSE BLD-MCNC: 129 MG/DL (ref 70–99)
GLUCOSE URINE: NEGATIVE MG/DL
HCT VFR BLD CALC: 42 % (ref 40.5–52.5)
HEMOGLOBIN: 14.1 G/DL (ref 13.5–17.5)
HYALINE CASTS: 3 /LPF (ref 0–8)
KETONES, URINE: ABNORMAL MG/DL
LACTIC ACID: 1.7 MMOL/L (ref 0.4–2)
LEUKOCYTE ESTERASE, URINE: ABNORMAL
LYMPHOCYTES ABSOLUTE: 1.1 K/UL (ref 1–5.1)
LYMPHOCYTES RELATIVE PERCENT: 7 %
Lab: ABNORMAL
MCH RBC QN AUTO: 30.2 PG (ref 26–34)
MCHC RBC AUTO-ENTMCNC: 33.6 G/DL (ref 31–36)
MCV RBC AUTO: 89.8 FL (ref 80–100)
METHADONE SCREEN, URINE: ABNORMAL
MICROSCOPIC EXAMINATION: YES
MONOCYTES ABSOLUTE: 0.7 K/UL (ref 0–1.3)
MONOCYTES RELATIVE PERCENT: 4.3 %
MUCUS: ABNORMAL /LPF
NEUTROPHILS ABSOLUTE: 13.9 K/UL (ref 1.7–7.7)
NEUTROPHILS RELATIVE PERCENT: 88.1 %
NITRITE, URINE: NEGATIVE
OPIATE SCREEN URINE: ABNORMAL
OXYCODONE URINE: ABNORMAL
PDW BLD-RTO: 14.8 % (ref 12.4–15.4)
PH UA: 6
PH UA: 6 (ref 5–8)
PHENCYCLIDINE SCREEN URINE: ABNORMAL
PLATELET # BLD: 240 K/UL (ref 135–450)
PMV BLD AUTO: 7.6 FL (ref 5–10.5)
POTASSIUM REFLEX MAGNESIUM: 3.6 MMOL/L (ref 3.5–5.1)
PROPOXYPHENE SCREEN: ABNORMAL
PROTEIN UA: 30 MG/DL
RAPID INFLUENZA  B AGN: NEGATIVE
RAPID INFLUENZA A AGN: NEGATIVE
RBC # BLD: 4.68 M/UL (ref 4.2–5.9)
RBC UA: 38 /HPF (ref 0–4)
REASON FOR REJECTION: NORMAL
REJECTED TEST: NORMAL
SARS-COV-2, NAAT: NOT DETECTED
SODIUM BLD-SCNC: 140 MMOL/L (ref 136–145)
SPECIFIC GRAVITY UA: 1.02 (ref 1–1.03)
URINE REFLEX TO CULTURE: ABNORMAL
URINE TYPE: ABNORMAL
UROBILINOGEN, URINE: 0.2 E.U./DL
WBC # BLD: 15.7 K/UL (ref 4–11)
WBC UA: 4 /HPF (ref 0–5)
YEAST: PRESENT /HPF

## 2020-12-28 PROCEDURE — 96372 THER/PROPH/DIAG INJ SC/IM: CPT

## 2020-12-28 PROCEDURE — 83605 ASSAY OF LACTIC ACID: CPT

## 2020-12-28 PROCEDURE — 81001 URINALYSIS AUTO W/SCOPE: CPT

## 2020-12-28 PROCEDURE — 36415 COLL VENOUS BLD VENIPUNCTURE: CPT

## 2020-12-28 PROCEDURE — 6370000000 HC RX 637 (ALT 250 FOR IP): Performed by: NURSE PRACTITIONER

## 2020-12-28 PROCEDURE — 87804 INFLUENZA ASSAY W/OPTIC: CPT

## 2020-12-28 PROCEDURE — U0002 COVID-19 LAB TEST NON-CDC: HCPCS

## 2020-12-28 PROCEDURE — 96365 THER/PROPH/DIAG IV INF INIT: CPT

## 2020-12-28 PROCEDURE — 6360000002 HC RX W HCPCS: Performed by: NURSE PRACTITIONER

## 2020-12-28 PROCEDURE — 80307 DRUG TEST PRSMV CHEM ANLYZR: CPT

## 2020-12-28 PROCEDURE — 80048 BASIC METABOLIC PNL TOTAL CA: CPT

## 2020-12-28 PROCEDURE — 6360000004 HC RX CONTRAST MEDICATION: Performed by: NURSE PRACTITIONER

## 2020-12-28 PROCEDURE — 74177 CT ABD & PELVIS W/CONTRAST: CPT

## 2020-12-28 PROCEDURE — 2580000003 HC RX 258: Performed by: NURSE PRACTITIONER

## 2020-12-28 PROCEDURE — 85025 COMPLETE CBC W/AUTO DIFF WBC: CPT

## 2020-12-28 PROCEDURE — 96375 TX/PRO/DX INJ NEW DRUG ADDON: CPT

## 2020-12-28 PROCEDURE — 71046 X-RAY EXAM CHEST 2 VIEWS: CPT

## 2020-12-28 PROCEDURE — 99284 EMERGENCY DEPT VISIT MOD MDM: CPT

## 2020-12-28 RX ORDER — ONDANSETRON 2 MG/ML
4 INJECTION INTRAMUSCULAR; INTRAVENOUS EVERY 30 MIN PRN
Status: DISCONTINUED | OUTPATIENT
Start: 2020-12-28 | End: 2020-12-29 | Stop reason: HOSPADM

## 2020-12-28 RX ORDER — SODIUM CHLORIDE, SODIUM LACTATE, POTASSIUM CHLORIDE, CALCIUM CHLORIDE 600; 310; 30; 20 MG/100ML; MG/100ML; MG/100ML; MG/100ML
1000 INJECTION, SOLUTION INTRAVENOUS ONCE
Status: COMPLETED | OUTPATIENT
Start: 2020-12-28 | End: 2020-12-28

## 2020-12-28 RX ORDER — ACETAMINOPHEN 500 MG
1000 TABLET ORAL ONCE
Status: COMPLETED | OUTPATIENT
Start: 2020-12-28 | End: 2020-12-28

## 2020-12-28 RX ORDER — DICYCLOMINE HYDROCHLORIDE 10 MG/1
10 CAPSULE ORAL EVERY 6 HOURS PRN
Qty: 20 CAPSULE | Refills: 0 | Status: SHIPPED | OUTPATIENT
Start: 2020-12-28

## 2020-12-28 RX ORDER — ONDANSETRON 4 MG/1
4 TABLET, ORALLY DISINTEGRATING ORAL EVERY 8 HOURS PRN
Qty: 20 TABLET | Refills: 0 | Status: SHIPPED | OUTPATIENT
Start: 2020-12-28

## 2020-12-28 RX ORDER — DROPERIDOL 2.5 MG/ML
1.25 INJECTION, SOLUTION INTRAMUSCULAR; INTRAVENOUS ONCE
Status: COMPLETED | OUTPATIENT
Start: 2020-12-28 | End: 2020-12-28

## 2020-12-28 RX ORDER — DICYCLOMINE HYDROCHLORIDE 10 MG/ML
10 INJECTION INTRAMUSCULAR ONCE
Status: COMPLETED | OUTPATIENT
Start: 2020-12-28 | End: 2020-12-28

## 2020-12-28 RX ADMIN — ONDANSETRON 4 MG: 2 INJECTION INTRAMUSCULAR; INTRAVENOUS at 18:17

## 2020-12-28 RX ADMIN — CEFTRIAXONE 1 G: 1 INJECTION, POWDER, FOR SOLUTION INTRAMUSCULAR; INTRAVENOUS at 21:49

## 2020-12-28 RX ADMIN — ACETAMINOPHEN 1000 MG: 500 TABLET ORAL at 18:10

## 2020-12-28 RX ADMIN — DROPERIDOL 1.25 MG: 2.5 INJECTION, SOLUTION INTRAMUSCULAR; INTRAVENOUS at 21:49

## 2020-12-28 RX ADMIN — DICYCLOMINE HYDROCHLORIDE 10 MG: 20 INJECTION, SOLUTION INTRAMUSCULAR at 18:17

## 2020-12-28 RX ADMIN — SODIUM CHLORIDE, POTASSIUM CHLORIDE, SODIUM LACTATE AND CALCIUM CHLORIDE 1000 ML: 600; 310; 30; 20 INJECTION, SOLUTION INTRAVENOUS at 18:40

## 2020-12-28 RX ADMIN — IOPAMIDOL 75 ML: 755 INJECTION, SOLUTION INTRAVENOUS at 18:34

## 2020-12-28 ASSESSMENT — PAIN DESCRIPTION - DESCRIPTORS
DESCRIPTORS: ACHING
DESCRIPTORS: ACHING

## 2020-12-28 ASSESSMENT — ENCOUNTER SYMPTOMS
BACK PAIN: 0
WHEEZING: 0
COLOR CHANGE: 0
SHORTNESS OF BREATH: 0
COUGH: 0

## 2020-12-28 ASSESSMENT — PAIN DESCRIPTION - ONSET
ONSET: ON-GOING
ONSET: ON-GOING

## 2020-12-28 ASSESSMENT — PAIN DESCRIPTION - LOCATION
LOCATION: GENERALIZED
LOCATION: GENERALIZED

## 2020-12-28 ASSESSMENT — PAIN DESCRIPTION - PAIN TYPE
TYPE: ACUTE PAIN
TYPE: ACUTE PAIN

## 2020-12-28 ASSESSMENT — PAIN SCALES - GENERAL
PAINLEVEL_OUTOF10: 6
PAINLEVEL_OUTOF10: 9
PAINLEVEL_OUTOF10: 9

## 2020-12-28 ASSESSMENT — PAIN DESCRIPTION - FREQUENCY
FREQUENCY: CONTINUOUS
FREQUENCY: CONTINUOUS

## 2020-12-28 ASSESSMENT — PAIN - FUNCTIONAL ASSESSMENT: PAIN_FUNCTIONAL_ASSESSMENT: PREVENTS OR INTERFERES SOME ACTIVE ACTIVITIES AND ADLS

## 2020-12-28 NOTE — ED PROVIDER NOTES
**ADVANCED PRACTICE PROVIDER, I HAVE EVALUATED THIS PATIENT**        [address]      Pt Name: Raul Prakash  EMJ:[id number]  [place] 1970  Date of evaluation: 12/28/2020  Provider: ART Hartmann - CNP      Chief Complaint:    Chief Complaint   Patient presents with    Fatigue     onset yesterday. fever, body aches, n/v/d. Nursing Notes, Past Medical Hx, Past Surgical Hx, Social Hx, Allergies, and Family Hx were all reviewed and agreed with or any disagreements were addressed in the HPI.    HPI:  (Location, Duration, Timing, Severity, Quality, Assoc Sx, Context, Modifying factors)  This is a  53 y.o. male who presents today with cough, congestion, body aches, tactile fever and chills, nausea, vomiting and diarrhea with abdominal cramping, patient states that he tested positive for covid 19 about one month ago. However, over the past two days he has become extremely \"sick\". Denies any new medications. Denies any alcohol or drug use. No urinary symptoms. He reports history of high blood pressure, complains of generalized body aches, slight headache but not the worst headache of his life however no neck pain or neck stiffness. He is mostly complaining of generalized abdominal pain, nausea vomiting and diarrhea. Rates the pain a 6 out of 10 and describes as a constant cramping sensation. States he is worried that he has influenza, states that he just does not feel well. He has not taken any medicine for symptoms, denies additional complaints, no additional aggravating relieving factors. Patient presents awake, alert and in no acute distress or toxic appearance however he does report that he really does not feel well.     PastMedical/Surgical History:      Diagnosis Date    Arthritis     Asthma     Bell's palsy 9/5/12    Cerebral artery occlusion with cerebral infarction ([facility] [address]     over 12years; states no physical residual, but poor memory    Chronic pain disorder 10/29/10    Depression 7/13/11    Gout 02/15/2011    toe    Hx of blood clots     Hyperlipidemia     Hypertension 401.9    Long term (current) use of systemic steroids 01/20/2016    Mood swings 3/24/10    Sarcoidosis 3/7/10         Procedure Laterality Date    CYSTOSCOPY N/A 4/8/2019    CYSTOSCOPY performed by Mayuri Isidro MD at [address], COLON, DIAGNOSTIC         Medications:  Discharge Medication List as of 12/28/2020 10:27 PM      CONTINUE these medications which have NOT CHANGED    Details   ibuprofen (ADVIL;MOTRIN) 800 MG tablet Take 1 tablet by mouth every 8 hours as needed for Pain, Disp-30 tablet, R-0Print      predniSONE (DELTASONE) 20 MG tablet Take 20 mg by mouth dailyHistorical Med      Vilazodone HCl 10 & 20 & 40 MG KIT Take 1 each by mouth daily, Disp-1 kit, R-0Normal      allopurinol (ZYLOPRIM) 100 MG tablet Take 1 tablet by mouth daily, Disp-30 tablet, R-3Normal      montelukast (SINGULAIR) 10 MG tablet Take 1 tablet by mouth nightly, Disp-30 tablet, R-5Normal      omeprazole (PRILOSEC) 20 MG delayed release capsule Take 1 capsule by mouth daily, Disp-30 capsule, R-3Normal      amLODIPine (NORVASC) 10 MG tablet Take 1 tablet by mouth daily, Disp-30 tablet, R-5               Review of Systems:  Review of Systems   Constitutional: Negative for chills and fever. He does complain of not feeling well, states she just feels sick, denies any known fever or chills but has had generalized body aches. HENT: Negative for congestion. Respiratory: Negative for cough, shortness of breath and wheezing. Patient complains of cough, congestion, body aches, not feeling well for the past several days. Denies any chest pain or pleuritic chest pain. Cardiovascular: Negative for chest pain. Gastrointestinal: Positive for abdominal pain, diarrhea, nausea and vomiting.         He also reports of abdominal cramping associated with nausea vomiting or diarrhea. Denies any urinary symptoms. Genitourinary: Negative for difficulty urinating, dysuria and frequency. Musculoskeletal: Negative for back pain. Skin: Negative for color change. Neurological: Negative for weakness, numbness and headaches. Positives and Pertinent negatives as per HPI. Except as noted above in the ROS, problem specific ROS was completed and is negative. Physical Exam:  Physical Exam  Vitals signs and nursing note reviewed. Constitutional:       Appearance: He is well-developed. He is not diaphoretic. HENT:      Head: Normocephalic. Right Ear: External ear normal.      Left Ear: External ear normal.   Eyes:      General:         Right eye: No discharge. Left eye: No discharge. Neck:      Musculoskeletal: Normal range of motion and neck supple. Cardiovascular:      Rate and Rhythm: Normal rate and regular rhythm. Pulmonary:      Effort: Pulmonary effort is normal. No respiratory distress. Breath sounds: Normal breath sounds. Abdominal:      Tenderness: There is abdominal tenderness. Comments: Abdomen is soft nondistended. Bowel sounds are hypoactive, patient has tenderness in the epigastric and umbilical region with guarding on exam. However, no rebound tenderness at McBurney's point. No acute ascites or rigidity. Musculoskeletal: Normal range of motion. Skin:     General: Skin is warm. Capillary Refill: Capillary refill takes less than 2 seconds. Coloration: Skin is not pale. Neurological:      General: No focal deficit present. Mental Status: He is alert and oriented to person, place, and time. GCS: GCS eye subscore is 4. GCS verbal subscore is 5. GCS motor subscore is 6.    Psychiatric:         Behavior: Behavior normal.         MEDICAL DECISION MAKING    Vitals:    Vitals:    12/28/20 2015 12/28/20 2045 12/28/20 2130 12/28/20 2200   BP: (!) 185/107 (!) 161/113 (!) 160/108 (!) 174/91 Pulse: 104 104 103 98   Resp: 21 8 9 16   Temp:    98.7 °F (37.1 °C)   TempSrc:    Oral   SpO2: 100% 97% 100% 98%   Weight:           LABS:  Labs Reviewed   CBC WITH AUTO DIFFERENTIAL - Abnormal; Notable for the following components:       Result Value    WBC 15.7 (*)     Neutrophils Absolute 13.9 (*)     All other components within normal limits    Narrative:     Performed at:  [address] [facility] [place]   Phone [phone]   BASIC METABOLIC PANEL W/ REFLEX TO MG FOR LOW K - Abnormal; Notable for the following components:    Glucose 129 (*)     All other components within normal limits    Narrative:     [address] [facility] tel. [phone],  Rejected Test Name/Called to: ilan morrison rn, 12/28/2020 18:23, by Ayo Medina  Performed at:  [address] [facility] [place]   Phone [phone]   URINE RT REFLEX TO CULTURE - Abnormal; Notable for the following components:    Color, UA ASHLEY (*)     Ketones, Urine TRACE (*)     Blood, Urine LARGE (*)     Protein, UA 30 (*)     Leukocyte Esterase, Urine TRACE (*)     All other components within normal limits    Narrative:     Performed at:  [address] [facility] [employer] [address]   Phone [phone]   URINE DRUG SCREEN - Abnormal; Notable for the following components:    Cannabinoid Scrn, Ur POSITIVE (*)     All other components within normal limits    Narrative:     Performed at:  [address] [facility] [employer] [address]   Phone [phone]   MICROSCOPIC URINALYSIS - Abnormal; Notable for the following components:    Mucus, UA 4+ (*)     Bacteria, UA 2+ (*)     Yeast, UA Present (*)     RBC, UA 38 (*)     All other components within normal limits    Narrative:     Performed at:  [facility]  [address] [place] [address] [place] [address]   Phone [phone]   RAPID INFLUENZA A/B ANTIGENS    Narrative:     Performed at:  [facility]  [address] [facility] [place] [address]   Phone [phone]   SPECIMEN REJECTION    Narrative:     Brandon Whiting tel. [phone],  Rejected Test Name/Called to: ilan morrison rn, 12/28/2020 18:23, by Amanda Smoke  Performed at:  [facility]  [address] [facility] [place] [address]   Phone [phone]   LACTIC ACID, PLASMA    Narrative:     Performed at:  [facility]  [address] [facility] [place] [address]   Phone [phone]    Narrative:     COVID-23 was cancelled on 12/28/2020 at 18:52 by [facility]; Cancelled: In-house  testing  Performed at:  [facility]  [address] [facility] [place] [address]   Phone [phone]   HEPATIC FUNCTION PANEL        Remainder of labs reviewed and werenegative at this time or not returned at the time of this note. RADIOLOGY:   Non-plain film images such as CT, Ultrasound and MRI are read by the radiologist. Jocelyn CRUZ, APRN - CNP have directly visualized the radiologic plain film image(s) with the below findings:        Interpretation per the Radiologist below, if available at the time of this note:    CT ABDOMEN PELVIS W IV CONTRAST Additional Contrast? None   Final Result   1. No acute process within the abdomen or pelvis. 2. 2 mm nonobstructing calculus within the right kidney lower pole, without   evidence of a ureteral calculus or hydronephrosis. 3. Stable hepatic and bilateral renal cysts.          XR CHEST (2 VW)   Final Result   No active cardiopulmonary disease                 MEDICAL DECISION MAKING / ED COURSE:    Because of high probability of sudden clinical deterioration of the patient's condition and risk of further deterioration, critical care time required my full attention to the patient's condition; which included chart data review, documentation, medication ordering, reviewing the patient's old records, reevaluation patient's cardiac, pulmonary and neurological status. Reevaluation of vital signs. Consultations with ED attending and admitting physician. Ordering, interpreting reviewing diagnostic testing. Therefore a critical care time was 20 minutes of direct attention to the patient's condition did not include time spent on procedures. PROCEDURES:   Procedures    None    Patient was given:  Medications   acetaminophen (TYLENOL) tablet 1,000 mg (1,000 mg Oral Given 12/28/20 1810)   dicyclomine (BENTYL) injection 10 mg (10 mg Intramuscular Given 12/28/20 1817)   iopamidol (ISOVUE-370) 76 % injection 75 mL (75 mLs Intravenous Given 12/28/20 1834)   lactated ringers infusion 1,000 mL (0 mLs Intravenous Stopped 12/28/20 1946)   droperidol (INAPSINE) injection 1.25 mg (1.25 mg Intravenous Given 12/28/20 2149)   cefTRIAXone (ROCEPHIN) 1 g IVPB in 50 mL D5W minibag (0 g Intravenous Stopped 12/28/20 2230)     Patient presents with cough, congestion, body aches, tactile fever and chills, nausea, vomiting and diarrhea with abdominal cramping, patient states that he tested positive for covid 19 about one month ago. However, over the past two days he has become extremely \"sick\". Denies any new medications. Denies any alcohol or drug use. After evaluation and examination the patient IV access, IV fluids, blood work, urinalysis, medications, CT scan of the abdomen and chest x-ray were ordered. CBC shows a leukocytosis with a white count of 15,700, no acute anemia. Metabolic panel shows no acute electrolyte disturbances or renal insufficiency. Rapid flu was negative. Lactic acid 1.7. Urinalysis shows proteinuria and hematuria however when I trend his labs this appears to be a chronic issue for the patient, no nitrates on today's urine. COVID-19 is negative.  Upon reexamination he still having discomfort, he informs me he has not use any alcohol or drugs however he did do a drug screen with patient and he is positive for marijuana, this was after he had the CT scan result, the CT scan shows no acute processes within the abdomen or pelvis. 2 mm nonobstructing calculus in the right kidney without ureteral calculi. Stable hepatic and bilateral renal cysts. 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 He was given fluids and droperidol. Upon reevaluation vital signs are stable, medicine does seem to help with the patient's symptoms. At this time no concerns for acute surgical abdomen, no concerns for appendicitis, pancreatitis, diverticulitis or cholecystitis. Therefore, shared medical decision was made between the patient and myself and we agreed the patient could be discharged home with outpatient follow-up. Patient was discharged home with referral back to PCP, discharged home with Bentyl and Zofran to take as needed, follow-up with PCP in the next 2 to 3 days for reevaluation. Stop using marijuana on a regular basis. The patient tolerated their visit well. I evaluated the patient. The physician was available for consultation as needed. The patient and / or the family were informed of the results of any tests, a time was given to answer questions, a plan was proposed and they agreed with plan. Patient verbalized understanding of discharge instructions and was discharged from the department in stable condition. CLINICAL IMPRESSION:  1. Generalized abdominal pain    2. Marijuana use    3.  Leukocytosis, unspecified type        DISPOSITION        PATIENT REFERRED TO:  Angela Sandoval MD  [address] [address] [phone]    Schedule an appointment as soon as possible for a visit in 3 days  Follow-up with your family doctor in the next 2 to 3 days for reevaluation    [facility] Emergency Department  [address] [facility] [address] [facility]  [phone]  Go to   If symptoms worsen      DISCHARGE MEDICATIONS:  Discharge Medication List as of 12/28/2020 10:27 PM      START taking these medications    Details   dicyclomine (BENTYL) 10 MG capsule Take 1 capsule by mouth every 6 hours as needed (cramps), Disp-20 capsule, R-0Print      ondansetron (ZOFRAN ODT) 4 MG disintegrating tablet Take 1 tablet by mouth every 8 hours as needed for Nausea, Disp-20 tablet, R-0Print             DISCONTINUED MEDICATIONS:  Discharge Medication List as of 12/28/2020 10:27 PM                 (Please note the MDM and HPI sections of this note were completed with a voice recognition program.  Efforts were made to edit the dictations but occasionally words are mis-transcribed.)    Electronically signed, ART Alfredo CNP,          ART Alfredo CNP  12/29/20 5598

## 2020-12-29 ASSESSMENT — ENCOUNTER SYMPTOMS
VOMITING: 1
NAUSEA: 1
ABDOMINAL PAIN: 1
DIARRHEA: 1

## 2021-03-21 ENCOUNTER — HOSPITAL ENCOUNTER (EMERGENCY)
Age: 51
Discharge: HOME | End: 2021-03-21
Payer: COMMERCIAL

## 2021-03-21 VITALS
RESPIRATION RATE: 18 BRPM | TEMPERATURE: 98.6 F | HEART RATE: 84 BPM | DIASTOLIC BLOOD PRESSURE: 113 MMHG | OXYGEN SATURATION: 99 % | SYSTOLIC BLOOD PRESSURE: 167 MMHG

## 2021-03-21 DIAGNOSIS — H10.211 CHEMICAL CONJUNCTIVITIS OF RIGHT EYE: Primary | ICD-10-CM

## 2021-03-21 PROCEDURE — 6360000002 HC RX W HCPCS: Performed by: NURSE PRACTITIONER

## 2021-03-21 PROCEDURE — 90471 IMMUNIZATION ADMIN: CPT | Performed by: NURSE PRACTITIONER

## 2021-03-21 PROCEDURE — 6370000000 HC RX 637 (ALT 250 FOR IP): Performed by: NURSE PRACTITIONER

## 2021-03-21 PROCEDURE — 90715 TDAP VACCINE 7 YRS/> IM: CPT | Performed by: NURSE PRACTITIONER

## 2021-03-21 PROCEDURE — 99283 EMERGENCY DEPT VISIT LOW MDM: CPT

## 2021-03-21 RX ORDER — TETRACAINE HYDROCHLORIDE 5 MG/ML
2 SOLUTION OPHTHALMIC ONCE
Status: COMPLETED | OUTPATIENT
Start: 2021-03-21 | End: 2021-03-21

## 2021-03-21 RX ORDER — POLYMYXIN B SULFATE AND TRIMETHOPRIM 1; 10000 MG/ML; [USP'U]/ML
1 SOLUTION OPHTHALMIC EVERY 4 HOURS
Qty: 1 BOTTLE | Refills: 0 | Status: SHIPPED | OUTPATIENT
Start: 2021-03-21 | End: 2021-03-31

## 2021-03-21 RX ADMIN — TETRACAINE HYDROCHLORIDE 2 DROP: 5 SOLUTION OPHTHALMIC at 09:21

## 2021-03-21 RX ADMIN — TETANUS TOXOID, REDUCED DIPHTHERIA TOXOID AND ACELLULAR PERTUSSIS VACCINE, ADSORBED 0.5 ML: 5; 2.5; 8; 8; 2.5 SUSPENSION INTRAMUSCULAR at 09:21

## 2021-03-21 RX ADMIN — FLUORESCEIN SODIUM 1 MG: 1 STRIP OPHTHALMIC at 09:21

## 2021-03-21 ASSESSMENT — PAIN DESCRIPTION - LOCATION: LOCATION: EYE

## 2021-03-21 ASSESSMENT — PAIN DESCRIPTION - PAIN TYPE: TYPE: ACUTE PAIN

## 2021-03-21 ASSESSMENT — PAIN DESCRIPTION - ORIENTATION: ORIENTATION: RIGHT

## 2021-03-21 ASSESSMENT — PAIN SCALES - GENERAL: PAINLEVEL_OUTOF10: 8

## 2021-03-21 ASSESSMENT — PAIN DESCRIPTION - FREQUENCY: FREQUENCY: CONTINUOUS

## 2021-03-21 NOTE — ED PROVIDER NOTES
[address] [address] [address] [address]  Dept: [phone]  Loc: [address] ENCOUNTER        This patient was not seen or evaluated by the attending physician. I evaluated this patient, the attending physician was available for consultation. CHIEF COMPLAINT    Chief Complaint   Patient presents with    Eye Pain     Had  detergent splash in eye yesterday at work and having increased redness and pain       HPI    Kamaljit Anderson is a 53 y.o. male who presents with right eye pain and irritation and drainage. Onset was yesterday after he got some industrial dish machine detergent splashed in his eye. He states that he did irrigate his eye for approximately 5 minutes at work directly after the incident happened. He states that he then went home, flushed his eye out further. He states that he had some irritation of the right eye yesterday but went to bed. He states that when he woke up he he had conjunctivitis, irritation of the right eye and drainage from the right eye. He states that the drainage is clear/yellow. Denies any visual changes acutely. Does not wear any contact lenses or glasses. The duration has been constant since the onset. Patient states that pain is somewhat alleviated with closing his eyes. Came to the ED for further evaluation and treatment.         REVIEW OF SYSTEMS    Eyes: see HPI  General: No fevers or chills  GI: No nausea or vomiting  Skin: No new rash  Immunization: Tetanus status unknown, will be updated in the ED    PAST MEDICAL and SURGICAL HISTORY    Past Medical History:   Diagnosis Date    Arthritis     Asthma     Bell's palsy 9/5/12    Cerebral artery occlusion with cerebral infarction ([facility] [address]     over 12years; states no physical residual, but poor memory    Chronic pain disorder 10/29/10    Depression 7/13/11    Gout 02/15/2011    toe    Hx of blood clots     Hyperlipidemia     Hypertension 401.9    Long term (current) use of systemic steroids 01/20/2016    Mood swings 3/24/10    Sarcoidosis 3/7/10     Past Surgical History:   Procedure Laterality Date    CYSTOSCOPY N/A 4/8/2019    CYSTOSCOPY performed by Denise Zheng MD at [facility], COLON, DIAGNOSTIC         CURRENT MEDICATIONS  (may include discharge medications prescribed in the ED)  Current Outpatient Rx   Medication Sig Dispense Refill    trimethoprim-polymyxin b (POLYTRIM) 54978-2.1 UNIT/ML-% ophthalmic solution Place 1 drop into the right eye every 4 hours for 10 days 1 Bottle 0    dicyclomine (BENTYL) 10 MG capsule Take 1 capsule by mouth every 6 hours as needed (cramps) 20 capsule 0    ondansetron (ZOFRAN ODT) 4 MG disintegrating tablet Take 1 tablet by mouth every 8 hours as needed for Nausea 20 tablet 0    ibuprofen (ADVIL;MOTRIN) 800 MG tablet Take 1 tablet by mouth every 8 hours as needed for Pain 30 tablet 0    predniSONE (DELTASONE) 20 MG tablet Take 20 mg by mouth daily      Vilazodone HCl 10 & 20 & 40 MG KIT Take 1 each by mouth daily 1 kit 0    allopurinol (ZYLOPRIM) 100 MG tablet Take 1 tablet by mouth daily 30 tablet 3    montelukast (SINGULAIR) 10 MG tablet Take 1 tablet by mouth nightly 30 tablet 5    omeprazole (PRILOSEC) 20 MG delayed release capsule Take 1 capsule by mouth daily 30 capsule 3    amLODIPine (NORVASC) 10 MG tablet Take 1 tablet by mouth daily 30 tablet 5       ALLERGIES    No Known Allergies    SOCIAL and FAMILY HISTORY    Social History     Socioeconomic History    Marital status:      Spouse name: None    Number of children: None    Years of education: None    Highest education level: None   Occupational History    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    Food insecurity     Worry: None     Inability: None    Transportation needs     Medical: None     Non-medical: None Tobacco Use    Smoking status: Never Smoker    Smokeless tobacco: Never Used   Substance and Sexual Activity    Alcohol use: Not Currently     Alcohol/week: 0.0 standard drinks    Drug use: Yes     Types: Marijuana     Comment: marijuana for pain and depression--pt states he smoked  this morning- 4/3/19    Sexual activity: Yes     Partners: Female   Lifestyle    Physical activity     Days per week: None     Minutes per session: None    Stress: None   Relationships    Social connections     Talks on phone: None     Gets together: None     Attends Sikhism service: None     Active member of club or organization: None     Attends meetings of clubs or organizations: None     Relationship status: None    Intimate partner violence     Fear of current or ex partner: None     Emotionally abused: None     Physically abused: None     Forced sexual activity: None   Other Topics Concern    None   Social History Narrative    None     Family History   Problem Relation Age of Onset    Heart Disease Mother     Diabetes Mother     Diabetes Father     Cancer Father        PHYSICAL EXAM    VITAL SIGNS: BP (!) 167/113   Pulse 84   Temp 98.6 °F (37 °C) (Oral)   Resp 18   SpO2 99%   Constitutional:  Well developed, well nourished, no acute distress  Eyes:  Pupils equally round and reactive to light, extraocular movements intact, conjunctiva of the affected eye is injected, no chemosis, no discharge upon arrival noted, no hyphema, no hypopyon, no pain when contralateral eye is exposed to light, no foreign body visualized, globe intact without deformity,   Visual acuity, not corrected: See nursing notes  Slit-lamp examination: no fluorescein uptake, no Ruthy sign, no dendritic or pseudodendritic lesions  HENT:  Atraumatic, external ears normal, nose normal, no periorbital edema or erythema  Neck: no neck swelling   Respiratory:  No retractions  Cardiovascular:  No JVD  Integument:  Warm dry skin, no obvious rash  Neurologic: Awake, alert and oriented, no slurred speech    RADIOLOGY  No orders to display         ED [facility]    See electronic medical records for medications prescribed    Differential diagnosis: Iritis, Uveitis, Conjunctivitis, Herpes Keratitis, Corneal Ulcer, Corneal Abrasion, Acute Angle Glaucoma, Orbital Cellulitis/Abscess, Periorbital/Preseptal Cellulitis, other. Patient is afebrile and nontoxic in appearance. Clinical findings are consistent with chemical conjunctivitis. I have a low suspicion of index for bacterial given that this did happen after chemical insult to the eye. He has been while thoroughly flushed. There is no fluorescein uptake or evidence of any corneal abrasion or ulceration. I will however start him on some Polytrim ophthalmic solution to prevent any bacterial conjunctivitis from developing. He is to follow-up with ophthalmology. He verbalized understanding and has no further questions or concerns and will be discharged home in stable condition. No results found for this visit on 03/21/21. I estimate there is LOW risk for a CORNEAL or LID FOREIGN BODY or ULCERATION, DEEP SPACE INFECTION (e.g., ORBITAL CELLULITIS OR ABSCESS), GLAUCOMA, MENINGITIS, PENETRATING GLOBE INJURY, thus I consider the discharge disposition reasonable. Also, there is no evidence or peritonitis, sepsis, or toxicity. [address] and I have discussed the diagnosis and risks, and we agree with discharging home to follow-up with their primary doctor. We also discussed returning to the Emergency Department immediately if new or worsening symptoms occur. We have discussed the symptoms which are most concerning (e.g., changing or worsening pain, vision changes, neck stiffness or fever) that necessitate immediate return. Discharge Vital Signs:  Blood pressure (!) 167/113, pulse 84, temperature 98.6 °F (37 °C), temperature source Oral, resp. rate 18, SpO2 99 %. The patient was instructed to follow up as an outpatient in 1-2 day. The patient verbalized understanding, they are in agreement with the plan of discharge and have no further questions or concerns. FINAL IMPRESSION    1.  Chemical conjunctivitis of right eye        PLAN  Discharge with medication and followup with ophthalmologist (see EMR)      (Please note that this note was completed with a voice recognition program.  Every attempt was made to edit the dictations, but inevitably there remain words that are mis-transcribed.)        ART Pinto - CNP  03/21/21 8844

## 2021-03-21 NOTE — ED TRIAGE NOTES
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

## 2021-04-25 ENCOUNTER — APPOINTMENT (OUTPATIENT)
Dept: CT IMAGING | Age: 51
End: 2021-04-25
Payer: COMMERCIAL

## 2021-04-25 ENCOUNTER — HOSPITAL ENCOUNTER (EMERGENCY)
Age: 51
Discharge: HOME | End: 2021-04-25
Attending: STUDENT IN AN ORGANIZED HEALTH CARE EDUCATION/TRAINING PROGRAM
Payer: COMMERCIAL

## 2021-04-25 VITALS
DIASTOLIC BLOOD PRESSURE: 100 MMHG | HEART RATE: 90 BPM | SYSTOLIC BLOOD PRESSURE: 157 MMHG | RESPIRATION RATE: 12 BRPM | TEMPERATURE: 98.7 F | OXYGEN SATURATION: 99 %

## 2021-04-25 DIAGNOSIS — R52 BODY ACHES: Primary | ICD-10-CM

## 2021-04-25 DIAGNOSIS — D86.9 SARCOIDOSIS: ICD-10-CM

## 2021-04-25 LAB
A/G RATIO: 1.1 (ref 1.1–2.2)
ALBUMIN SERPL-MCNC: 4.3 G/DL (ref 3.4–5)
ALP BLD-CCNC: 92 U/L (ref 40–129)
ALT SERPL-CCNC: 15 U/L (ref 10–40)
ANION GAP SERPL CALCULATED.3IONS-SCNC: 15 MMOL/L (ref 3–16)
AST SERPL-CCNC: 18 U/L (ref 15–37)
BASOPHILS ABSOLUTE: 0 K/UL (ref 0–0.2)
BASOPHILS RELATIVE PERCENT: 0.6 %
BILIRUB SERPL-MCNC: 0.4 MG/DL (ref 0–1)
BILIRUBIN URINE: NEGATIVE
BLOOD, URINE: ABNORMAL
BUN BLDV-MCNC: 14 MG/DL (ref 7–20)
CALCIUM SERPL-MCNC: 9.6 MG/DL (ref 8.3–10.6)
CHLORIDE BLD-SCNC: 102 MMOL/L (ref 99–110)
CLARITY: CLEAR
CO2: 21 MMOL/L (ref 21–32)
COLOR: YELLOW
CREAT SERPL-MCNC: 0.9 MG/DL (ref 0.9–1.3)
EOSINOPHILS ABSOLUTE: 0.1 K/UL (ref 0–0.6)
EOSINOPHILS RELATIVE PERCENT: 1.1 %
EPITHELIAL CELLS, UA: 0 /HPF (ref 0–5)
GFR AFRICAN AMERICAN: >60
GFR NON-AFRICAN AMERICAN: >60
GLOBULIN: 3.9 G/DL
GLUCOSE BLD-MCNC: 108 MG/DL (ref 70–99)
GLUCOSE URINE: NEGATIVE MG/DL
HCT VFR BLD CALC: 43 % (ref 40.5–52.5)
HEMOGLOBIN: 14.4 G/DL (ref 13.5–17.5)
HYALINE CASTS: 3 /LPF (ref 0–8)
KETONES, URINE: ABNORMAL MG/DL
LACTIC ACID: 1.6 MMOL/L (ref 0.4–2)
LEUKOCYTE ESTERASE, URINE: NEGATIVE
LYMPHOCYTES ABSOLUTE: 1.6 K/UL (ref 1–5.1)
LYMPHOCYTES RELATIVE PERCENT: 24 %
MCH RBC QN AUTO: 30.8 PG (ref 26–34)
MCHC RBC AUTO-ENTMCNC: 33.5 G/DL (ref 31–36)
MCV RBC AUTO: 91.8 FL (ref 80–100)
MICROSCOPIC EXAMINATION: YES
MONOCYTES ABSOLUTE: 0.6 K/UL (ref 0–1.3)
MONOCYTES RELATIVE PERCENT: 9.8 %
NEUTROPHILS ABSOLUTE: 4.2 K/UL (ref 1.7–7.7)
NEUTROPHILS RELATIVE PERCENT: 64.5 %
NITRITE, URINE: NEGATIVE
PDW BLD-RTO: 15.3 % (ref 12.4–15.4)
PH UA: 5.5 (ref 5–8)
PLATELET # BLD: 236 K/UL (ref 135–450)
PMV BLD AUTO: 7.1 FL (ref 5–10.5)
POTASSIUM REFLEX MAGNESIUM: 3.9 MMOL/L (ref 3.5–5.1)
PROCALCITONIN: 0.11 NG/ML (ref 0–0.15)
PROTEIN UA: ABNORMAL MG/DL
RBC # BLD: 4.69 M/UL (ref 4.2–5.9)
RBC UA: 10 /HPF (ref 0–4)
SARS-COV-2, NAAT: NOT DETECTED
SODIUM BLD-SCNC: 138 MMOL/L (ref 136–145)
SPECIFIC GRAVITY UA: >1.03 (ref 1–1.03)
TOTAL PROTEIN: 8.2 G/DL (ref 6.4–8.2)
TROPONIN: <0.01 NG/ML
URINE REFLEX TO CULTURE: ABNORMAL
URINE TYPE: ABNORMAL
UROBILINOGEN, URINE: 0.2 E.U./DL
WBC # BLD: 6.5 K/UL (ref 4–11)
WBC UA: 1 /HPF (ref 0–5)

## 2021-04-25 PROCEDURE — 99283 EMERGENCY DEPT VISIT LOW MDM: CPT

## 2021-04-25 PROCEDURE — 84484 ASSAY OF TROPONIN QUANT: CPT

## 2021-04-25 PROCEDURE — 80053 COMPREHEN METABOLIC PANEL: CPT

## 2021-04-25 PROCEDURE — 87635 SARS-COV-2 COVID-19 AMP PRB: CPT

## 2021-04-25 PROCEDURE — 96374 THER/PROPH/DIAG INJ IV PUSH: CPT

## 2021-04-25 PROCEDURE — 6360000002 HC RX W HCPCS: Performed by: STUDENT IN AN ORGANIZED HEALTH CARE EDUCATION/TRAINING PROGRAM

## 2021-04-25 PROCEDURE — 2580000003 HC RX 258: Performed by: STUDENT IN AN ORGANIZED HEALTH CARE EDUCATION/TRAINING PROGRAM

## 2021-04-25 PROCEDURE — 84145 PROCALCITONIN (PCT): CPT

## 2021-04-25 PROCEDURE — 85025 COMPLETE CBC W/AUTO DIFF WBC: CPT

## 2021-04-25 PROCEDURE — 87040 BLOOD CULTURE FOR BACTERIA: CPT

## 2021-04-25 PROCEDURE — 6360000004 HC RX CONTRAST MEDICATION: Performed by: STUDENT IN AN ORGANIZED HEALTH CARE EDUCATION/TRAINING PROGRAM

## 2021-04-25 PROCEDURE — 74177 CT ABD & PELVIS W/CONTRAST: CPT

## 2021-04-25 PROCEDURE — 83605 ASSAY OF LACTIC ACID: CPT

## 2021-04-25 PROCEDURE — 36415 COLL VENOUS BLD VENIPUNCTURE: CPT

## 2021-04-25 PROCEDURE — 81001 URINALYSIS AUTO W/SCOPE: CPT

## 2021-04-25 PROCEDURE — 71260 CT THORAX DX C+: CPT

## 2021-04-25 RX ORDER — KETOROLAC TROMETHAMINE 30 MG/ML
15 INJECTION, SOLUTION INTRAMUSCULAR; INTRAVENOUS ONCE
Status: COMPLETED | OUTPATIENT
Start: 2021-04-25 | End: 2021-04-25

## 2021-04-25 RX ORDER — 0.9 % SODIUM CHLORIDE 0.9 %
1000 INTRAVENOUS SOLUTION INTRAVENOUS ONCE
Status: COMPLETED | OUTPATIENT
Start: 2021-04-25 | End: 2021-04-25

## 2021-04-25 RX ADMIN — SODIUM CHLORIDE 1000 ML: 9 INJECTION, SOLUTION INTRAVENOUS at 16:17

## 2021-04-25 RX ADMIN — IOPAMIDOL 75 ML: 755 INJECTION, SOLUTION INTRAVENOUS at 16:58

## 2021-04-25 RX ADMIN — KETOROLAC TROMETHAMINE 15 MG: 30 INJECTION, SOLUTION INTRAMUSCULAR at 16:17

## 2021-04-25 ASSESSMENT — PAIN SCALES - GENERAL
PAINLEVEL_OUTOF10: 4
PAINLEVEL_OUTOF10: 8

## 2021-04-25 ASSESSMENT — PAIN DESCRIPTION - LOCATION: LOCATION: HEAD

## 2021-04-25 ASSESSMENT — PAIN DESCRIPTION - DESCRIPTORS: DESCRIPTORS: ACHING

## 2021-04-25 NOTE — ED PROVIDER NOTES
Primary Care Physician: Pierre Duffy MD   Attending Physician: No att. providers found     History   Chief Complaint   Patient presents with    Generalized Body Aches     patient states that on Friday night he started having full body aches, headache, coughing, emesis, hot and cold chills. JIMBO Kinney is a 53 y.o. male history of chronic pain syndrome, CVA, hypertension, hyperlipidemia, sarcoidosis presenting complaining of generalized body pain that started a few days before he presented. He is also complaining of some headaches, cough, emesis hot and chills. Denies any fevers. He states that he has been tested for COVID-19 and is all been negative. No abdominal pain or diarrhea. No URI symptoms.      Past Medical History:   Diagnosis Date    Arthritis     Asthma     Bell's palsy 9/5/12    Cerebral artery occlusion with cerebral infarction ([facility] [address]     over 12years; states no physical residual, but poor memory    Chronic pain disorder 10/29/10    Depression 7/13/11    Gout 02/15/2011    toe    Hx of blood clots     Hyperlipidemia     Hypertension 401.9    Long term (current) use of systemic steroids 01/20/2016    Mood swings 3/24/10    Sarcoidosis 3/7/10        Past Surgical History:   Procedure Laterality Date    CYSTOSCOPY N/A 4/8/2019    CYSTOSCOPY performed by Gabe Louise MD at [address], COLON, DIAGNOSTIC          Family History   Problem Relation Age of Onset    Heart Disease Mother     Diabetes Mother     Diabetes Father     Cancer Father         Social History     Socioeconomic History    Marital status:      Spouse name: None    Number of children: None    Years of education: None    Highest education level: None   Occupational History    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    Food insecurity     Worry: None     Inability: None    Transportation needs     Medical: None Non-medical: None   Tobacco Use    Smoking status: Never Smoker    Smokeless tobacco: Never Used   Substance and Sexual Activity    Alcohol use: Not Currently     Alcohol/week: 0.0 standard drinks    Drug use: Yes     Types: Marijuana     Comment: marijuana for pain and depression--pt states he smoked  this morning- 4/3/19    Sexual activity: Yes     Partners: Female   Lifestyle    Physical activity     Days per week: None     Minutes per session: None    Stress: None   Relationships    Social connections     Talks on phone: None     Gets together: None     Attends Muslim service: None     Active member of club or organization: None     Attends meetings of clubs or organizations: None     Relationship status: None    Intimate partner violence     Fear of current or ex partner: None     Emotionally abused: None     Physically abused: None     Forced sexual activity: None   Other Topics Concern    None   Social History Narrative    None        Review of Systems   10 total systems reviewed and found to be negative unless otherwise noted in HPI     Physical Exam   BP (!) 157/100   Pulse 90   Temp 98.7 °F (37.1 °C) (Temporal)   Resp 12   SpO2 99%      CONSTITUTIONAL: ill  appearing, in no acute distress   HEAD: atraumatic, normocephalic   EYES: PERRL, No injection, discharge or scleral icterus. ENT: Moist mucous membranes. NECK: Normal ROM, NO LAD   CARDIOVASCULAR: Regular rate and rhythm. No murmurs or gallop. PULMONARY/CHEST: Airway patent. No retractions. Breath sounds clear with good air entry bilaterally. ABDOMEN: Soft, Non-distended and non-tender, without guarding or rebound. SKIN: Acyanotic, warm, dry   MUSCULOSKELETAL: No swelling, tenderness or deformity   NEUROLOGICAL: Awake and oriented x 3. Pulses intact. Grossly nonfocal   Nursing note and vitals reviewed.      ED Course & Medical Decision Making   Medications   0.9 % sodium chloride bolus (0 mLs Intravenous Stopped 4/25/21 7293) ketorolac (TORADOL) injection 15 mg (15 mg Intravenous Given 4/25/21 1617)   iopamidol (ISOVUE-370) 76 % injection 75 mL (75 mLs Intravenous Given 4/25/21 1658)      Labs Reviewed   COMPREHENSIVE METABOLIC PANEL W/ REFLEX TO MG FOR LOW K - Abnormal; Notable for the following components:       Result Value    Glucose 108 (*)     All other components within normal limits    Narrative:     Performed at:  [address] [facility] [address]   Phone [phone]   URINE RT REFLEX TO CULTURE - Abnormal; Notable for the following components:    Ketones, Urine TRACE (*)     Blood, Urine LARGE (*)     Protein, UA TRACE (*)     All other components within normal limits    Narrative:     Performed at:  [address] [facility] [address]   Phone [phone]   MICROSCOPIC URINALYSIS - Abnormal; Notable for the following components:    RBC, UA 10 (*)     All other components within normal limits    Narrative:     Performed at:  [address] [address]   Phone [phone] [phone]    Narrative:     Performed at:  [facility]  [address] [place] [address]   Phone [phone]   CULTURE, BLOOD 1    Narrative:     ORDER#: [id number]                          ORDERED BY: Jonna Correa  SOURCE: Blood                              COLLECTED:  04/25/21 16:10  ANTIBIOTICS AT JERRY.:                      RECEIVED :  04/25/21 16:15  If child <=2 yrs old please draw pediatric bottle. ~Blood Culture 1  Performed at:  [address] [facility] [address]   Phone [phone]   CULTURE, BLOOD 2    Narrative:     ORDER#: [id number]                          ORDERED BY: Jonna Correa  SOURCE: Blood                              COLLECTED:  04/25/21 17:52  ANTIBIOTICS AT JERRY.:                      RECEIVED :  04/25/21 17:52  If child <=2 yrs old please draw pediatric bottle. ~Blood Culture #2  Performed at:  [facility]  [address] [place] [address]   Phone [phone]   CBC WITH AUTO DIFFERENTIAL    Narrative:     Performed at:  [facility]  [address] [facility] [address]   Phone [phone]   LACTIC ACID, PLASMA    Narrative:     Performed at:  [facility]  [address] [place] [address]   Phone [phone]   TROPONIN    Narrative:     Performed at:  [facility]  [address] [place] [address]   Phone [phone]   PROCALCITONIN    Narrative:     Performed at:  [facility]  [address] [place] [address]   Phone [phone]      CT CHEST PULMONARY EMBOLISM W CONTRAST   Final Result   1. Suboptimal opacification of the pulmonary arteries. No acute pulmonary   embolism to the lobar arteries given limitation. 2. Multifocal ground-glass airspace disease. Correlate with presentation for   pneumonia. Follow-up to resolution recommended. 3. Trace pericardial effusion. 4. Other findings as described. CT ABDOMEN PELVIS W IV CONTRAST Additional Contrast? None   Final Result   1. No appendicitis, diverticulitis, or small bowel obstruction. 2. Punctate nonobstructive right renal calculi. 3. Other findings as described. Ct Abdomen Pelvis W Iv Contrast Additional Contrast? None    Result Date: 4/25/2021  EXAMINATION: CT OF THE ABDOMEN AND PELVIS WITH CONTRAST 4/25/2021 4:56 pm TECHNIQUE: CT of the abdomen and pelvis was performed with the administration of intravenous contrast. Multiplanar reformatted images are provided for review.  Dose modulation, iterative reconstruction, and/or weight based adjustment of the mA/kV was utilized to reduce the radiation dose to as low as reasonably achievable. COMPARISON: None. HISTORY: ORDERING SYSTEM PROVIDED HISTORY: abd pain TECHNOLOGIST PROVIDED HISTORY: Additional Contrast?->None Reason for exam:->abd pain Decision Support Exception->Emergency Medical Condition (MA) Reason for Exam: abd pain Acuity: Acute Type of Exam: Initial FINDINGS: Lower Chest: For findings above the diaphragm, refer to same-day CTA chest. Organs: Liver: Too small to characterize low attenuation foci. Spleen: Unremarkable on this phase of enhancement. Pancreas: Unremarkable on this phase of enhancement. Adrenal glands: Unremarkable on this phase of enhancement. Kidneys: Too small to characterize low attenuation foci. Punctate nonobstructive left renal calculi. No right ureteral calculus. No left renal or ureteral calculus. Gallbladder: Incompletely distended. GI/Bowel: Evaluation of the bowel and mesentery is limited due to lack of enteric contrast. Visualized esophagus/stomach: Unremarkable given lack of enteric contrast and degree of distension. Small bowel: No dilated small bowel. Large bowel: Scattered colonic diverticula without adjacent stranding. Appendix: Normal. Pelvis: Bladder is incompletely distended. Calcifications in the prostate. Prostate and seminal vesicles appear unremarkable. Peritoneum/Retroperitoneum: Adenopathy: Suboptimal evaluation for adenopathy due to lack of enteric contrast. Abdominal aorta: No abdominal aortic aneurysm. Free fluid/air: No free fluid. No free air. Bones/Soft Tissues: Scattered degenerative changes noted in the visualized spine without spondylolisthesis. 1. No appendicitis, diverticulitis, or small bowel obstruction. 2. Punctate nonobstructive right renal calculi. 3. Other findings as described.      Ct Chest Pulmonary Embolism W Contrast    Result Date: 4/25/2021  EXAMINATION: CTA OF THE CHEST 4/25/2021 4:56 pm TECHNIQUE: CTA of the chest was performed after the administration of intravenous contrast.  Multiplanar reformatted images are provided for review. MIP images are provided for review. Dose modulation, iterative reconstruction, and/or weight based adjustment of the mA/kV was utilized to reduce the radiation dose to as low as reasonably achievable. COMPARISON: None. HISTORY: ORDERING SYSTEM PROVIDED HISTORY: h/o sacoid with cough and pain TECHNOLOGIST PROVIDED HISTORY: Reason for exam:->h/o sacoid with cough and pain Decision Support Exception->Emergency Medical Condition (MA) Reason for Exam: h/o sacoid with cough and pain Acuity: Acute Type of Exam: Initial FINDINGS: Pulmonary Arteries: Pulmonary arteries are within normal limits in size. . Suboptimal opacification of the pulmonary arteries. No filling defect is identified in the pulmonary arteries to the level of thelobar arteries. Mediastinum: Heart is within normal limits in size. Trace pericardial effusion. Aorta is within normal limits in size. No luminal defect is appreciated in the visualized thoracic aorta. Lungs/pleura: Central airways are patent. Mild to moderate peribronchial thickening. Scattered subsegmental ground-glass opacities atelectasis. No pleural effusion. No pneumothorax. Soft Tissues/Bones: No adenopathy. Prominent lymph nodes in the mediastinum and chely may be reactive. Surgical clip noted in the mediastinum. Scattered degenerative changes noted in the visualized spine without spondylolisthesis. Upper Abdomen: For findings below the diaphragm, refer to same-day CT abdomen pelvis. 1.  Suboptimal opacification of the pulmonary arteries. No acute pulmonary embolism to the lobar arteries given limitation. 2. Multifocal ground-glass airspace disease. Correlate with presentation for pneumonia. Follow-up to resolution recommended. 3. Trace pericardial effusion. 4. Other findings as described.       PROCEDURES:   Procedures    ASSESSMENT AND PLAN:  Ciarra Mohr Sr. is a 53 y.o. male presenting with flulike symptoms. On exam he appeared to be in no distress, vitals within normal limits. Nonetheless because he looked ill I did obtain labs to evaluate for any source of infection including a CT of the abdomen and pelvic as well as chest which were all unremarkable. At this point I believe that the cause of his symptoms might be probably secondary to his sarcoid. He is stable no indication for admission/discharge home with recommendation to follow-up with primary care doctor. I also did send a referral to the pulmonologist given his history of sarcoidosis. ClINICAL IMPRESSION:  1. Body aches    2. Sarcoidosis          PATIENT REFERRED TO:  Jayson Ellis MD  [address] [address]  [phone]    Schedule an appointment as soon as possible for a visit in 2 days      Claudean Shingles[address] [id number]  [phone]    Schedule an appointment as soon as possible for a visit in 2 days        DISCHARGE MEDICATIONS:  Discharge Medication List as of 4/25/2021  7:30 PM        DISCONTINUED MEDICATIONS:  Discharge Medication List as of 4/25/2021  7:30 PM        DISPOSITION Decision To Discharge 04/25/2021 06:55:58 PM  -We have instructed the patient, (Blas Regalado.) to return to the ED or call him PCP if his pain/symptoms worsen. -Findings and recommendations explained to patient,. He expressed understanding and agreed with the plan. Shad Carter MD (electronically signed)  4/27/2021  _________________________________________________________________________________________  _________________________________________________________________________________________  This record is transcribed utilizing voice recognition technology. There are inherent limitations in this technology. In addition, there may be limitations in editing of this report. If there are any discrepancies, please contact me directly.         Vesna MARTINS MD Karmen  04/27/21 5998

## 2021-04-29 LAB
BLOOD CULTURE, ROUTINE: NORMAL
CULTURE, BLOOD 2: NORMAL

## 2021-04-30 ENCOUNTER — OFFICE VISIT (OUTPATIENT)
Dept: PULMONOLOGY | Age: 51
End: 2021-04-30
Payer: COMMERCIAL

## 2021-04-30 VITALS
BODY MASS INDEX: 28.04 KG/M2 | WEIGHT: 207 LBS | RESPIRATION RATE: 16 BRPM | HEIGHT: 72 IN | SYSTOLIC BLOOD PRESSURE: 128 MMHG | DIASTOLIC BLOOD PRESSURE: 82 MMHG | OXYGEN SATURATION: 97 % | HEART RATE: 86 BPM | TEMPERATURE: 97 F

## 2021-04-30 DIAGNOSIS — J18.9 COMMUNITY ACQUIRED PNEUMONIA OF RIGHT UPPER LOBE OF LUNG: ICD-10-CM

## 2021-04-30 DIAGNOSIS — D86.9 SARCOIDOSIS: Primary | ICD-10-CM

## 2021-04-30 DIAGNOSIS — Z79.52 LONG TERM (CURRENT) USE OF SYSTEMIC STEROIDS: ICD-10-CM

## 2021-04-30 PROCEDURE — G8419 CALC BMI OUT NRM PARAM NOF/U: HCPCS | Performed by: INTERNAL MEDICINE

## 2021-04-30 PROCEDURE — G8427 DOCREV CUR MEDS BY ELIG CLIN: HCPCS | Performed by: INTERNAL MEDICINE

## 2021-04-30 PROCEDURE — 1036F TOBACCO NON-USER: CPT | Performed by: INTERNAL MEDICINE

## 2021-04-30 PROCEDURE — 99204 OFFICE O/P NEW MOD 45 MIN: CPT | Performed by: INTERNAL MEDICINE

## 2021-04-30 RX ORDER — LEVOFLOXACIN 750 MG/1
750 TABLET ORAL DAILY
Qty: 7 TABLET | Refills: 0 | Status: SHIPPED | OUTPATIENT
Start: 2021-04-30 | End: 2021-05-07

## 2021-04-30 RX ORDER — PREDNISONE 1 MG/1
4 TABLET ORAL DAILY
Qty: 120 TABLET | Refills: 0 | Status: SHIPPED | OUTPATIENT
Start: 2021-04-30 | End: 2021-05-30

## 2021-04-30 NOTE — PROGRESS NOTES
 Bell's palsy 9/5/12    Cerebral artery occlusion with cerebral infarction ([facility] [address]     over 12years; states no physical residual, but poor memory    Chronic pain disorder 10/29/10    Depression 7/13/11    Gout 02/15/2011    toe    Hx of blood clots     Hyperlipidemia     Hypertension 401.9    Long term (current) use of systemic steroids 01/20/2016    Mood swings 3/24/10    Sarcoidosis 3/7/10       PAST SURGICAL HISTORY:  Past Surgical History:   Procedure Laterality Date    CYSTOSCOPY N/A 4/8/2019    CYSTOSCOPY performed by Oliver Sheikh MD at [address] [facility], COLON, DIAGNOSTIC         FAMILY HISTORY:  family history includes Cancer in his father; Diabetes in his father and mother; Heart Disease in his mother. Objective:   PHYSICAL EXAM:  Blood pressure 128/82, pulse 86, temperature 97 °F (36.1 °C), temperature source Infrared, resp. rate 16, height 6' (1.829 m), weight 207 lb (93.9 kg), SpO2 97 %.'  Gen: No distress. Eyes: PERRL. No sclera icterus. No conjunctival injection. ENT: No discharge. Pharynx clear. External appearance of ears and nose normal.  Neck: Trachea midline. No obvious mass. Resp: No accessory muscle use. No crackles. ++ wheezes. + rhonchi. CV: Regular rate. Regular rhythm. No murmur or rub. No edema. GI:    Skin: Warm, dry, normal texture and turgor. No nodule on exposed extremities. Lymph: No cervical LAD. No supraclavicular LAD. M/S: No cyanosis. No clubbing. No joint deformity. Neuro: Moves all four extremities. Psych: Oriented x 3. No anxiety. Awake. Alert. Intact judgement and insight.     Current Outpatient Medications   Medication Sig Dispense Refill    levoFLOXacin (LEVAQUIN) 750 MG tablet Take 1 tablet by mouth daily for 7 days 7 tablet 0    predniSONE (DELTASONE) 1 MG tablet Take 4 tablets by mouth daily 120 tablet 0    dicyclomine (BENTYL) 10 MG capsule Take 1 capsule by mouth every 6 hours as needed (cramps) 20 capsule 0    ondansetron (ZOFRAN ODT) 4 MG disintegrating tablet Take 1 tablet by mouth every 8 hours as needed for Nausea 20 tablet 0    predniSONE (DELTASONE) 20 MG tablet Take 20 mg by mouth daily      allopurinol (ZYLOPRIM) 100 MG tablet Take 1 tablet by mouth daily 30 tablet 3    omeprazole (PRILOSEC) 20 MG delayed release capsule Take 1 capsule by mouth daily 30 capsule 3    amLODIPine (NORVASC) 10 MG tablet Take 1 tablet by mouth daily 30 tablet 5    ibuprofen (ADVIL;MOTRIN) 800 MG tablet Take 1 tablet by mouth every 8 hours as needed for Pain (Patient not taking: Reported on 4/30/2021) 30 tablet 0    Vilazodone HCl 10 & 20 & 40 MG KIT Take 1 each by mouth daily 1 kit 0    montelukast (SINGULAIR) 10 MG tablet Take 1 tablet by mouth nightly (Patient not taking: Reported on 4/30/2021) 30 tablet 5     No current facility-administered medications for this visit. Data Reviewed:     Category 1 Data points:      Last CBC  Lab Results   Component Value Date    WBC 6.5 04/25/2021    RBC 4.69 04/25/2021    HGB 14.4 04/25/2021    MCV 91.8 04/25/2021     04/25/2021     Last Renal  Lab Results   Component Value Date     04/25/2021    K 3.9 04/25/2021     04/25/2021    CO2 21 04/25/2021    CO2 21 12/28/2020    CO2 26 12/20/2019    BUN 14 04/25/2021    CREATININE 0.9 04/25/2021    GLUCOSE 108 04/25/2021    CALCIUM 9.6 04/25/2021       Last ABG  POC Blood Gas: No results found for: POCPH, POCPCO2, POCPO2, POCHCO3, NBEA, [id number]  No results for input(s): PH, PCO2, PO2, HCO3, BE, O2SAT in the last 72 hours. Notes Reviewed:       Radiology Review:  Pertinent images / reports were reviewed as a part of this visit. CT Chest w/ contrast: No results found for this or any previous visit. CT Chest w/o contrast: No results found for this or any previous visit.     CTPA:   Results for orders placed during the hospital encounter of 04/25/21   CT CHEST PULMONARY EMBOLISM W CONTRAST    Narrative EXAMINATION:  CTA OF THE CHEST 4/25/2021 4:56 pm    TECHNIQUE:  CTA of the chest was performed after the administration of intravenous  contrast.  Multiplanar reformatted images are provided for review. MIP  images are provided for review. Dose modulation, iterative reconstruction,  and/or weight based adjustment of the mA/kV was utilized to reduce the  radiation dose to as low as reasonably achievable. COMPARISON:  None. HISTORY:  ORDERING SYSTEM PROVIDED HISTORY: h/o sacoid with cough and pain  TECHNOLOGIST PROVIDED HISTORY:  Reason for exam:->h/o sacoid with cough and pain  Decision Support Exception->Emergency Medical Condition (MA)  Reason for Exam: h/o sacoid with cough and pain  Acuity: Acute  Type of Exam: Initial    FINDINGS:  Pulmonary Arteries: Pulmonary arteries are within normal limits in size. .  Suboptimal opacification of the pulmonary arteries. No filling defect is  identified in the pulmonary arteries to the level of thelobar arteries. Mediastinum: Heart is within normal limits in size. Trace pericardial  effusion. Aorta is within normal limits in size. No luminal defect is  appreciated in the visualized thoracic aorta. Lungs/pleura: Central airways are patent. Mild to moderate peribronchial  thickening. Scattered subsegmental ground-glass opacities atelectasis. No  pleural effusion. No pneumothorax. Soft Tissues/Bones: No adenopathy. Prominent lymph nodes in the mediastinum  and chely may be reactive. Surgical clip noted in the mediastinum. Scattered  degenerative changes noted in the visualized spine without spondylolisthesis. Upper Abdomen: For findings below the diaphragm, refer to same-day CT abdomen  pelvis. Impression 1. Suboptimal opacification of the pulmonary arteries. No acute pulmonary  embolism to the lobar arteries given limitation. 2. Multifocal ground-glass airspace disease. Correlate with presentation for  pneumonia.   Follow-up to resolution recommended. 3. Trace pericardial effusion. 4. Other findings as described. CXR PA/LAT:   Results for orders placed during the hospital encounter of 12/28/20   XR CHEST (2 VW)    Narrative EXAMINATION:  TWO XRAY VIEWS OF THE CHEST    12/28/2020 5:03 pm    COMPARISON:  07/26/2018    HISTORY:  ORDERING SYSTEM PROVIDED HISTORY: Shortness of breath  TECHNOLOGIST PROVIDED HISTORY:  Reason for exam:->Shortness of breath  Reason for Exam: Shortness of breath  nausea vomiting  Acuity: Acute  Type of Exam: Initial    FINDINGS:  Heart size and pulmonary vasculature within normal limits. Lungs clear. Costophrenic angles sharp. Metal clip in the region of the azygos, present  previously      Impression No active cardiopulmonary disease         CXR portable: No results found for this or any previous visit. Total labs reviewed  3+    Category 2 Data points:    Radiology Review:  Independent interpretation of groundglass opacities in right. No signs of mediastinal adenopathy record sarcoidosis based on CT chest.  Mild bronchiectasis noted. Category 3 Data points:    Discussed management or interpretation of test with external provider:              Assessment:     Sarcoidosis as proven by mediastinoscopy 2008, notes and biopsies not available  community-acquired pneumonia     Plan:      Problem List Items Addressed This Visit     Sarcoidosis - Primary     Patient was diagnosed with sarcoidosis in approximately 2008 via mediastinoscopy. All history per the patient's recollection as none of these records are available. He was initially treated with prednisone and this was weaned down to 5 mg a day. He has been on 5 mg a day for the last 15 or so years. He stopped abruptly for the course of approximately 3 weeks with significant symptoms of fatigue, feelings of depression, and generally just feeling terrible until restarting prednisone.     Recent CT chest demonstrates groundglass opacity consistent with This note was transcribed using [id number] [employer]. Please disregard any translational errors.     Audrey Puckett [facility] [phone]

## 2021-04-30 NOTE — PATIENT INSTRUCTIONS
EKG.   Eye exam  Prednisone 4 mg daily x 30 days, then wean to 3 mg x 30 days. And will continues to wean,.      Call for refill and call if any sign of adrenal insuffiencey (symptoms you had prior when stopping it)      ONLY FILL LEVAQUIN IF THE DOXY DOES NOT RESOLVE YOUR symptoms

## 2021-04-30 NOTE — LETTER
[facility]   [address] [facility] [facility]  Phone: [phone]  Fax: [phone]     4/30/2021    Dr. Pierre Duffy MD    Today had the pleasure to see our mutual patient, Johnny Kinney .  My office note is attached. Please let me know if you have any questions.         Sincerely,    Audrey Puckett [facility]  [phone]

## 2021-04-30 NOTE — ASSESSMENT & PLAN NOTE
Patient was diagnosed with sarcoidosis in approximately 2008 via mediastinoscopy. All history per the patient's recollection as none of these records are available. He was initially treated with prednisone and this was weaned down to 5 mg a day. He has been on 5 mg a day for the last 15 or so years. He stopped abruptly for the course of approximately 3 weeks with significant symptoms of fatigue, feelings of depression, and generally just feeling terrible until restarting prednisone. Recent CT chest demonstrates groundglass opacity consistent with pneumonia. He was started on antibiotic from his primary care, Dr. BURROUGHS [facility], which he believes is doxycycline. 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 Therefore it is technically a healthcare associated pneumonia but, given the lack of patient contact, treating as community acquired pneumonia is likely correct. He is on day 2 of antibiotics currently. He is to complete doxycycline. If he does not improve, antibiotic of Levaquin was given to only take if he does not improve. He expressed understanding for this. Assessing CT chest in 2010 and 2021, no mediastinal adenopathy is able to be detected. Therefore, if ignoring the groundglass opacity, it is otherwise a relatively normal CT chest.  Therefore, no signs of active sarcoidosis at this time. It is not clear that the patient needs chronic prednisone. After his acute illness resolves, the plan is to wean off of prednisone. Given his symptoms of adrenal insufficiency after stopping prednisone, the plan will be to wean off of prednisone over the course of 4 months decreasing 1 mg/day/month. Prescription for 4 mg given and he is to call 1 week prior to her running out of this medication to receive the 3 mg dose. He and his wife expressed understanding for this. In addition, EKG ordered to assess for cardiac sarcoid.   Last EKG 2018 was normal    Lastly, he is to follow-up with ophthalmology to assess whether his eye sensitivity is secondary to sarcoidosis or some other eye related problem. All questions answered.

## 2022-05-17 VITALS
RESPIRATION RATE: 18 BRPM | TEMPERATURE: 98.7 F | DIASTOLIC BLOOD PRESSURE: 91 MMHG | SYSTOLIC BLOOD PRESSURE: 175 MMHG | OXYGEN SATURATION: 100 % | HEART RATE: 73 BPM

## 2022-05-17 LAB
BACTERIA: NORMAL /HPF
BILIRUBIN URINE: NEGATIVE
BLOOD, URINE: ABNORMAL
CLARITY: CLEAR
COLOR: YELLOW
EPITHELIAL CELLS, UA: 0 /HPF (ref 0–5)
GLUCOSE URINE: NEGATIVE MG/DL
HYALINE CASTS: 0 /LPF (ref 0–8)
KETONES, URINE: NEGATIVE MG/DL
LEUKOCYTE ESTERASE, URINE: NEGATIVE
MICROSCOPIC EXAMINATION: YES
NITRITE, URINE: NEGATIVE
PH UA: 6 (ref 5–8)
PROTEIN UA: NEGATIVE MG/DL
RBC UA: 3 /HPF (ref 0–4)
SPECIFIC GRAVITY UA: 1.01 (ref 1–1.03)
URINE REFLEX TO CULTURE: ABNORMAL
URINE TYPE: ABNORMAL
UROBILINOGEN, URINE: 0.2 E.U./DL
WBC UA: 0 /HPF (ref 0–5)

## 2022-05-17 PROCEDURE — 99283 EMERGENCY DEPT VISIT LOW MDM: CPT

## 2022-05-17 PROCEDURE — 81001 URINALYSIS AUTO W/SCOPE: CPT

## 2022-05-17 ASSESSMENT — PAIN DESCRIPTION - LOCATION: LOCATION: BACK;HEAD

## 2022-05-17 ASSESSMENT — PAIN DESCRIPTION - FREQUENCY: FREQUENCY: CONTINUOUS

## 2022-05-17 ASSESSMENT — PAIN SCALES - GENERAL: PAINLEVEL_OUTOF10: 8

## 2022-05-17 ASSESSMENT — PAIN - FUNCTIONAL ASSESSMENT: PAIN_FUNCTIONAL_ASSESSMENT: 0-10

## 2022-05-17 ASSESSMENT — PAIN DESCRIPTION - PAIN TYPE: TYPE: ACUTE PAIN

## 2022-05-18 ENCOUNTER — HOSPITAL ENCOUNTER (EMERGENCY)
Age: 52
Discharge: LEFT BEFORE DISCHARGE | End: 2022-05-18
Payer: MEDICARE

## 2022-05-18 ENCOUNTER — HOSPITAL ENCOUNTER (EMERGENCY)
Age: 52
Discharge: HOME | End: 2022-05-18
Payer: MEDICARE

## 2022-05-18 VITALS
TEMPERATURE: 98.1 F | RESPIRATION RATE: 14 BRPM | SYSTOLIC BLOOD PRESSURE: 144 MMHG | DIASTOLIC BLOOD PRESSURE: 93 MMHG | OXYGEN SATURATION: 100 % | HEIGHT: 72 IN | BODY MASS INDEX: 28.07 KG/M2 | HEART RATE: 62 BPM

## 2022-05-18 DIAGNOSIS — M54.50 ACUTE BILATERAL LOW BACK PAIN WITHOUT SCIATICA: Primary | ICD-10-CM

## 2022-05-18 DIAGNOSIS — R11.0 NAUSEA: ICD-10-CM

## 2022-05-18 DIAGNOSIS — R51.9 ACUTE NONINTRACTABLE HEADACHE, UNSPECIFIED HEADACHE TYPE: ICD-10-CM

## 2022-05-18 LAB
A/G RATIO: 1.2 (ref 1.1–2.2)
ALBUMIN SERPL-MCNC: 4.1 G/DL (ref 3.4–5)
ALP BLD-CCNC: 99 U/L (ref 40–129)
ALT SERPL-CCNC: 12 U/L (ref 10–40)
ANION GAP SERPL CALCULATED.3IONS-SCNC: 11 MMOL/L (ref 3–16)
AST SERPL-CCNC: 15 U/L (ref 15–37)
BACTERIA: ABNORMAL /HPF
BASOPHILS ABSOLUTE: 0.1 K/UL (ref 0–0.2)
BASOPHILS RELATIVE PERCENT: 1 %
BILIRUB SERPL-MCNC: 0.3 MG/DL (ref 0–1)
BILIRUBIN URINE: NEGATIVE
BLOOD, URINE: ABNORMAL
BUN BLDV-MCNC: 8 MG/DL (ref 7–20)
CALCIUM SERPL-MCNC: 9.4 MG/DL (ref 8.3–10.6)
CHLORIDE BLD-SCNC: 103 MMOL/L (ref 99–110)
CLARITY: CLEAR
CO2: 24 MMOL/L (ref 21–32)
COLOR: YELLOW
COMMENT UA: ABNORMAL
CREAT SERPL-MCNC: 0.7 MG/DL (ref 0.9–1.3)
EOSINOPHILS ABSOLUTE: 0.2 K/UL (ref 0–0.6)
EOSINOPHILS RELATIVE PERCENT: 3 %
EPITHELIAL CELLS, UA: 0 /HPF (ref 0–5)
GFR AFRICAN AMERICAN: >60
GFR NON-AFRICAN AMERICAN: >60
GLUCOSE BLD-MCNC: 106 MG/DL (ref 70–99)
GLUCOSE URINE: NEGATIVE MG/DL
HCT VFR BLD CALC: 41.1 % (ref 40.5–52.5)
HEMOGLOBIN: 13.7 G/DL (ref 13.5–17.5)
HYALINE CASTS: 0 /LPF (ref 0–8)
KETONES, URINE: NEGATIVE MG/DL
LEUKOCYTE ESTERASE, URINE: NEGATIVE
LIPASE: 19 U/L (ref 13–60)
LYMPHOCYTES ABSOLUTE: 2 K/UL (ref 1–5.1)
LYMPHOCYTES RELATIVE PERCENT: 31.8 %
MCH RBC QN AUTO: 30.1 PG (ref 26–34)
MCHC RBC AUTO-ENTMCNC: 33.3 G/DL (ref 31–36)
MCV RBC AUTO: 90.4 FL (ref 80–100)
MICROSCOPIC EXAMINATION: YES
MONOCYTES ABSOLUTE: 0.4 K/UL (ref 0–1.3)
MONOCYTES RELATIVE PERCENT: 6.8 %
MUCUS: ABNORMAL /LPF
NEUTROPHILS ABSOLUTE: 3.7 K/UL (ref 1.7–7.7)
NEUTROPHILS RELATIVE PERCENT: 57.4 %
NITRITE, URINE: NEGATIVE
PDW BLD-RTO: 15.1 % (ref 12.4–15.4)
PH UA: 6.5 (ref 5–8)
PLATELET # BLD: 263 K/UL (ref 135–450)
PMV BLD AUTO: 6.9 FL (ref 5–10.5)
POTASSIUM REFLEX MAGNESIUM: 3.7 MMOL/L (ref 3.5–5.1)
PROTEIN UA: NEGATIVE MG/DL
RBC # BLD: 4.55 M/UL (ref 4.2–5.9)
RBC UA: ABNORMAL /HPF (ref 0–4)
SODIUM BLD-SCNC: 138 MMOL/L (ref 136–145)
SPECIFIC GRAVITY UA: 1.01 (ref 1–1.03)
TOTAL PROTEIN: 7.6 G/DL (ref 6.4–8.2)
URINE REFLEX TO CULTURE: ABNORMAL
URINE TYPE: ABNORMAL
UROBILINOGEN, URINE: 0.2 E.U./DL
WBC # BLD: 6.4 K/UL (ref 4–11)
WBC UA: 1 /HPF (ref 0–5)

## 2022-05-18 PROCEDURE — 96374 THER/PROPH/DIAG INJ IV PUSH: CPT

## 2022-05-18 PROCEDURE — 81001 URINALYSIS AUTO W/SCOPE: CPT

## 2022-05-18 PROCEDURE — 96375 TX/PRO/DX INJ NEW DRUG ADDON: CPT

## 2022-05-18 PROCEDURE — 80053 COMPREHEN METABOLIC PANEL: CPT

## 2022-05-18 PROCEDURE — 99284 EMERGENCY DEPT VISIT MOD MDM: CPT

## 2022-05-18 PROCEDURE — 85025 COMPLETE CBC W/AUTO DIFF WBC: CPT

## 2022-05-18 PROCEDURE — 6360000002 HC RX W HCPCS: Performed by: PHYSICIAN ASSISTANT

## 2022-05-18 PROCEDURE — 83690 ASSAY OF LIPASE: CPT

## 2022-05-18 RX ORDER — ACETAMINOPHEN 500 MG
1000 TABLET ORAL 4 TIMES DAILY PRN
Qty: 60 TABLET | Refills: 0 | Status: SHIPPED | OUTPATIENT
Start: 2022-05-18

## 2022-05-18 RX ORDER — CYCLOBENZAPRINE HCL 10 MG
10 TABLET ORAL 3 TIMES DAILY PRN
Qty: 21 TABLET | Refills: 0 | Status: SHIPPED | OUTPATIENT
Start: 2022-05-18 | End: 2022-05-28

## 2022-05-18 RX ORDER — ACETAMINOPHEN 500 MG
1000 TABLET ORAL 4 TIMES DAILY PRN
Qty: 60 TABLET | Refills: 0 | Status: SHIPPED | OUTPATIENT
Start: 2022-05-18 | End: 2022-05-18 | Stop reason: SDUPTHER

## 2022-05-18 RX ORDER — IBUPROFEN 600 MG/1
600 TABLET ORAL 3 TIMES DAILY PRN
Qty: 30 TABLET | Refills: 0 | Status: SHIPPED | OUTPATIENT
Start: 2022-05-18

## 2022-05-18 RX ORDER — KETOROLAC TROMETHAMINE 30 MG/ML
15 INJECTION, SOLUTION INTRAMUSCULAR; INTRAVENOUS ONCE
Status: COMPLETED | OUTPATIENT
Start: 2022-05-18 | End: 2022-05-18

## 2022-05-18 RX ORDER — ONDANSETRON 2 MG/ML
4 INJECTION INTRAMUSCULAR; INTRAVENOUS ONCE
Status: COMPLETED | OUTPATIENT
Start: 2022-05-18 | End: 2022-05-18

## 2022-05-18 RX ORDER — ORPHENADRINE CITRATE 30 MG/ML
60 INJECTION INTRAMUSCULAR; INTRAVENOUS ONCE
Status: COMPLETED | OUTPATIENT
Start: 2022-05-18 | End: 2022-05-18

## 2022-05-18 RX ORDER — IBUPROFEN 600 MG/1
600 TABLET ORAL 3 TIMES DAILY PRN
Qty: 30 TABLET | Refills: 0 | Status: SHIPPED | OUTPATIENT
Start: 2022-05-18 | End: 2022-05-18 | Stop reason: SDUPTHER

## 2022-05-18 RX ORDER — CYCLOBENZAPRINE HCL 10 MG
10 TABLET ORAL 3 TIMES DAILY PRN
Qty: 21 TABLET | Refills: 0 | Status: SHIPPED | OUTPATIENT
Start: 2022-05-18 | End: 2022-05-18 | Stop reason: SDUPTHER

## 2022-05-18 RX ADMIN — KETOROLAC TROMETHAMINE 15 MG: 30 INJECTION, SOLUTION INTRAMUSCULAR at 08:48

## 2022-05-18 RX ADMIN — ONDANSETRON 4 MG: 2 INJECTION INTRAMUSCULAR; INTRAVENOUS at 08:48

## 2022-05-18 RX ADMIN — ORPHENADRINE CITRATE 60 MG: 30 INJECTION INTRAMUSCULAR; INTRAVENOUS at 08:48

## 2022-05-18 ASSESSMENT — ENCOUNTER SYMPTOMS
NAUSEA: 1
ABDOMINAL PAIN: 1
DIARRHEA: 0
CONSTIPATION: 0
BACK PAIN: 1
SHORTNESS OF BREATH: 0
VOMITING: 0
SORE THROAT: 0

## 2022-05-18 ASSESSMENT — PAIN DESCRIPTION - LOCATION
LOCATION: BACK
LOCATION: BACK

## 2022-05-18 ASSESSMENT — PAIN SCALES - GENERAL: PAINLEVEL_OUTOF10: 7

## 2022-05-18 NOTE — Clinical Note
Pablo Duffy was seen and treated in our emergency department on 5/18/2022. He may return to work on 05/19/2022. If you have any questions or concerns, please don't hesitate to call.       Ruth Boeck, PA-C

## 2022-05-18 NOTE — Clinical Note
Nataliia Juarez was seen and treated in our emergency department on 5/18/2022. He may return to work on 05/19/2022. If you have any questions or concerns, please don't hesitate to call.       Celena Boothe PA-C

## 2022-05-18 NOTE — ED PROVIDER NOTES
[address] [facility] ENCOUNTER      Pt Name: Farhana Salvador. MRN: [id number]  Birthdate 1970  Date of evaluation: 5/18/2022  Provider: Nanette Gibson PA-C    This patient was not seen and evaluated by the attending physician No att. providers found. CHIEF COMPLAINT      Chief Complaint: back pain      HISTORYOF PRESENT ILLNESS  (Location/Symptom, Timing/Onset, Context/Setting, Quality, Duration, Modifying Factors, Severity.)   Maile Sheppard Sr. is a 49 y.o. male who presents to the emergency department complaining of back pain. Yesterday around 3 in the afternoon he developed bilateral low back pain. It has progressively worsened since onset. Nothing makes it better and it worsens with certain positions or movements. He has not taken anything for it. He denies any injury. He denies associated numbness or weakness of extremities, loss control of bowel or bladder, saddle anesthesia, fever. He does report some generalized abdominal pain and a headache that onset around the same time. He says he has had similar headaches in the past and he associates it with his elevated blood pressure. He has had some nausea but no vomiting. Denies diarrhea. Denies urinary complaints. Nursing Notes were reviewed and I agree. REVIEW OF SYSTEMS    (2-9 systems for level 4, 10 or more forlevel 5)     Review of Systems   Constitutional: Negative for chills and fever. HENT: Negative for sore throat. Respiratory: Negative for shortness of breath. Cardiovascular: Negative for chest pain. Gastrointestinal: Positive for abdominal pain and nausea. Negative for constipation, diarrhea and vomiting. Genitourinary: Negative for difficulty urinating and dysuria. Musculoskeletal: Positive for back pain. Skin: Negative for rash. Neurological: Positive for headaches. Negative for weakness, light-headedness and numbness.    Psychiatric/Behavioral: The patient is not nervous/anxious. All other systems reviewed and are negative. Positives and Pertinent negatives as per HPI. Except as noted above the remainder of the review of systems was reviewed and negative. PAST MEDICALHISTORY         Diagnosis Date    Arthritis     Asthma     Bell's palsy 9/5/12    Cerebral artery occlusion with cerebral infarction ([facility] [address]     over 12years; states no physical residual, but poor memory    Chronic pain disorder 10/29/10    Depression 7/13/11    Gout 02/15/2011    toe    Hx of blood clots     Hyperlipidemia     Hypertension 401.9    Long term (current) use of systemic steroids 01/20/2016    Mood swings 3/24/10    Sarcoidosis 3/7/10       SURGICAL HISTORY           Procedure Laterality Date    CYSTOSCOPY N/A 4/8/2019    CYSTOSCOPY performed by Еелна Campbell MD at [address] [facility], [address] [facility]       Discharge Medication List as of 5/18/2022 10:29 AM      CONTINUE these medications which have NOT CHANGED    Details   dicyclomine (BENTYL) 10 MG capsule Take 1 capsule by mouth every 6 hours as needed (cramps), Disp-20 capsule, R-0Print      ondansetron (ZOFRAN ODT) 4 MG disintegrating tablet Take 1 tablet by mouth every 8 hours as needed for Nausea, Disp-20 tablet, R-0Print      predniSONE (DELTASONE) 20 MG tablet Take 20 mg by mouth dailyHistorical Med      Vilazodone HCl 10 & 20 & 40 MG KIT Take 1 each by mouth daily, Disp-1 kit, R-0Normal      allopurinol (ZYLOPRIM) 100 MG tablet Take 1 tablet by mouth daily, Disp-30 tablet, R-3Normal      omeprazole (PRILOSEC) 20 MG delayed release capsule Take 1 capsule by mouth daily, Disp-30 capsule, R-3Normal      amLODIPine (NORVASC) 10 MG tablet Take 1 tablet by mouth daily, Disp-30 tablet, R-5             ALLERGIES     Patient has no known allergies.     FAMILY HISTORY           Problem Relation Age of Onset    Heart Disease Mother     Diabetes Mother    Denny Diabetes Father  Cancer Father      Family Status   Relation Name Status    Mother      Father  (Not Specified)        SOCIAL HISTORY    reports that he has quit smoking. His smoking use included cigarettes. He has a 0.50 pack-year smoking history. He has never used smokeless tobacco. He reports previous alcohol use. He reports current drug use. Drug: Marijuana Dauna Other). PHYSICAL EXAM    (up to 7 for level 4, 8 or more for level 5)     ED Triage Vitals [22 0703]   BP Temp Temp Source Pulse Resp SpO2 Height Weight   (!) 177/104 98.1 °F (36.7 °C) Tympanic 68 17 99 % 6' (1.829 m) --       Physical Exam  Vitals and nursing note reviewed. Constitutional:       General: He is not in acute distress. Appearance: He is well-developed. HENT:      Head: Normocephalic and atraumatic. Cardiovascular:      Rate and Rhythm: Normal rate and regular rhythm. Heart sounds: Normal heart sounds. Pulmonary:      Effort: Pulmonary effort is normal. No respiratory distress. Breath sounds: Normal breath sounds. Abdominal:      General: There is no distension. Palpations: Abdomen is soft. Tenderness: There is no abdominal tenderness. Musculoskeletal:         General: Normal range of motion. Cervical back: Neck supple. Thoracic back: Normal.      Lumbar back: Tenderness (bilateral low back without significant midline tenderness) present. Skin:     General: Skin is warm and dry. Neurological:      General: No focal deficit present. Mental Status: He is alert and oriented to person, place, and time. Motor: No weakness.    Psychiatric:         Behavior: Behavior normal.            DIAGNOSTIC RESULTS       LABS:  Labs Reviewed   COMPREHENSIVE METABOLIC PANEL W/ REFLEX TO MG FOR LOW K - Abnormal; Notable for the following components:       Result Value    Glucose 106 (*)     CREATININE 0.7 (*)     All other components within normal limits   URINALYSIS WITH REFLEX TO CULTURE - Abnormal; Notable for the following components:    Blood, Urine MODERATE (*)     All other components within normal limits   MICROSCOPIC URINALYSIS - Abnormal; Notable for the following components:    Mucus, UA 2+ (*)     RBC, UA 5-10 (*)     All other components within normal limits   CBC WITH AUTO DIFFERENTIAL   LIPASE       When ordered, only abnormal lab results are displayed. All other labs are within normal range or not returned as of the dictation. EMERGENCY DEPARTMENT COURSE and DIFFERENTIAL DIAGNOSIS/MDM:   Vitals:    Vitals:    05/18/22 0703 05/18/22 0915 05/18/22 1000   BP: (!) 177/104 (!) 125/104 (!) 144/93   Pulse: 68 59 62   Resp: 17 14 14   Temp: 98.1 °F (36.7 °C)     TempSrc: Tympanic     SpO2: 99% 98% 100%   Height: 6' (1.829 m)          I have evaluated this patient. My supervising physician was available for consultation. The patient is nontoxic, afebrile. His neurovascular intact. He has no acute red flag signs or symptoms concerning for acute neurosurgical emergency. Initial blood pressure was quite high but has come down to 144/93. He has not yet taken blood pressure meds. He felt better after Toradol, Norflex and Zofran. His urine showed no evidence of infection. Lipase, CBC, CMP all unremarkable. I do suspect this is musculoskeletal etiology. He was discharged with prescriptions for Flexeril, Tylenol, ibuprofen and I asked him to follow-up with PCP if no better in 3 to 5 days. Discussed results, diagnosis and plan with patient and/or family. Questions addressed. Dispositionand follow-up agreed upon. Specific discharge instructions explained. The patient and/or family and I have discussed the diagnosis and risks, and we agree with discharging home to follow-up with their primary care,specialist or referral doctor. We also discussed returning to the Emergency Department immediately if new or worsening symptoms occur.  We have discussed the symptoms which are most concerning that necessitate immediatereturn. PROCEDURES:  None    FINAL IMPRESSION      1. Acute bilateral low back pain without sciatica    2. Nausea    3.  Acute nonintractable headache, unspecified headache type          DISPOSITION/PLAN   DISPOSITION Decision To Discharge 05/18/2022 09:58:01 AM      PATIENT REFERRED TO:  MD Royal Thomas [address] [facility]  [phone]    Schedule an appointment as soon as possible for a visit       [facility] Emergency Department  [address] [place] [address]  [phone]    As needed, If symptoms worsen      MEDICATIONS:  Discharge Medication List as of 5/18/2022 10:29 AM          (Please note that portions of this note were completed with a voice recognition program.  Efforts were made toedit the dictations but occasionally words are mis-transcribed.)    MELISA Gagnon PA-C  05/18/22 [id number]

## 2022-11-22 ENCOUNTER — HOSPITAL ENCOUNTER (EMERGENCY)
Age: 52
Discharge: HOME | End: 2022-11-22
Payer: MEDICARE

## 2022-11-22 VITALS
BODY MASS INDEX: 27.26 KG/M2 | SYSTOLIC BLOOD PRESSURE: 162 MMHG | TEMPERATURE: 97.3 F | RESPIRATION RATE: 15 BRPM | HEART RATE: 68 BPM | WEIGHT: 201.28 LBS | HEIGHT: 72 IN | DIASTOLIC BLOOD PRESSURE: 106 MMHG | OXYGEN SATURATION: 99 %

## 2022-11-22 DIAGNOSIS — Z20.822 LAB TEST NEGATIVE FOR COVID-19 VIRUS: ICD-10-CM

## 2022-11-22 DIAGNOSIS — B34.9 VIRAL ILLNESS: Primary | ICD-10-CM

## 2022-11-22 LAB
EKG ATRIAL RATE: 68 BPM
EKG DIAGNOSIS: NORMAL
EKG P AXIS: 66 DEGREES
EKG P-R INTERVAL: 164 MS
EKG Q-T INTERVAL: 386 MS
EKG QRS DURATION: 88 MS
EKG QTC CALCULATION (BAZETT): 410 MS
EKG R AXIS: 43 DEGREES
EKG T AXIS: 38 DEGREES
EKG VENTRICULAR RATE: 68 BPM
RAPID INFLUENZA  B AGN: NEGATIVE
RAPID INFLUENZA A AGN: NEGATIVE
SARS-COV-2, NAAT: NOT DETECTED

## 2022-11-22 PROCEDURE — 87635 SARS-COV-2 COVID-19 AMP PRB: CPT

## 2022-11-22 PROCEDURE — 87804 INFLUENZA ASSAY W/OPTIC: CPT

## 2022-11-22 PROCEDURE — 6370000000 HC RX 637 (ALT 250 FOR IP): Performed by: NURSE PRACTITIONER

## 2022-11-22 PROCEDURE — 93005 ELECTROCARDIOGRAM TRACING: CPT | Performed by: NURSE PRACTITIONER

## 2022-11-22 PROCEDURE — 99284 EMERGENCY DEPT VISIT MOD MDM: CPT

## 2022-11-22 RX ORDER — ONDANSETRON 4 MG/1
4 TABLET, ORALLY DISINTEGRATING ORAL ONCE
Status: COMPLETED | OUTPATIENT
Start: 2022-11-22 | End: 2022-11-22

## 2022-11-22 RX ORDER — ACETAMINOPHEN 500 MG
1000 TABLET ORAL ONCE
Status: COMPLETED | OUTPATIENT
Start: 2022-11-22 | End: 2022-11-22

## 2022-11-22 RX ORDER — ACETAMINOPHEN 500 MG
500 TABLET ORAL 4 TIMES DAILY PRN
Qty: 28 TABLET | Refills: 0 | Status: SHIPPED | OUTPATIENT
Start: 2022-11-22 | End: 2022-12-01

## 2022-11-22 RX ORDER — ONDANSETRON 4 MG/1
4-8 TABLET, ORALLY DISINTEGRATING ORAL EVERY 12 HOURS PRN
Qty: 12 TABLET | Refills: 0 | Status: SHIPPED | OUTPATIENT
Start: 2022-11-22

## 2022-11-22 RX ORDER — IBUPROFEN 600 MG/1
600 TABLET ORAL 3 TIMES DAILY PRN
Qty: 15 TABLET | Refills: 0 | Status: SHIPPED | OUTPATIENT
Start: 2022-11-22 | End: 2022-11-27

## 2022-11-22 RX ORDER — LOPERAMIDE HYDROCHLORIDE 2 MG/1
2 CAPSULE ORAL 4 TIMES DAILY PRN
Qty: 15 CAPSULE | Refills: 0 | Status: SHIPPED | OUTPATIENT
Start: 2022-11-22 | End: 2022-11-27

## 2022-11-22 RX ADMIN — ONDANSETRON 4 MG: 4 TABLET, ORALLY DISINTEGRATING ORAL at 09:27

## 2022-11-22 RX ADMIN — ACETAMINOPHEN 1000 MG: 500 TABLET ORAL at 09:19

## 2022-11-22 ASSESSMENT — PAIN DESCRIPTION - DESCRIPTORS: DESCRIPTORS: ACHING

## 2022-11-22 ASSESSMENT — PAIN DESCRIPTION - LOCATION
LOCATION: GENERALIZED
LOCATION: GENERALIZED

## 2022-11-22 ASSESSMENT — PAIN - FUNCTIONAL ASSESSMENT: PAIN_FUNCTIONAL_ASSESSMENT: 0-10

## 2022-11-22 ASSESSMENT — PAIN SCALES - GENERAL
PAINLEVEL_OUTOF10: 6
PAINLEVEL_OUTOF10: 6

## 2022-11-22 NOTE — DISCHARGE INSTRUCTIONS
Return to the emergency room for new or worsening symptoms including, not limited to, developing chest pain accompanied by nausea, vomiting, dizziness, passing out, feeling as if you are going to pass out, shortness of breath, blue lips, or other symptoms/concerns. Follow-up with your PCP for reevaluation next 1-2 days. Drink plenty of fluids electrolyte fluid restrictions and if you are restrictions please here to those limits.     Get plenty of rest.

## 2022-11-22 NOTE — ED NOTES
Patient upset because he is in the lobby. This RN explained the process and offered a big gulp, patient is understanding and just bailey not want to be around other sick people.   He was given big gulp and will sit in end of the [place] away from sick people until his tests result     Clark Correa RN  11/22/22 [id number]

## 2022-11-29 ASSESSMENT — ENCOUNTER SYMPTOMS
RHINORRHEA: 0
WHEEZING: 0
ABDOMINAL PAIN: 0
DIARRHEA: 1
NAUSEA: 1
VOMITING: 1
SHORTNESS OF BREATH: 0
SORE THROAT: 1
COUGH: 0

## 2022-11-29 NOTE — ED PROVIDER NOTES
[address] [facility]  [address] [address]  Dept: [phone]  Loc: [address] ENCOUNTER    CONNIE. I have evaluated this patient. My supervising physician was available for consultation. CHIEF COMPLAINT    Chief Complaint   Patient presents with    Generalized Body Aches     HA, lightheaded when getting up  x 3 days, diarrhea        HPI    Mat Dense. is a 47 y.o. nontoxic and well-appearing, mildly distressed male who presents with concern for possible COVID-19 infection. Symptoms include nasal congestion, nausea, vomiting x2, diarrhea x7 today, lightheadedness, presyncope, subjective fever, chills, sweats, decreased appetite, 10/10 head \"ache\" and 8/10 body \"aches\". Denies chest pain, dizziness, shortness of breath, cough, change in ability to smell/taste, hemoptysis, leg/calf pain or swelling, abdominal pain, rash, urinary symptoms/retention, concerns. Onset was 3 days ago. The duration has been constant since the onset. There are no alleviating factors. The patient has not been COVID-19 or influenza vaccinated and has not had recent sick contacts. Review of Systems   Constitutional:  Positive for appetite change, chills, diaphoresis and fever. HENT:  Positive for congestion and sore throat. Negative for ear pain and rhinorrhea. Respiratory:  Negative for cough, shortness of breath and wheezing. Gastrointestinal:  Positive for diarrhea, nausea and vomiting. Negative for abdominal pain. Musculoskeletal:  Positive for myalgias. Negative for arthralgias, neck pain and neck stiffness. Skin:  Negative for rash. Neurological:  Positive for light-headedness and headaches. Negative for syncope.            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    Cerebral artery occlusion with cerebral infarction [facility])     over 12years; states no physical residual, but poor memory    Chronic pain disorder 10/29/10    Depression 7/13/11    Gout 02/15/2011    toe    Hx of blood clots     Hyperlipidemia     Hypertension 401.9    Long term (current) use of systemic steroids 01/20/2016    Mood swings 3/24/10    Sarcoidosis 3/7/10     Past Surgical History:   Procedure Laterality Date    CYSTOSCOPY N/A 4/8/2019    CYSTOSCOPY performed by Sabine Olivares MD at [facility] [address], [place], [address] [facility]  (may include discharge medications prescribed in the ED)  Current Outpatient Rx   Medication Sig Dispense Refill    ibuprofen (ADVIL;MOTRIN) 600 MG tablet Take 1 tablet by mouth 3 times daily as needed for Pain With meals 15 tablet 0    acetaminophen (TYLENOL) 500 MG tablet Take 1 tablet by mouth 4 times daily as needed for Pain 28 tablet 0    ondansetron (ZOFRAN ODT) 4 MG disintegrating tablet Take 1-2 tablets by mouth every 12 hours as needed for Nausea May Sub regular tablet (non-ODT) if insurance does not cover ODT.  12 tablet 0    dicyclomine (BENTYL) 10 MG capsule Take 1 capsule by mouth every 6 hours as needed (cramps) 20 capsule 0    predniSONE (DELTASONE) 20 MG tablet Take 20 mg by mouth daily      Vilazodone HCl 10 & 20 & 40 MG KIT Take 1 each by mouth daily 1 kit 0    allopurinol (ZYLOPRIM) 100 MG tablet Take 1 tablet by mouth daily 30 tablet 3    omeprazole (PRILOSEC) 20 MG delayed release capsule Take 1 capsule by mouth daily 30 capsule 3    amLODIPine (NORVASC) 10 MG tablet Take 1 tablet by mouth daily 30 tablet 5       ALLERGIES    No Known Allergies    FAMILY AND SOCIAL HISTORY    Family History   Problem Relation Age of Onset    Heart Disease Mother     Diabetes Mother     Diabetes Father     Cancer Father      Social History     Socioeconomic History    Marital status:      Spouse name: None    Number of children: None    Years of education: None    Highest education level: None   Occupational History    Occupation: [employer]ary at [employer]    Tobacco Use    Smoking status: Former     Packs/day: 0.25     Years: 2.00     Pack years: 0.50     Types: Cigarettes    Smokeless tobacco: Never   Vaping Use    Vaping Use: Never used   Substance and Sexual Activity    Alcohol use: Not Currently     Alcohol/week: 0.0 standard drinks    Drug use: Yes     Types: Marijuana (Weed)     Comment: marijuana for pain and depression--pt states he smoked  this morning- 4/3/19    Sexual activity: Yes     Partners: Female       PHYSICAL EXAM    VITAL SIGNS: BP (!) 162/106   Pulse 68   Temp 97.3 °F (36.3 °C) (Oral)   Resp 15   Ht 6' (1.829 m)   Wt 201 lb 4.5 oz (91.3 kg)   SpO2 99%   BMI 27.30 kg/m²   Constitutional:  Well developed, well nourished, no acute distress  Eyes: Sclera nonicteric, conjunctiva normal   Ears: external ears normal, TM's clear bilaterally  Throat/Face:  no exudate or edema of the throat, no trismus, moist mucus membranes  Neck: Supple, no enlarged tonsillar lymphadenopathy  Respiratory:  Lungs clear to auscultation bilaterally, no retractions, speaking in complete sentences, O2 saturation 99% on room air  Cardiovascular:  regular rate, regular rhythm  GI:  soft, nontender, nondistended  Neurologic:  Awake, alert and oriented, no slurred speech  Integument:  Skin is warm and dry, no rash    RADIOLOGY/PROCEDURES    No orders to display       ED COURSE & MEDICAL DECISION MAKING    See chart for details of medications given. Differential diagnoses:  Coronavirus, Influenza, Meningitis, Group A strep, Airway Obstruction, Pneumonia, Hypoxemia, Dehydration, other. Patient presenting without fever, although nontoxic in appearance. Lung sounds CTAB with oxygen saturation of 99% on room air therefore do not feel that imaging is warranted at this time. COVID test negative. Rapid influenza a/B is negative. EKG: As interpreted by EMD, see note for details. At this time additional testing is not indicated. The patient was counseled to closely monitor their symptoms, and to return immediately to the Emergency Department for any difficulty breathing, confusion, dizziness or passing out, vomiting, chest pain, high fevers, weakness, or any other new or concerning symptoms. There is no evidence of emergent medical condition at this time, and the patient will be discharged in good condition. The patient was instructed to follow up as an outpatient in 1-2 days with his PCP. Medication as prescribed. The patient was instructed to return to the ED immediately for any new or worsening symptoms. The patient verbalized understanding and is agreeable to plan for discharge and follow-up. FINAL IMPRESSION    1. Viral illness    2.  Lab test negative for COVID-19 virus        PLAN  Outpatient medications, followup, and discharge instructions (see EMR)      (Please note that this note was completed with a voice recognition program.  Every attempt was made to edit the dictations, but inevitably there remain words that are mis-transcribed.)             ART Gustafson CNP  11/29/22 [id number]

## 2022-12-01 ENCOUNTER — HOSPITAL ENCOUNTER (EMERGENCY)
Age: 52
Discharge: HOME | End: 2022-12-01
Payer: MEDICARE

## 2022-12-01 ENCOUNTER — APPOINTMENT (OUTPATIENT)
Dept: GENERAL RADIOLOGY | Age: 52
End: 2022-12-01
Payer: MEDICARE

## 2022-12-01 VITALS
DIASTOLIC BLOOD PRESSURE: 108 MMHG | HEART RATE: 80 BPM | TEMPERATURE: 99.4 F | BODY MASS INDEX: 26.31 KG/M2 | SYSTOLIC BLOOD PRESSURE: 156 MMHG | RESPIRATION RATE: 15 BRPM | HEIGHT: 72 IN | WEIGHT: 194.22 LBS | OXYGEN SATURATION: 97 %

## 2022-12-01 DIAGNOSIS — I10 PRIMARY HYPERTENSION: ICD-10-CM

## 2022-12-01 DIAGNOSIS — R31.29 OTHER MICROSCOPIC HEMATURIA: Primary | ICD-10-CM

## 2022-12-01 DIAGNOSIS — B34.9 VIRAL ILLNESS: ICD-10-CM

## 2022-12-01 LAB
ALBUMIN SERPL-MCNC: 3.9 G/DL (ref 3.4–5)
ALP BLD-CCNC: 94 U/L (ref 40–129)
ALT SERPL-CCNC: 20 U/L (ref 10–40)
ANION GAP SERPL CALCULATED.3IONS-SCNC: 16 MMOL/L (ref 3–16)
AST SERPL-CCNC: 20 U/L (ref 15–37)
BACTERIA: ABNORMAL /HPF
BASOPHILS ABSOLUTE: 0.1 K/UL (ref 0–0.2)
BASOPHILS RELATIVE PERCENT: 1.2 %
BILIRUB SERPL-MCNC: 0.3 MG/DL (ref 0–1)
BILIRUBIN DIRECT: <0.2 MG/DL (ref 0–0.3)
BILIRUBIN URINE: NEGATIVE
BILIRUBIN, INDIRECT: NORMAL MG/DL (ref 0–1)
BLOOD, URINE: ABNORMAL
BUN BLDV-MCNC: 12 MG/DL (ref 7–20)
CALCIUM SERPL-MCNC: 9.4 MG/DL (ref 8.3–10.6)
CHLORIDE BLD-SCNC: 103 MMOL/L (ref 99–110)
CLARITY: CLEAR
CO2: 19 MMOL/L (ref 21–32)
COLOR: YELLOW
CREAT SERPL-MCNC: 0.7 MG/DL (ref 0.9–1.3)
EOSINOPHILS ABSOLUTE: 0.1 K/UL (ref 0–0.6)
EOSINOPHILS RELATIVE PERCENT: 2.2 %
EPITHELIAL CELLS, UA: 1 /HPF (ref 0–5)
GFR SERPL CREATININE-BSD FRML MDRD: >60 ML/MIN/{1.73_M2}
GLUCOSE BLD-MCNC: 102 MG/DL (ref 70–99)
GLUCOSE URINE: NEGATIVE MG/DL
HCT VFR BLD CALC: 45.6 % (ref 40.5–52.5)
HEMOGLOBIN: 14.9 G/DL (ref 13.5–17.5)
HYALINE CASTS: 0 /LPF (ref 0–8)
KETONES, URINE: ABNORMAL MG/DL
LEUKOCYTE ESTERASE, URINE: NEGATIVE
LIPASE: 32 U/L (ref 13–60)
LYMPHOCYTES ABSOLUTE: 1.9 K/UL (ref 1–5.1)
LYMPHOCYTES RELATIVE PERCENT: 35.3 %
MCH RBC QN AUTO: 30 PG (ref 26–34)
MCHC RBC AUTO-ENTMCNC: 32.6 G/DL (ref 31–36)
MCV RBC AUTO: 91.9 FL (ref 80–100)
MICROSCOPIC EXAMINATION: YES
MONOCYTES ABSOLUTE: 0.6 K/UL (ref 0–1.3)
MONOCYTES RELATIVE PERCENT: 11.1 %
MUCUS: PRESENT
NEUTROPHILS ABSOLUTE: 2.6 K/UL (ref 1.7–7.7)
NEUTROPHILS RELATIVE PERCENT: 50.2 %
NITRITE, URINE: NEGATIVE
PDW BLD-RTO: 14.7 % (ref 12.4–15.4)
PH UA: 5.5 (ref 5–8)
PLATELET # BLD: 228 K/UL (ref 135–450)
PLATELET SLIDE REVIEW: ADEQUATE
PMV BLD AUTO: 7.1 FL (ref 5–10.5)
POTASSIUM SERPL-SCNC: 3.7 MMOL/L (ref 3.5–5.1)
PROTEIN UA: 30 MG/DL
RAPID INFLUENZA  B AGN: NEGATIVE
RAPID INFLUENZA A AGN: NEGATIVE
RBC # BLD: 4.96 M/UL (ref 4.2–5.9)
RBC # BLD: NORMAL 10*6/UL
RBC UA: 7 /HPF (ref 0–4)
S PYO AG THROAT QL: NEGATIVE
SARS-COV-2, NAAT: NOT DETECTED
SLIDE REVIEW: NORMAL
SODIUM BLD-SCNC: 138 MMOL/L (ref 136–145)
SPECIFIC GRAVITY UA: 1.03 (ref 1–1.03)
TOTAL PROTEIN: 7.9 G/DL (ref 6.4–8.2)
URINE REFLEX TO CULTURE: ABNORMAL
URINE TYPE: ABNORMAL
UROBILINOGEN, URINE: 1 E.U./DL
WBC # BLD: 5.3 K/UL (ref 4–11)
WBC UA: 1 /HPF (ref 0–5)

## 2022-12-01 PROCEDURE — 36415 COLL VENOUS BLD VENIPUNCTURE: CPT

## 2022-12-01 PROCEDURE — 96361 HYDRATE IV INFUSION ADD-ON: CPT

## 2022-12-01 PROCEDURE — 80048 BASIC METABOLIC PNL TOTAL CA: CPT

## 2022-12-01 PROCEDURE — 99284 EMERGENCY DEPT VISIT MOD MDM: CPT

## 2022-12-01 PROCEDURE — 6370000000 HC RX 637 (ALT 250 FOR IP): Performed by: NURSE PRACTITIONER

## 2022-12-01 PROCEDURE — 96374 THER/PROPH/DIAG INJ IV PUSH: CPT

## 2022-12-01 PROCEDURE — 6360000002 HC RX W HCPCS: Performed by: NURSE PRACTITIONER

## 2022-12-01 PROCEDURE — 71046 X-RAY EXAM CHEST 2 VIEWS: CPT

## 2022-12-01 PROCEDURE — 85025 COMPLETE CBC W/AUTO DIFF WBC: CPT

## 2022-12-01 PROCEDURE — 87081 CULTURE SCREEN ONLY: CPT

## 2022-12-01 PROCEDURE — 87635 SARS-COV-2 COVID-19 AMP PRB: CPT

## 2022-12-01 PROCEDURE — 2500000003 HC RX 250 WO HCPCS: Performed by: NURSE PRACTITIONER

## 2022-12-01 PROCEDURE — 80076 HEPATIC FUNCTION PANEL: CPT

## 2022-12-01 PROCEDURE — 96375 TX/PRO/DX INJ NEW DRUG ADDON: CPT

## 2022-12-01 PROCEDURE — 2580000003 HC RX 258: Performed by: NURSE PRACTITIONER

## 2022-12-01 PROCEDURE — 81001 URINALYSIS AUTO W/SCOPE: CPT

## 2022-12-01 PROCEDURE — 87804 INFLUENZA ASSAY W/OPTIC: CPT

## 2022-12-01 PROCEDURE — 87880 STREP A ASSAY W/OPTIC: CPT

## 2022-12-01 PROCEDURE — 83690 ASSAY OF LIPASE: CPT

## 2022-12-01 PROCEDURE — 87077 CULTURE AEROBIC IDENTIFY: CPT

## 2022-12-01 RX ORDER — LABETALOL HYDROCHLORIDE 5 MG/ML
10 INJECTION, SOLUTION INTRAVENOUS ONCE
Status: COMPLETED | OUTPATIENT
Start: 2022-12-01 | End: 2022-12-01

## 2022-12-01 RX ORDER — 0.9 % SODIUM CHLORIDE 0.9 %
1000 INTRAVENOUS SOLUTION INTRAVENOUS ONCE
Status: COMPLETED | OUTPATIENT
Start: 2022-12-01 | End: 2022-12-01

## 2022-12-01 RX ORDER — CETIRIZINE HYDROCHLORIDE 10 MG/1
10 TABLET ORAL DAILY
Qty: 30 TABLET | Refills: 0 | Status: SHIPPED | OUTPATIENT
Start: 2022-12-01 | End: 2022-12-31

## 2022-12-01 RX ORDER — AMLODIPINE BESYLATE 5 MG/1
5 TABLET ORAL ONCE
Status: DISCONTINUED | OUTPATIENT
Start: 2022-12-01 | End: 2022-12-01

## 2022-12-01 RX ORDER — FLUTICASONE PROPIONATE 50 MCG
1 SPRAY, SUSPENSION (ML) NASAL DAILY
Qty: 16 G | Refills: 0 | Status: SHIPPED | OUTPATIENT
Start: 2022-12-01

## 2022-12-01 RX ORDER — METHOCARBAMOL 500 MG/1
500 TABLET, FILM COATED ORAL 4 TIMES DAILY
Qty: 40 TABLET | Refills: 0 | Status: SHIPPED | OUTPATIENT
Start: 2022-12-01 | End: 2022-12-11

## 2022-12-01 RX ORDER — KETOROLAC TROMETHAMINE 30 MG/ML
30 INJECTION, SOLUTION INTRAMUSCULAR; INTRAVENOUS ONCE
Status: COMPLETED | OUTPATIENT
Start: 2022-12-01 | End: 2022-12-01

## 2022-12-01 RX ORDER — CETIRIZINE HYDROCHLORIDE 10 MG/1
10 TABLET ORAL DAILY
Status: DISCONTINUED | OUTPATIENT
Start: 2022-12-01 | End: 2022-12-01 | Stop reason: HOSPADM

## 2022-12-01 RX ADMIN — LABETALOL HYDROCHLORIDE 10 MG: 5 INJECTION, SOLUTION INTRAVENOUS at 12:30

## 2022-12-01 RX ADMIN — KETOROLAC TROMETHAMINE 30 MG: 30 INJECTION, SOLUTION INTRAMUSCULAR; INTRAVENOUS at 10:36

## 2022-12-01 RX ADMIN — CETIRIZINE HYDROCHLORIDE 10 MG: 10 TABLET, FILM COATED ORAL at 10:36

## 2022-12-01 RX ADMIN — SODIUM CHLORIDE 1000 ML: 9 INJECTION, SOLUTION INTRAVENOUS at 10:36

## 2022-12-01 ASSESSMENT — LIFESTYLE VARIABLES
HOW OFTEN DO YOU HAVE A DRINK CONTAINING ALCOHOL: NEVER
HOW MANY STANDARD DRINKS CONTAINING ALCOHOL DO YOU HAVE ON A TYPICAL DAY: PATIENT DOES NOT DRINK

## 2022-12-01 ASSESSMENT — PAIN - FUNCTIONAL ASSESSMENT: PAIN_FUNCTIONAL_ASSESSMENT: NONE - DENIES PAIN

## 2022-12-01 ASSESSMENT — PAIN SCALES - GENERAL
PAINLEVEL_OUTOF10: 7
PAINLEVEL_OUTOF10: 5

## 2022-12-01 ASSESSMENT — PAIN DESCRIPTION - DESCRIPTORS: DESCRIPTORS: DISCOMFORT

## 2022-12-01 ASSESSMENT — PAIN DESCRIPTION - LOCATION: LOCATION: GENERALIZED

## 2022-12-01 ASSESSMENT — PAIN DESCRIPTION - PAIN TYPE: TYPE: ACUTE PAIN

## 2022-12-01 NOTE — ED NOTES
Patient requesting to speak with charge nurse. Patient voiced concern of not getting appropriate treatment every time he has been here. Patient stating incidents dating back several years up until last week with visits including himself and others. Explained to patient Emergency Department flow, triage and results waiting process, as well as care focused based on the complaint. States that he just wants a complete check up. Advised patient that the ED focuses on the complaint, and a routine check up would be appropriate from his PCP.       Zee Wilson RN  12/01/22 [id number]

## 2022-12-01 NOTE — ED PROVIDER NOTES
[address] [facility] [address]  [address] [address]  Dept: [phone]  Loc: [address] ENCOUNTER        This patient was not seen or evaluated by the attending physician. I evaluated this patient, the attending physician was available for consultation. CHIEF COMPLAINT    Chief Complaint   Patient presents with    Headache     Pt states he has had headache, fever, chills and body aches since last week when he was here. Pt states he can not smell or taste. Pt states he was tested last week for covid and flu both were negative. Pt states last week he had vomiting and diarrhea but that has subsided. HPI    Norma Diop is a 47 y.o. male who presents to the emergency department with ongoing intermittent headaches, subjective fevers, full body aches, chills for the last week. He states he was seen and evaluated here about a week ago and his COVID and flu were both negative. He is concerned that his symptoms are still ongoing because he has a history of sarcoidosis and he is not vaccinated against COVID or flu. He states he knows he needs to be in his primary care physician's gotten on him about getting vaccinated but he just has not done it yet. He states he has not been able to eat or drink anything in the last 2 days because he feels so bad. Last week he was having vomiting and diarrhea but that all has subsided. He does not have chest pain or abdominal pain but rather he just feels too bad to eat or drink anything. He is complaining of sore throat. He denies unusual skin rash. He states that he cannot taste or smell anything and he would like to be rechecked for COVID and flu today. REVIEW OF SYSTEMS    Pulmonary: +no sore throat, see HPI, no hemoptysis  General: + subjective fevers, + myalgia  GI: no nausea, no vomiting  All other systems reviewed and are negative.     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 [address]     over 12years; states no physical residual, but poor memory    Chronic pain disorder 10/29/10    Depression 7/13/11    Gout 02/15/2011    toe    Hx of blood clots     Hyperlipidemia     Hypertension 401.9    Long term (current) use of systemic steroids 01/20/2016    Mood swings 3/24/10    Sarcoidosis 3/7/10     Past Surgical History:   Procedure Laterality Date    CYSTOSCOPY N/A 4/8/2019    CYSTOSCOPY performed by Sandoval Doe MD at [facility] [address], [place], [address] [facility]  (may include discharge medications prescribed in the ED)  Current Outpatient Rx   Medication Sig Dispense Refill    fluticasone (FLONASE) 50 MCG/ACT nasal spray 1 spray by Each Nostril route daily 16 g 0    cetirizine (ZYRTEC) 10 MG tablet Take 1 tablet by mouth daily 30 tablet 0    methocarbamol (ROBAXIN) 500 MG tablet Take 1 tablet by mouth 4 times daily for 10 days 40 tablet 0    ibuprofen (ADVIL;MOTRIN) 600 MG tablet Take 1 tablet by mouth 3 times daily as needed for Pain With meals 15 tablet 0    ondansetron (ZOFRAN ODT) 4 MG disintegrating tablet Take 1-2 tablets by mouth every 12 hours as needed for Nausea May Sub regular tablet (non-ODT) if insurance does not cover ODT.  12 tablet 0    dicyclomine (BENTYL) 10 MG capsule Take 1 capsule by mouth every 6 hours as needed (cramps) 20 capsule 0    predniSONE (DELTASONE) 20 MG tablet Take 20 mg by mouth daily      Vilazodone HCl 10 & 20 & 40 MG KIT Take 1 each by mouth daily 1 kit 0    allopurinol (ZYLOPRIM) 100 MG tablet Take 1 tablet by mouth daily 30 tablet 3    omeprazole (PRILOSEC) 20 MG delayed release capsule Take 1 capsule by mouth daily 30 capsule 3    amLODIPine (NORVASC) 10 MG tablet Take 1 tablet by mouth daily 30 tablet 5       ALLERGIES    No Known Allergies    FAMILY AND SOCIAL HISTORY Family History   Problem Relation Age of Onset    Heart Disease Mother     Diabetes Mother     Diabetes Father     Cancer Father      Social History     Socioeconomic History    Marital status:      Spouse name: None    Number of children: None    Years of education: None    Highest education level: None   Occupational History    Occupation: [employer]ary at [employer]    Tobacco Use    Smoking status: Former     Packs/day: 0.25     Years: 2.00     Pack years: 0.50     Types: Cigarettes    Smokeless tobacco: Never   Vaping Use    Vaping Use: Never used   Substance and Sexual Activity    Alcohol use: Not Currently     Alcohol/week: 0.0 standard drinks    Drug use: Yes     Types: Marijuana (Weed)     Comment: marijuana for pain and depression--pt states he smoked  this morning- 4/3/19    Sexual activity: Yes     Partners: Female       PHYSICAL EXAM    VITAL SIGNS: BP (!) 156/108   Pulse 80   Temp 99.4 °F (37.4 °C) (Oral)   Resp 15   Ht 6' (1.829 m)   Wt 194 lb 3.6 oz (88.1 kg)   SpO2 97%   BMI 26.34 kg/m²   Constitutional:  Well developed, well nourished, no acute distress  Eyes: Sclera nonicteric, conjunctiva normal   Ears: external ears normal TMs are unremarkable. Nonpainful ear exam.  Throat/Face:  no exudate or edema of the throat, no trismus  Neck: Supple, no enlarged tonsillar lymphadenopathy  Respiratory:  Lungs clear to auscultation bilaterally, no retractions  Cardiovascular: Regular rhythm and rate, S1-S2. No murmur  GI: soft, nontender nondistended abdomen without rebound or guarding. Normal active bowel sounds throughout auscultation.   Neurologic: Awake, alert and oriented, no slurred speech  Integument: Skin is warm and dry, no rash    RADIOLOGY/PROCEDURES    XR CHEST (2 VW)   Final Result   No acute cardiopulmonary findings           Labs Reviewed   BASIC METABOLIC PANEL - Abnormal; Notable for the following components:       Result Value    CO2 19 (*)     Glucose 102 (*)     Creatinine 0.7 (*)     All other components within normal limits   URINALYSIS WITH REFLEX TO CULTURE - Abnormal; Notable for the following components:    Ketones, Urine TRACE (*)     Blood, Urine MODERATE (*)     Protein, UA 30 (*)     All other components within normal limits   MICROSCOPIC URINALYSIS - Abnormal; Notable for the following components:    RBC, UA 7 (*)     Mucus, UA Present (*)     All other components within normal limits   COVID-19, RAPID   STREP SCREEN GROUP A THROAT   RAPID INFLUENZA A/B ANTIGENS   CULTURE, BETA STREP CONFIRM PLATES   CBC WITH AUTO DIFFERENTIAL   HEPATIC FUNCTION PANEL   LIPASE       ED COURSE & MEDICAL DECISION MAKING    See chart for details of medications given. Vitals:    12/01/22 1217 12/01/22 1300 12/01/22 1315 12/01/22 1330   BP:  (!) 154/105 (!) 146/103 (!) 156/108   Pulse: 70 73 71 80   Resp:  16 16 15   Temp:       TempSrc:       SpO2:   95% 97%   Weight:       Height:         Medications   0.9 % sodium chloride bolus (0 mLs IntraVENous Stopped 12/1/22 1136)   ketorolac (TORADOL) injection 30 mg (30 mg IntraVENous Given 12/1/22 1036)   labetalol (NORMODYNE;TRANDATE) injection 10 mg (10 mg IntraVENous Given 12/1/22 1230)     I have seen and evaluated this patient. My attending physician was available for consultation    Differential diagnosis includes but is not limited to influenza, COVID, other viral illness, pneumonia, bronchitis, allergic rhinitis, URI, other    He is nontoxic in appearance. He is afebrile. He has elevated blood pressures today. He does have a history of hypertension. He does take amlodipine but he is not taking it in the last couple of days because he states he just has not felt good enough to take his medications. When he first arrived to the emergency department apparently he had multiple complaints. The charge nurse did meet with the patient prior to me evaluating the patient and for me the patient was pleasant, calm and cooperative.   I did spend a lengthy amount of time with him each time that I reevaluated him going over each lab test so that he had a clear understanding of what we did for him and what the results meant. His CBC is unremarkable    His electrolytes are unremarkable. CO2 is 19, BUN 12, creatinine 0.7, serum glucose 102    LFTs are unremarkable. Strep, COVID and influenza are negative    Chest x-ray interpreted by radiology and reviewed by myself showed no acute cardiopulmonary finding    His urinalysis does not reveal infection however he does have moderate blood. On chart review he has had microscopic blood in his urine since at least 2021 without infection present. He states he has not had this worked up. He does not see gross blood. He denies problems with urination. I had a long conversation with the patient about the importance of following up microscopic hematuria and a male his age. This warrants a urologic evaluation and consultation. The patient states that he will call the urologist office today to schedule follow-up appointment. The patient was given a liter of fluids, and Toradol. On reevaluation he states that he is feeling much better. I did give him 10 mg of labetalol to help bring his blood pressure down a little bit. His headache had resolved. At the time of his discharge his blood pressure was 156/108. I instructed him on the importance of never skipping his hypertensive medications. He states that he has plenty of medications at home. He does not need a refill. I instructed him to take his pill today after he got home. Patient states that he will do this. I instructed him to follow-up with his primary care physician as well as the urologist and to always return to the emergency department for worsening symptoms. The patient felt very reassured today and he was very thankful for the care that he received in the emergency department today. FINAL IMPRESSION    1.  Other microscopic hematuria 2. Viral illness    3.  Primary hypertension        PLAN  Outpatient medications, followup, and discharge instructions (see EMR)      (Please note that this note was completed with a voice recognition program.  Every attempt was made to edit the dictations, but inevitably there remain words that are mis-transcribed.)              Sarika Del Valle, ART - CNP  12/02/22 [id number]

## 2022-12-03 LAB
ORGANISM: ABNORMAL
S PYO THROAT QL CULT: ABNORMAL
S PYO THROAT QL CULT: ABNORMAL

## 2022-12-13 LAB — PROSTATE SPECIFIC ANTIGEN: 1.82 NG/ML (ref 0–4)

## 2024-04-26 ENCOUNTER — HOSPITAL ENCOUNTER (INPATIENT)
Age: 54
LOS: 4 days | Discharge: HOME HEALTH | DRG: 872 | End: 2024-04-30
Attending: EMERGENCY MEDICINE | Admitting: INTERNAL MEDICINE
Payer: MEDICARE

## 2024-04-26 ENCOUNTER — APPOINTMENT (OUTPATIENT)
Dept: GENERAL RADIOLOGY | Age: 54
DRG: 872 | End: 2024-04-26
Payer: MEDICARE

## 2024-04-26 ENCOUNTER — APPOINTMENT (OUTPATIENT)
Dept: CT IMAGING | Age: 54
DRG: 872 | End: 2024-04-26
Payer: MEDICARE

## 2024-04-26 DIAGNOSIS — R51.9 ACUTE NONINTRACTABLE HEADACHE, UNSPECIFIED HEADACHE TYPE: Primary | ICD-10-CM

## 2024-04-26 DIAGNOSIS — E83.39 HYPOPHOSPHATEMIA: ICD-10-CM

## 2024-04-26 DIAGNOSIS — E83.42 HYPOMAGNESEMIA: ICD-10-CM

## 2024-04-26 DIAGNOSIS — R53.81 MALAISE: ICD-10-CM

## 2024-04-26 PROBLEM — A41.9 SEVERE SEPSIS (HCC): Status: ACTIVE | Noted: 2024-04-26

## 2024-04-26 PROBLEM — R65.20 SEVERE SEPSIS (HCC): Status: ACTIVE | Noted: 2024-04-26

## 2024-04-26 LAB
ALBUMIN SERPL-MCNC: 3.8 G/DL (ref 3.4–5)
ALBUMIN/GLOB SERPL: 0.9 {RATIO} (ref 1.1–2.2)
ALP SERPL-CCNC: 100 U/L (ref 40–129)
ALT SERPL-CCNC: 13 U/L (ref 10–40)
ANION GAP SERPL CALCULATED.3IONS-SCNC: 16 MMOL/L (ref 3–16)
APPEARANCE CSF: CLEAR
AST SERPL-CCNC: 19 U/L (ref 15–37)
BACTERIA URNS QL MICRO: ABNORMAL /HPF
BASOPHILS # BLD: 0.1 K/UL (ref 0–0.2)
BASOPHILS NFR BLD: 0.6 %
BILIRUB SERPL-MCNC: 0.3 MG/DL (ref 0–1)
BILIRUB UR QL STRIP.AUTO: NEGATIVE
BUN SERPL-MCNC: 13 MG/DL (ref 7–20)
CALCIUM SERPL-MCNC: 9.6 MG/DL (ref 8.3–10.6)
CHLORIDE SERPL-SCNC: 103 MMOL/L (ref 99–110)
CLARITY UR: CLEAR
CLOT EVALUATION CSF: ABNORMAL
CO2 SERPL-SCNC: 18 MMOL/L (ref 21–32)
COLOR CSF: COLORLESS
COLOR UR: YELLOW
CREAT SERPL-MCNC: 0.9 MG/DL (ref 0.9–1.3)
DEPRECATED RDW RBC AUTO: 15.1 % (ref 12.4–15.4)
EKG ATRIAL RATE: 104 BPM
EKG DIAGNOSIS: NORMAL
EKG P AXIS: 73 DEGREES
EKG P-R INTERVAL: 188 MS
EKG Q-T INTERVAL: 332 MS
EKG QRS DURATION: 88 MS
EKG QTC CALCULATION (BAZETT): 436 MS
EKG R AXIS: 40 DEGREES
EKG T AXIS: 45 DEGREES
EKG VENTRICULAR RATE: 104 BPM
EOSINOPHIL # BLD: 0 K/UL (ref 0–0.6)
EOSINOPHIL NFR BLD: 0 %
EPI CELLS #/AREA URNS AUTO: 1 /HPF (ref 0–5)
FLUAV RNA UPPER RESP QL NAA+PROBE: NEGATIVE
FLUBV AG NPH QL: NEGATIVE
GFR SERPLBLD CREATININE-BSD FMLA CKD-EPI: >90 ML/MIN/{1.73_M2}
GLUCOSE CSF-MCNC: 67 MG/DL (ref 40–80)
GLUCOSE SERPL-MCNC: 114 MG/DL (ref 70–99)
GLUCOSE UR STRIP.AUTO-MCNC: NEGATIVE MG/DL
HCT VFR BLD AUTO: 38.6 % (ref 40.5–52.5)
HGB BLD-MCNC: 13.2 G/DL (ref 13.5–17.5)
HGB UR QL STRIP.AUTO: ABNORMAL
HYALINE CASTS #/AREA URNS AUTO: 0 /LPF (ref 0–8)
KETONES UR STRIP.AUTO-MCNC: 15 MG/DL
LEUKOCYTE ESTERASE UR QL STRIP.AUTO: ABNORMAL
LYMPHOCYTES # BLD: 1.8 K/UL (ref 1–5.1)
LYMPHOCYTES NFR BLD: 10.3 %
MAGNESIUM SERPL-MCNC: 1.7 MG/DL (ref 1.8–2.4)
MANUAL DIF COMMENT CSF-IMP: ABNORMAL
MCH RBC QN AUTO: 29.9 PG (ref 26–34)
MCHC RBC AUTO-ENTMCNC: 34.2 G/DL (ref 31–36)
MCV RBC AUTO: 87.4 FL (ref 80–100)
MENING+ENC PNL CSF NAA+NON-PROBE: NORMAL
MONOCYTES # BLD: 1 K/UL (ref 0–1.3)
MONOCYTES NFR BLD: 5.7 %
NEUTROPHILS # BLD: 14.7 K/UL (ref 1.7–7.7)
NEUTROPHILS NFR BLD: 83.4 %
NITRITE UR QL STRIP.AUTO: NEGATIVE
NT-PROBNP SERPL-MCNC: 118 PG/ML (ref 0–124)
NUC CELL # FLD MANUAL: 3 /CUMM (ref 0–5)
PH UR STRIP.AUTO: 6 [PH] (ref 5–8)
PHOSPHATE SERPL-MCNC: 2 MG/DL (ref 2.5–4.9)
PLATELET # BLD AUTO: 236 K/UL (ref 135–450)
PMV BLD AUTO: 7.8 FL (ref 5–10.5)
POTASSIUM SERPL-SCNC: 3.6 MMOL/L (ref 3.5–5.1)
PROCALCITONIN SERPL IA-MCNC: 0.21 NG/ML (ref 0–0.15)
PROT SERPL-MCNC: 7.9 G/DL (ref 6.4–8.2)
PROT UR STRIP.AUTO-MCNC: 30 MG/DL
RBC # BLD AUTO: 4.42 M/UL (ref 4.2–5.9)
RBC # FLD MANUAL: 11 /CUMM
RBC CLUMPS #/AREA URNS AUTO: 28 /HPF (ref 0–4)
REPORT: NORMAL
SARS-COV-2 RDRP RESP QL NAA+PROBE: NOT DETECTED
SODIUM SERPL-SCNC: 137 MMOL/L (ref 136–145)
SP GR UR STRIP.AUTO: >=1.03 (ref 1–1.03)
TROPONIN, HIGH SENSITIVITY: 11 NG/L (ref 0–22)
TUBE # CSF: ABNORMAL
UA DIPSTICK W REFLEX MICRO PNL UR: YES
URN SPEC COLLECT METH UR: ABNORMAL
UROBILINOGEN UR STRIP-ACNC: 1 E.U./DL
WBC # BLD AUTO: 17.7 K/UL (ref 4–11)
WBC #/AREA URNS AUTO: 5 /HPF (ref 0–5)

## 2024-04-26 PROCEDURE — 87635 SARS-COV-2 COVID-19 AMP PRB: CPT

## 2024-04-26 PROCEDURE — 83735 ASSAY OF MAGNESIUM: CPT

## 2024-04-26 PROCEDURE — 36415 COLL VENOUS BLD VENIPUNCTURE: CPT

## 2024-04-26 PROCEDURE — 2580000003 HC RX 258: Performed by: EMERGENCY MEDICINE

## 2024-04-26 PROCEDURE — 87205 SMEAR GRAM STAIN: CPT

## 2024-04-26 PROCEDURE — 1200000000 HC SEMI PRIVATE

## 2024-04-26 PROCEDURE — 89050 BODY FLUID CELL COUNT: CPT

## 2024-04-26 PROCEDURE — 87070 CULTURE OTHR SPECIMN AEROBIC: CPT

## 2024-04-26 PROCEDURE — 71260 CT THORAX DX C+: CPT

## 2024-04-26 PROCEDURE — 96361 HYDRATE IV INFUSION ADD-ON: CPT

## 2024-04-26 PROCEDURE — 99285 EMERGENCY DEPT VISIT HI MDM: CPT

## 2024-04-26 PROCEDURE — 84484 ASSAY OF TROPONIN QUANT: CPT

## 2024-04-26 PROCEDURE — 6360000002 HC RX W HCPCS: Performed by: EMERGENCY MEDICINE

## 2024-04-26 PROCEDURE — 6360000002 HC RX W HCPCS: Performed by: INTERNAL MEDICINE

## 2024-04-26 PROCEDURE — 6370000000 HC RX 637 (ALT 250 FOR IP): Performed by: EMERGENCY MEDICINE

## 2024-04-26 PROCEDURE — 009U3ZX DRAINAGE OF SPINAL CANAL, PERCUTANEOUS APPROACH, DIAGNOSTIC: ICD-10-PCS | Performed by: EMERGENCY MEDICINE

## 2024-04-26 PROCEDURE — 87804 INFLUENZA ASSAY W/OPTIC: CPT

## 2024-04-26 PROCEDURE — 87641 MR-STAPH DNA AMP PROBE: CPT

## 2024-04-26 PROCEDURE — 93005 ELECTROCARDIOGRAM TRACING: CPT | Performed by: EMERGENCY MEDICINE

## 2024-04-26 PROCEDURE — 85025 COMPLETE CBC W/AUTO DIFF WBC: CPT

## 2024-04-26 PROCEDURE — 81001 URINALYSIS AUTO W/SCOPE: CPT

## 2024-04-26 PROCEDURE — 96375 TX/PRO/DX INJ NEW DRUG ADDON: CPT

## 2024-04-26 PROCEDURE — 2580000003 HC RX 258: Performed by: INTERNAL MEDICINE

## 2024-04-26 PROCEDURE — 80053 COMPREHEN METABOLIC PANEL: CPT

## 2024-04-26 PROCEDURE — 87483 CNS DNA AMP PROBE TYPE 12-25: CPT

## 2024-04-26 PROCEDURE — 96365 THER/PROPH/DIAG IV INF INIT: CPT

## 2024-04-26 PROCEDURE — 2500000003 HC RX 250 WO HCPCS: Performed by: EMERGENCY MEDICINE

## 2024-04-26 PROCEDURE — 6360000004 HC RX CONTRAST MEDICATION: Performed by: EMERGENCY MEDICINE

## 2024-04-26 PROCEDURE — 84145 PROCALCITONIN (PCT): CPT

## 2024-04-26 PROCEDURE — 6360000002 HC RX W HCPCS

## 2024-04-26 PROCEDURE — 6370000000 HC RX 637 (ALT 250 FOR IP): Performed by: INTERNAL MEDICINE

## 2024-04-26 PROCEDURE — 2060000000 HC ICU INTERMEDIATE R&B

## 2024-04-26 PROCEDURE — 82945 GLUCOSE OTHER FLUID: CPT

## 2024-04-26 PROCEDURE — 93010 ELECTROCARDIOGRAM REPORT: CPT | Performed by: INTERNAL MEDICINE

## 2024-04-26 PROCEDURE — 84100 ASSAY OF PHOSPHORUS: CPT

## 2024-04-26 PROCEDURE — 62270 DX LMBR SPI PNXR: CPT

## 2024-04-26 PROCEDURE — 71046 X-RAY EXAM CHEST 2 VIEWS: CPT

## 2024-04-26 PROCEDURE — 70450 CT HEAD/BRAIN W/O DYE: CPT

## 2024-04-26 PROCEDURE — 83880 ASSAY OF NATRIURETIC PEPTIDE: CPT

## 2024-04-26 RX ORDER — ONDANSETRON 2 MG/ML
4 INJECTION INTRAMUSCULAR; INTRAVENOUS EVERY 6 HOURS PRN
Status: DISCONTINUED | OUTPATIENT
Start: 2024-04-26 | End: 2024-04-30 | Stop reason: HOSPADM

## 2024-04-26 RX ORDER — POTASSIUM CHLORIDE 20 MEQ/1
40 TABLET, EXTENDED RELEASE ORAL PRN
Status: DISCONTINUED | OUTPATIENT
Start: 2024-04-26 | End: 2024-04-30 | Stop reason: HOSPADM

## 2024-04-26 RX ORDER — PREDNISONE 20 MG/1
20 TABLET ORAL DAILY
Status: DISCONTINUED | OUTPATIENT
Start: 2024-04-26 | End: 2024-04-30 | Stop reason: HOSPADM

## 2024-04-26 RX ORDER — ENOXAPARIN SODIUM 100 MG/ML
40 INJECTION SUBCUTANEOUS NIGHTLY
Status: DISCONTINUED | OUTPATIENT
Start: 2024-04-26 | End: 2024-04-30 | Stop reason: HOSPADM

## 2024-04-26 RX ORDER — PROCHLORPERAZINE EDISYLATE 5 MG/ML
10 INJECTION INTRAMUSCULAR; INTRAVENOUS ONCE
Status: COMPLETED | OUTPATIENT
Start: 2024-04-26 | End: 2024-04-26

## 2024-04-26 RX ORDER — DIPHENHYDRAMINE HYDROCHLORIDE 50 MG/ML
25 INJECTION INTRAMUSCULAR; INTRAVENOUS ONCE
Status: COMPLETED | OUTPATIENT
Start: 2024-04-26 | End: 2024-04-26

## 2024-04-26 RX ORDER — LIDOCAINE HYDROCHLORIDE 20 MG/ML
5 INJECTION, SOLUTION INFILTRATION; PERINEURAL ONCE
Status: COMPLETED | OUTPATIENT
Start: 2024-04-26 | End: 2024-04-26

## 2024-04-26 RX ORDER — SODIUM CHLORIDE 9 MG/ML
INJECTION, SOLUTION INTRAVENOUS CONTINUOUS
Status: DISCONTINUED | OUTPATIENT
Start: 2024-04-26 | End: 2024-04-30

## 2024-04-26 RX ORDER — ACETAMINOPHEN 650 MG/1
650 SUPPOSITORY RECTAL EVERY 6 HOURS PRN
Status: DISCONTINUED | OUTPATIENT
Start: 2024-04-26 | End: 2024-04-30 | Stop reason: HOSPADM

## 2024-04-26 RX ORDER — ACETAMINOPHEN 325 MG/1
650 TABLET ORAL EVERY 6 HOURS PRN
Status: DISCONTINUED | OUTPATIENT
Start: 2024-04-26 | End: 2024-04-30 | Stop reason: HOSPADM

## 2024-04-26 RX ORDER — MIDAZOLAM HYDROCHLORIDE 1 MG/ML
INJECTION INTRAMUSCULAR; INTRAVENOUS
Status: COMPLETED
Start: 2024-04-26 | End: 2024-04-26

## 2024-04-26 RX ORDER — DROPERIDOL 2.5 MG/ML
0.62 INJECTION, SOLUTION INTRAMUSCULAR; INTRAVENOUS ONCE
Status: COMPLETED | OUTPATIENT
Start: 2024-04-26 | End: 2024-04-26

## 2024-04-26 RX ORDER — AMLODIPINE BESYLATE 5 MG/1
10 TABLET ORAL NIGHTLY
Status: DISCONTINUED | OUTPATIENT
Start: 2024-04-26 | End: 2024-04-30 | Stop reason: HOSPADM

## 2024-04-26 RX ORDER — SODIUM CHLORIDE 0.9 % (FLUSH) 0.9 %
5-40 SYRINGE (ML) INJECTION EVERY 12 HOURS SCHEDULED
Status: DISCONTINUED | OUTPATIENT
Start: 2024-04-26 | End: 2024-04-30 | Stop reason: HOSPADM

## 2024-04-26 RX ORDER — VANCOMYCIN 2 G/400ML
2000 INJECTION, SOLUTION INTRAVENOUS ONCE
Status: COMPLETED | OUTPATIENT
Start: 2024-04-26 | End: 2024-04-26

## 2024-04-26 RX ORDER — POLYETHYLENE GLYCOL 3350 17 G/17G
17 POWDER, FOR SOLUTION ORAL DAILY PRN
Status: DISCONTINUED | OUTPATIENT
Start: 2024-04-26 | End: 2024-04-30 | Stop reason: HOSPADM

## 2024-04-26 RX ORDER — PANTOPRAZOLE SODIUM 40 MG/1
40 TABLET, DELAYED RELEASE ORAL
Status: DISCONTINUED | OUTPATIENT
Start: 2024-04-27 | End: 2024-04-30 | Stop reason: HOSPADM

## 2024-04-26 RX ORDER — FLUTICASONE PROPIONATE 50 MCG
1 SPRAY, SUSPENSION (ML) NASAL DAILY PRN
Status: DISCONTINUED | OUTPATIENT
Start: 2024-04-26 | End: 2024-04-30 | Stop reason: HOSPADM

## 2024-04-26 RX ORDER — MIDAZOLAM HYDROCHLORIDE 1 MG/ML
2 INJECTION INTRAMUSCULAR; INTRAVENOUS ONCE
Status: COMPLETED | OUTPATIENT
Start: 2024-04-26 | End: 2024-04-26

## 2024-04-26 RX ORDER — SODIUM CHLORIDE 9 MG/ML
INJECTION, SOLUTION INTRAVENOUS PRN
Status: DISCONTINUED | OUTPATIENT
Start: 2024-04-26 | End: 2024-04-30 | Stop reason: HOSPADM

## 2024-04-26 RX ORDER — MIDAZOLAM HYDROCHLORIDE 1 MG/ML
4 INJECTION INTRAMUSCULAR; INTRAVENOUS ONCE
Status: COMPLETED | OUTPATIENT
Start: 2024-04-26 | End: 2024-04-26

## 2024-04-26 RX ORDER — ALLOPURINOL 100 MG/1
100 TABLET ORAL DAILY
Status: DISCONTINUED | OUTPATIENT
Start: 2024-04-27 | End: 2024-04-30 | Stop reason: HOSPADM

## 2024-04-26 RX ORDER — MAGNESIUM SULFATE IN WATER 40 MG/ML
2000 INJECTION, SOLUTION INTRAVENOUS PRN
Status: DISCONTINUED | OUTPATIENT
Start: 2024-04-26 | End: 2024-04-30 | Stop reason: HOSPADM

## 2024-04-26 RX ORDER — 0.9 % SODIUM CHLORIDE 0.9 %
1000 INTRAVENOUS SOLUTION INTRAVENOUS ONCE
Status: COMPLETED | OUTPATIENT
Start: 2024-04-26 | End: 2024-04-26

## 2024-04-26 RX ORDER — DROPERIDOL 2.5 MG/ML
1.25 INJECTION, SOLUTION INTRAMUSCULAR; INTRAVENOUS ONCE
Status: COMPLETED | OUTPATIENT
Start: 2024-04-26 | End: 2024-04-26

## 2024-04-26 RX ORDER — SODIUM CHLORIDE 0.9 % (FLUSH) 0.9 %
5-40 SYRINGE (ML) INJECTION PRN
Status: DISCONTINUED | OUTPATIENT
Start: 2024-04-26 | End: 2024-04-30 | Stop reason: HOSPADM

## 2024-04-26 RX ORDER — ONDANSETRON 4 MG/1
4 TABLET, ORALLY DISINTEGRATING ORAL EVERY 8 HOURS PRN
Status: DISCONTINUED | OUTPATIENT
Start: 2024-04-26 | End: 2024-04-30 | Stop reason: HOSPADM

## 2024-04-26 RX ORDER — POTASSIUM CHLORIDE 7.45 MG/ML
10 INJECTION INTRAVENOUS PRN
Status: DISCONTINUED | OUTPATIENT
Start: 2024-04-26 | End: 2024-04-30 | Stop reason: HOSPADM

## 2024-04-26 RX ORDER — MAGNESIUM SULFATE 1 G/100ML
1000 INJECTION INTRAVENOUS ONCE
Status: COMPLETED | OUTPATIENT
Start: 2024-04-26 | End: 2024-04-26

## 2024-04-26 RX ORDER — VILAZODONE HYDROCHLORIDE 20 MG/1
20 TABLET ORAL DAILY
COMMUNITY

## 2024-04-26 RX ORDER — MIDAZOLAM HYDROCHLORIDE 1 MG/ML
INJECTION INTRAMUSCULAR; INTRAVENOUS
Status: DISCONTINUED
Start: 2024-04-26 | End: 2024-04-26

## 2024-04-26 RX ADMIN — DROPERIDOL 0.62 MG: 2.5 INJECTION, SOLUTION INTRAMUSCULAR; INTRAVENOUS at 11:05

## 2024-04-26 RX ADMIN — IOPAMIDOL 75 ML: 755 INJECTION, SOLUTION INTRAVENOUS at 10:50

## 2024-04-26 RX ADMIN — PREDNISONE 20 MG: 20 TABLET ORAL at 20:48

## 2024-04-26 RX ADMIN — LIDOCAINE HYDROCHLORIDE 5 ML: 20 INJECTION, SOLUTION INFILTRATION; PERINEURAL at 16:05

## 2024-04-26 RX ADMIN — DIBASIC SODIUM PHOSPHATE, MONOBASIC POTASSIUM PHOSPHATE AND MONOBASIC SODIUM PHOSPHATE 2 TABLET: 852; 155; 130 TABLET ORAL at 10:59

## 2024-04-26 RX ADMIN — ACYCLOVIR SODIUM 950 MG: 50 INJECTION, SOLUTION INTRAVENOUS at 20:55

## 2024-04-26 RX ADMIN — MIDAZOLAM HYDROCHLORIDE 2 MG: 1 INJECTION INTRAMUSCULAR; INTRAVENOUS at 15:45

## 2024-04-26 RX ADMIN — CEFTRIAXONE SODIUM 2000 MG: 2 INJECTION, POWDER, FOR SOLUTION INTRAMUSCULAR; INTRAVENOUS at 15:11

## 2024-04-26 RX ADMIN — MAGNESIUM SULFATE HEPTAHYDRATE 1000 MG: 1 INJECTION, SOLUTION INTRAVENOUS at 10:28

## 2024-04-26 RX ADMIN — SODIUM CHLORIDE: 9 INJECTION, SOLUTION INTRAVENOUS at 19:40

## 2024-04-26 RX ADMIN — AMLODIPINE BESYLATE 10 MG: 5 TABLET ORAL at 20:48

## 2024-04-26 RX ADMIN — DROPERIDOL 1.25 MG: 2.5 INJECTION, SOLUTION INTRAMUSCULAR; INTRAVENOUS at 15:55

## 2024-04-26 RX ADMIN — PROCHLORPERAZINE EDISYLATE 10 MG: 5 INJECTION INTRAMUSCULAR; INTRAVENOUS at 08:22

## 2024-04-26 RX ADMIN — ACETAMINOPHEN 650 MG: 325 TABLET ORAL at 19:09

## 2024-04-26 RX ADMIN — MIDAZOLAM 2 MG: 1 INJECTION INTRAMUSCULAR; INTRAVENOUS at 15:45

## 2024-04-26 RX ADMIN — DIPHENHYDRAMINE HYDROCHLORIDE 25 MG: 50 INJECTION, SOLUTION INTRAMUSCULAR; INTRAVENOUS at 08:21

## 2024-04-26 RX ADMIN — ENOXAPARIN SODIUM 40 MG: 100 INJECTION SUBCUTANEOUS at 20:48

## 2024-04-26 RX ADMIN — SODIUM CHLORIDE 1000 ML: 9 INJECTION, SOLUTION INTRAVENOUS at 08:26

## 2024-04-26 RX ADMIN — MIDAZOLAM 2 MG: 1 INJECTION INTRAMUSCULAR; INTRAVENOUS at 16:00

## 2024-04-26 RX ADMIN — VANCOMYCIN 2000 MG: 2 INJECTION, SOLUTION INTRAVENOUS at 16:02

## 2024-04-26 RX ADMIN — MIDAZOLAM 4 MG: 1 INJECTION INTRAMUSCULAR; INTRAVENOUS at 15:00

## 2024-04-26 ASSESSMENT — PAIN DESCRIPTION - DESCRIPTORS
DESCRIPTORS: ACHING
DESCRIPTORS: DISCOMFORT

## 2024-04-26 ASSESSMENT — PAIN SCALES - GENERAL
PAINLEVEL_OUTOF10: 8
PAINLEVEL_OUTOF10: 9
PAINLEVEL_OUTOF10: 5
PAINLEVEL_OUTOF10: 8

## 2024-04-26 ASSESSMENT — PAIN DESCRIPTION - LOCATION
LOCATION: HEAD
LOCATION: GENERALIZED

## 2024-04-26 ASSESSMENT — PAIN - FUNCTIONAL ASSESSMENT
PAIN_FUNCTIONAL_ASSESSMENT: 0-10
PAIN_FUNCTIONAL_ASSESSMENT: 0-10

## 2024-04-26 ASSESSMENT — LIFESTYLE VARIABLES
HOW OFTEN DO YOU HAVE A DRINK CONTAINING ALCOHOL: 2-4 TIMES A MONTH
HOW MANY STANDARD DRINKS CONTAINING ALCOHOL DO YOU HAVE ON A TYPICAL DAY: 3 OR 4

## 2024-04-26 NOTE — PROGRESS NOTES
Pharmacy Medication Reconciliation Note     List of medications patient is currently taking is complete.    Source of information:   1. Conversation with patient   2. EMR    Notes regarding home medications:   Spoke with patient who reports he is taking three meds regularly - states amlodipine and prednisone but unsure of name of the last one.     States he gets all of his meds from the [facility] in [place]. Spoke to staff at [facility] - states they have not filled any meds for patient since November of 2023. Prednisone has not been filled since August. Removed old meds from home med list - others marked not taking.      Elana Burgos, PharmD  4/26/2024 5:24 PM

## 2024-04-26 NOTE — PROGRESS NOTES
Patient transferred to room 5266 from ED. Patient oriented to room and call light. Blood pressure 158/100 manually. Attending notified. Patient said he has not had his daily medications for a few days. Patient does not have any questions or concerns at this time. Call light within reach.

## 2024-04-26 NOTE — CONSULTS
Clinical Pharmacy Note  Vancomycin Consult    Jarrett Orozco is a 53 y.o. male ordered vancomycin for empiric coverage CNS infection; consult received from Dr. Johns to manage therapy. Also receiving ceftriaxone and acyclovir.    Allergies:  Patient has no known allergies.     Temp max:  Temp (24hrs), Av °F (37.2 °C), Min:98.9 °F (37.2 °C), Max:99 °F (37.2 °C)      Recent Labs     24  0820   WBC 17.7*       Recent Labs     24  0820   BUN 13   CREATININE 0.9       No intake or output data in the 24 hours ending 24 1939    Culture Results:  pending    Ht Readings from Last 1 Encounters:   24 1.829 m (6')        Wt Readings from Last 1 Encounters:   24 96.2 kg (212 lb 1.3 oz)         Estimated Creatinine Clearance: 115 mL/min (based on SCr of 0.9 mg/dL).    Assessment/Plan:  Day # 1 of vancomycin.  Vancomycin 2000 mg x 1 dose given in ED  Vancomycin 1500 mg IV every 12 hours to follow  Goal -600  Predicted  (24-48 hour AUC)  Will obtain level after 3 total doses (0600 on )    Thank you for the consult.   Lilo Delaney, PharmD, BCPS  2024 7:45 PM

## 2024-04-26 NOTE — H&P
Hospital Medicine History & Physical      PCP: Michael Swenson MD    Date of Admission: 4/26/2024    Date of Service: Pt seen/examined on 04/26/2024 and Admitted to Inpatient with expected LOS greater than two midnights due to medical therapy.       History Of Present Illness:      53 y.o. male who presented to [facility] with headache autophobia episodes of fever and chills nausea vomiting and episodes of diarrhea not associated with any abdominal pain denies chest pain or shortness of breath, no black stool, denies any neck pain, worsening for the last 3 days overall, no recent sick contact, patient is on chronic steroids, emergency department found to have leukocytosis, fever, LP done, started on IV antibiotics discussed with EDMD agree with plan to admit for further management and treatment    Past Medical History:          Diagnosis Date    Asthma     Bell's palsy 09/05/2012    Chronic pain disorder 10/29/2010    DVT (deep venous thrombosis) ([facility])     Gouty arthritis 02/15/2011    Hyperlipidemia     Hypertension     Ischemic stroke ([facility])     Long term (current) use of systemic steroids 01/20/2016    Mood disorder 07/13/2011    Osteoarthritis     Sarcoidosis 03/07/2010       Past Surgical History:          Procedure Laterality Date    CYSTOSCOPY N/A 4/8/2019    CYSTOSCOPY performed by Panchito Womack MD at [facility] OR    ENDOSCOPY, COLON, DIAGNOSTIC         Medications Prior to Admission:      Prior to Admission medications    Medication Sig Start Date End Date Taking? Authorizing Provider   fluticasone (FLONASE) 50 MCG/ACT nasal spray 1 spray by Each Nostril route daily 12/1/22   Nupur Hernández APRN - CNP   ibuprofen (ADVIL;MOTRIN) 600 MG tablet Take 1 tablet by mouth 3 times daily as needed for Pain With meals 11/22/22 11/27/22  Elliot Bowie APRN - CNP   ondansetron (ZOFRAN ODT) 4 MG disintegrating tablet Take 1-2 tablets by mouth every 12 hours as needed for Nausea May Sub regular tablet

## 2024-04-26 NOTE — ED NOTES
Patient resting comfortably, respirations easy, unlabored. Patient in no acute distress. Denies needs at this time. Call light within reach, bed in lowest position, side rails up x 2. Pt updated on plan of care.

## 2024-04-27 PROBLEM — R31.9 HEMATURIA: Status: ACTIVE | Noted: 2024-04-27

## 2024-04-27 PROBLEM — M79.10 MYALGIA: Status: ACTIVE | Noted: 2024-04-27

## 2024-04-27 PROBLEM — E78.2 MIXED HYPERLIPIDEMIA: Status: ACTIVE | Noted: 2024-04-27

## 2024-04-27 PROBLEM — R51.9 ACUTE NONINTRACTABLE HEADACHE: Status: ACTIVE | Noted: 2024-04-27

## 2024-04-27 PROBLEM — R53.81 MALAISE: Status: ACTIVE | Noted: 2024-04-27

## 2024-04-27 PROBLEM — Z86.69 HISTORY OF BELL'S PALSY: Status: ACTIVE | Noted: 2024-04-27

## 2024-04-27 PROBLEM — E83.39 HYPOPHOSPHATEMIA: Status: ACTIVE | Noted: 2024-04-27

## 2024-04-27 PROBLEM — Z87.39 HISTORY OF GOUT: Status: ACTIVE | Noted: 2024-04-27

## 2024-04-27 PROBLEM — R51.9 NONINTRACTABLE HEADACHE: Status: ACTIVE | Noted: 2024-04-27

## 2024-04-27 PROBLEM — Z11.4 SCREENING FOR HIV (HUMAN IMMUNODEFICIENCY VIRUS): Status: ACTIVE | Noted: 2024-04-27

## 2024-04-27 PROBLEM — E66.3 OVERWEIGHT (BMI 25.0-29.9): Status: ACTIVE | Noted: 2024-04-27

## 2024-04-27 PROBLEM — E83.42 HYPOMAGNESEMIA: Status: ACTIVE | Noted: 2024-04-27

## 2024-04-27 LAB
ALBUMIN SERPL-MCNC: 3.7 G/DL (ref 3.4–5)
ALBUMIN/GLOB SERPL: 0.9 {RATIO} (ref 1.1–2.2)
ALP SERPL-CCNC: 101 U/L (ref 40–129)
ALT SERPL-CCNC: 13 U/L (ref 10–40)
AMPHETAMINES UR QL SCN>1000 NG/ML: ABNORMAL
ANION GAP SERPL CALCULATED.3IONS-SCNC: 14 MMOL/L (ref 3–16)
AST SERPL-CCNC: 20 U/L (ref 15–37)
BARBITURATES UR QL SCN>200 NG/ML: ABNORMAL
BASOPHILS # BLD: 0 K/UL (ref 0–0.2)
BASOPHILS NFR BLD: 0.3 %
BENZODIAZ UR QL SCN>200 NG/ML: POSITIVE
BILIRUB SERPL-MCNC: <0.2 MG/DL (ref 0–1)
BUN SERPL-MCNC: 10 MG/DL (ref 7–20)
C DIFF TOX A+B STL QL IA: NORMAL
CALCIUM SERPL-MCNC: 8.7 MG/DL (ref 8.3–10.6)
CANNABINOIDS UR QL SCN>50 NG/ML: POSITIVE
CHLORIDE SERPL-SCNC: 105 MMOL/L (ref 99–110)
CO2 SERPL-SCNC: 21 MMOL/L (ref 21–32)
COCAINE UR QL SCN: ABNORMAL
CREAT SERPL-MCNC: 0.8 MG/DL (ref 0.9–1.3)
DEPRECATED RDW RBC AUTO: 14.9 % (ref 12.4–15.4)
DRUG SCREEN COMMENT UR-IMP: ABNORMAL
EOSINOPHIL # BLD: 0 K/UL (ref 0–0.6)
EOSINOPHIL NFR BLD: 0 %
FENTANYL SCREEN, URINE: ABNORMAL
GFR SERPLBLD CREATININE-BSD FMLA CKD-EPI: >90 ML/MIN/{1.73_M2}
GLUCOSE SERPL-MCNC: 136 MG/DL (ref 70–99)
HCT VFR BLD AUTO: 36.3 % (ref 40.5–52.5)
HGB BLD-MCNC: 12.5 G/DL (ref 13.5–17.5)
LYMPHOCYTES # BLD: 1.2 K/UL (ref 1–5.1)
LYMPHOCYTES NFR BLD: 9.1 %
MCH RBC QN AUTO: 30.4 PG (ref 26–34)
MCHC RBC AUTO-ENTMCNC: 34.6 G/DL (ref 31–36)
MCV RBC AUTO: 88.1 FL (ref 80–100)
METHADONE UR QL SCN>300 NG/ML: ABNORMAL
MONOCYTES # BLD: 0.4 K/UL (ref 0–1.3)
MONOCYTES NFR BLD: 3.1 %
NEUTROPHILS # BLD: 11.5 K/UL (ref 1.7–7.7)
NEUTROPHILS NFR BLD: 87.5 %
OPIATES UR QL SCN>300 NG/ML: ABNORMAL
OXYCODONE UR QL SCN: ABNORMAL
PCP UR QL SCN>25 NG/ML: ABNORMAL
PH UR STRIP: 7 [PH]
PLATELET # BLD AUTO: 226 K/UL (ref 135–450)
PMV BLD AUTO: 7.5 FL (ref 5–10.5)
POTASSIUM SERPL-SCNC: 3.6 MMOL/L (ref 3.5–5.1)
PROCALCITONIN SERPL IA-MCNC: 0.19 NG/ML (ref 0–0.15)
PROT SERPL-MCNC: 7.6 G/DL (ref 6.4–8.2)
RBC # BLD AUTO: 4.12 M/UL (ref 4.2–5.9)
REASON FOR REJECTION: NORMAL
SODIUM SERPL-SCNC: 140 MMOL/L (ref 136–145)
WBC # BLD AUTO: 13.2 K/UL (ref 4–11)

## 2024-04-27 PROCEDURE — 85025 COMPLETE CBC W/AUTO DIFF WBC: CPT

## 2024-04-27 PROCEDURE — 36415 COLL VENOUS BLD VENIPUNCTURE: CPT

## 2024-04-27 PROCEDURE — 6360000002 HC RX W HCPCS: Performed by: EMERGENCY MEDICINE

## 2024-04-27 PROCEDURE — 87040 BLOOD CULTURE FOR BACTERIA: CPT

## 2024-04-27 PROCEDURE — 87205 SMEAR GRAM STAIN: CPT

## 2024-04-27 PROCEDURE — 87324 CLOSTRIDIUM AG IA: CPT

## 2024-04-27 PROCEDURE — 1200000000 HC SEMI PRIVATE

## 2024-04-27 PROCEDURE — 87506 IADNA-DNA/RNA PROBE TQ 6-11: CPT

## 2024-04-27 PROCEDURE — 83993 ASSAY FOR CALPROTECTIN FECAL: CPT

## 2024-04-27 PROCEDURE — 87449 NOS EACH ORGANISM AG IA: CPT

## 2024-04-27 PROCEDURE — 84145 PROCALCITONIN (PCT): CPT

## 2024-04-27 PROCEDURE — 80053 COMPREHEN METABOLIC PANEL: CPT

## 2024-04-27 PROCEDURE — 2580000003 HC RX 258: Performed by: EMERGENCY MEDICINE

## 2024-04-27 PROCEDURE — 94760 N-INVAS EAR/PLS OXIMETRY 1: CPT

## 2024-04-27 PROCEDURE — 2060000000 HC ICU INTERMEDIATE R&B

## 2024-04-27 PROCEDURE — 99223 1ST HOSP IP/OBS HIGH 75: CPT | Performed by: INTERNAL MEDICINE

## 2024-04-27 PROCEDURE — 2580000003 HC RX 258: Performed by: INTERNAL MEDICINE

## 2024-04-27 PROCEDURE — 80307 DRUG TEST PRSMV CHEM ANLYZR: CPT

## 2024-04-27 PROCEDURE — 6370000000 HC RX 637 (ALT 250 FOR IP): Performed by: INTERNAL MEDICINE

## 2024-04-27 PROCEDURE — 6360000002 HC RX W HCPCS: Performed by: INTERNAL MEDICINE

## 2024-04-27 PROCEDURE — 87086 URINE CULTURE/COLONY COUNT: CPT

## 2024-04-27 RX ORDER — TRAMADOL HYDROCHLORIDE 50 MG/1
25 TABLET ORAL EVERY 6 HOURS PRN
Status: DISCONTINUED | OUTPATIENT
Start: 2024-04-27 | End: 2024-04-30 | Stop reason: HOSPADM

## 2024-04-27 RX ADMIN — SODIUM CHLORIDE: 9 INJECTION, SOLUTION INTRAVENOUS at 17:30

## 2024-04-27 RX ADMIN — ACETAMINOPHEN 650 MG: 325 TABLET ORAL at 10:52

## 2024-04-27 RX ADMIN — ENOXAPARIN SODIUM 40 MG: 100 INJECTION SUBCUTANEOUS at 21:31

## 2024-04-27 RX ADMIN — SODIUM CHLORIDE: 9 INJECTION, SOLUTION INTRAVENOUS at 18:59

## 2024-04-27 RX ADMIN — AMLODIPINE BESYLATE 10 MG: 5 TABLET ORAL at 21:31

## 2024-04-27 RX ADMIN — PREDNISONE 20 MG: 20 TABLET ORAL at 10:43

## 2024-04-27 RX ADMIN — TRAMADOL HYDROCHLORIDE 25 MG: 50 TABLET ORAL at 15:54

## 2024-04-27 RX ADMIN — PANTOPRAZOLE SODIUM 40 MG: 40 TABLET, DELAYED RELEASE ORAL at 05:19

## 2024-04-27 RX ADMIN — CEFTRIAXONE SODIUM 2000 MG: 2 INJECTION, POWDER, FOR SOLUTION INTRAMUSCULAR; INTRAVENOUS at 03:15

## 2024-04-27 RX ADMIN — TRAMADOL HYDROCHLORIDE 25 MG: 50 TABLET ORAL at 21:30

## 2024-04-27 RX ADMIN — VANCOMYCIN HYDROCHLORIDE 1500 MG: 1.5 INJECTION, POWDER, LYOPHILIZED, FOR SOLUTION INTRAVENOUS at 10:46

## 2024-04-27 RX ADMIN — ALLOPURINOL 100 MG: 100 TABLET ORAL at 10:43

## 2024-04-27 RX ADMIN — VANCOMYCIN HYDROCHLORIDE 1500 MG: 1.5 INJECTION, POWDER, LYOPHILIZED, FOR SOLUTION INTRAVENOUS at 21:42

## 2024-04-27 RX ADMIN — ACYCLOVIR SODIUM 950 MG: 50 INJECTION, SOLUTION INTRAVENOUS at 05:21

## 2024-04-27 RX ADMIN — SODIUM CHLORIDE: 9 INJECTION, SOLUTION INTRAVENOUS at 05:27

## 2024-04-27 ASSESSMENT — PAIN DESCRIPTION - LOCATION
LOCATION: HEAD
LOCATION_2: ABDOMEN
LOCATION: HEAD
LOCATION: HEAD;ABDOMEN

## 2024-04-27 ASSESSMENT — PAIN DESCRIPTION - INTENSITY
RATING_2: 8
RATING_2: 4

## 2024-04-27 ASSESSMENT — PAIN DESCRIPTION - DESCRIPTORS
DESCRIPTORS_2: ACHING;TENDER
DESCRIPTORS: DISCOMFORT
DESCRIPTORS: ACHING

## 2024-04-27 ASSESSMENT — PAIN DESCRIPTION - ORIENTATION: ORIENTATION_2: RIGHT;LEFT;UPPER

## 2024-04-27 ASSESSMENT — PAIN SCALES - GENERAL
PAINLEVEL_OUTOF10: 8
PAINLEVEL_OUTOF10: 6
PAINLEVEL_OUTOF10: 6
PAINLEVEL_OUTOF10: 8

## 2024-04-27 ASSESSMENT — PAIN - FUNCTIONAL ASSESSMENT
PAIN_FUNCTIONAL_ASSESSMENT: PREVENTS OR INTERFERES SOME ACTIVE ACTIVITIES AND ADLS
PAIN_FUNCTIONAL_ASSESSMENT_SITE2: PREVENTS OR INTERFERES SOME ACTIVE ACTIVITIES AND ADLS

## 2024-04-27 NOTE — ACP (ADVANCE CARE PLANNING)
Advance Care Planning     Advance Care Planning Inpatient Note  [facility] Department    Today's Date: 4/27/2024  Unit: [id number] 5N PROGRESSIVE CARE    Received request from IDT Member.  Upon review of chart and communication with care team, patient's decision making abilities are not in question.. Patient was/were present in the room during visit.    Goals of ACP Conversation:  Discuss advance care planning documents    Health Care Decision Makers:       Primary Decision Maker: Lee Orozco - Child - [phone]    Primary Decision Maker: Jarrett Orozco Jr. - Child - [phone]  Summary:  Documented Next of Kin, per patient report    Advance Care Planning Documents (Patient Wishes):  None     Assessment:  Patient awake and alert, lying in bed.  Patient is not  and has 10 children, youngest is 16 years old. Patient identified two of his sons to add as emergency contacts and next of kin healthcare decision makers until he completes the Healthcare Power of . Patient acknowledges that all of his adult children are his healthcare decision makers.    Interventions:  Provided education on documents for clarity and greater understanding  Discussed and provided education on state decision maker hierarchy  Encouraged ongoing ACP conversation with future decision makers and loved ones  Reviewed but did not complete ACP document    Care Preferences Communicated:   No    Outcomes/Plan:  ACP Discussion: Completed    Electronically signed by GÓMEZ Rahman on 4/27/2024 at 1:09 PM

## 2024-04-27 NOTE — PROGRESS NOTES
V2.0    [facility] [place]      Name:  Jarrett Orozco /Age/Sex: 1970  (53 y.o. male)   MRN & CSN:  [id number] & [id number] Encounter Date/Time: 2024 8:14 AM EDT   Location:  [id number] PCP: Michael Swenson MD     Attending:Horacio Johns MD       Hospital Day: 2    Assessment and Recommendations   Jarrett Orozco is a 53 y.o. male who presents with Severe sepsis (HCC)      Plan:          Sepsis, with headache blurry vision nonspecific symptoms of diarrhea nausea and vomiting, patient is on chronic steroids for sarcoidosis, possible central nervous system infection status post LP, IV ceftriaxone and vancomycin for now, ID consulted and acyclovir added.    Sarcoidosis continue steroids  Nausea vomiting antiemetics  Diarrhea appears functional, if no improvement consider GI consult  Generalized weakness due to above        Diet ADULT DIET; Clear Liquid   DVT Prophylaxis [] Lovenox, []  Heparin, [] SCDs, [] Ambulation,  [] Eliquis, [] Xarelto  [] Coumadin   Code Status Full Code             Personally reviewed Lab Studies and Imaging     Discussed management of the case with nursing staff  Telemetry strip reviewed by myself no ST elevation        Drugs that require monitoring for toxicity include vancomycin and the method of monitoring was creatinine    Medical Decision Making:  The following items were considered in medical decision making:  Discussion of patient care with other providers  Reviewed clinical lab tests  Reviewed radiology tests  Reviewed other diagnostic tests/interventions  Independent review of radiologic images  Microbiology cultures and other micro tests reviewed      Subjective:     Chief Complaint: Headache nausea photophobia    Jarrett Orozco is a 53 y.o. male who presents with headache nausea photophobia, still having some headache, photophobia no fever or chills this morning      Review of Systems:      Pertinent positives and negatives discussed in HPI    Objective:  free air, free fluid or intraperitoneal inflammatory change.  There is no adenopathy. Bones/Soft Tissues: There is no acute fracture or aggressive osseous lesion     No acute thoracic/abdominopelvic abnormality.     XR CHEST (2 VW)    Result Date: 4/26/2024  EXAMINATION: TWO XRAY VIEWS OF THE CHEST 4/26/2024 7:47 am COMPARISON: 12/01/2022 radiograph HISTORY: ORDERING SYSTEM PROVIDED HISTORY: Productive cough, malaise TECHNOLOGIST PROVIDED HISTORY: Reason for exam:->Productive cough, malaise FINDINGS: The heart, mediastinum and pulmonary vascularity are normal.  Lungs are well-expanded and clear. No skeletal abnormalities are present in the chest.     No significant findings in the chest.       CBC:   Recent Labs     04/26/24  0820 04/27/24  0540   WBC 17.7* 13.2*   HGB 13.2* 12.5*    226     BMP:    Recent Labs     04/26/24  0820 04/27/24  0540    140   K 3.6 3.6    105   CO2 18* 21   BUN 13 10   CREATININE 0.9 0.8*   GLUCOSE 114* 136*     Hepatic:   Recent Labs     04/26/24  0820 04/27/24  0540   AST 19 20   ALT 13 13   BILITOT 0.3 <0.2   ALKPHOS 100 101     Lipids:   Lab Results   Component Value Date/Time    CHOL 239 08/18/2017 09:52 AM    HDL 78 08/18/2017 09:52 AM    TRIG 119 08/18/2017 09:52 AM     Hemoglobin A1C:   Lab Results   Component Value Date/Time    LABA1C 6.1 08/18/2017 09:52 AM     TSH:   Lab Results   Component Value Date/Time    TSH 0.96 04/21/2016 10:04 AM     Troponin: No results found for: \"TROPONINT\"  Lactic Acid: No results for input(s): \"LACTA\" in the last 72 hours.  BNP:   Recent Labs     04/26/24  0820   PROBNP 118     UA:  Lab Results   Component Value Date/Time    NITRU Negative 04/26/2024 08:20 AM    COLORU Yellow 04/26/2024 08:20 AM    PHUR 6.0 04/26/2024 08:20 AM    LABCAST 0-1 Coarsely granular, 0-1 Finely granular 07/04/2012 11:45 AM    WBCUA 5 04/26/2024 08:20 AM    RBCUA 28 04/26/2024 08:20 AM    MUCUS Present 12/01/2022 10:39 AM    YEAST Present 12/28/2020  06:35 PM    BACTERIA None Seen 04/26/2024 08:20 AM    CLARITYU Clear 04/26/2024 08:20 AM    SPECGRAV >=1.030 04/26/2024 08:20 AM    LEUKOCYTESUR TRACE 04/26/2024 08:20 AM    UROBILINOGEN 1.0 04/26/2024 08:20 AM    BILIRUBINUR Negative 04/26/2024 08:20 AM    BILIRUBINUR NEGATIVE-by confirmatory method 10/18/2011 07:12 PM    BLOODU LARGE 04/26/2024 08:20 AM    GLUCOSEU Negative 04/26/2024 08:20 AM    GLUCOSEU NEGATIVE 10/18/2011 07:12 PM    KETUA 15 04/26/2024 08:20 AM    AMORPHOUS 3+ 12/28/2020 06:35 PM     Urine Cultures:   Lab Results   Component Value Date/Time    LABURIN  12/20/2019 08:58 PM     <10,000 CFU/ml mixed skin/urogenital uli. No further workup     Blood Cultures:   Lab Results   Component Value Date/Time    BC No Growth after 4 days of incubation. 04/25/2021 04:10 PM     Lab Results   Component Value Date/Time    BLOODCULT2 No Growth after 4 days of incubation. 04/25/2021 05:52 PM     Organism:   Lab Results   Component Value Date/Time    ORG Beta Strep Group C 12/01/2022 10:39 AM         Electronically signed by Horacio Johns MD on 4/27/2024 at 8:14 AM  Comment: Please note this report has been produced using speech recognition software and may contain errors related to that system including errors in grammar, punctuation, and spelling, as well as words and phrases that may be inappropriate. If there are any questions or concerns, please feel free to contact the dictating provider for clarification.

## 2024-04-27 NOTE — ED PROVIDER NOTES
Name: Jarrett Orozco : 1970 MRN: [id number] Date of Service: 2024    Initial VS: BP: (!) 171/102, Temp: 98.9 °F (37.2 °C), Pulse: (!) 115, Respirations: 20, SpO2: 99 %     CC: headache    HPI: this patient is a 53 y.o. male presenting to the ED from home. Mr. Orozco tells me that for about the last 72 hours he has been struggling with a worsening, diffuse, now excruciating headache. He notes that he cannot tolerate any exposure to bright light as it makes his head pain unbearable. Along with the headache he has felt intermittent sensations of fevers and chills. He has also been feeling fatigued and generally weak. He has had a sporadic cough productive of foul-appearing mucus. He has also been quite nauseous as of late and his appetite has been very poor. As these symptoms did not seem to be improving with time he came here this morning to be evaluated. He has not appreciated other changes from his usual stat of health and he denies other current complaints. Of note, he has previously been diagnosed with sarcoidosis and he is currently taking prednisone 20 mg/day for treatment of such.  _____________________________________________________________________    Past Medical History:   Diagnosis Date    Asthma     Bell's palsy 2012    Chronic pain disorder 10/29/2010    DVT (deep venous thrombosis) (HCC)     Gouty arthritis 02/15/2011    Hyperlipidemia     Hypertension     Ischemic stroke (HCC)     Long term (current) use of systemic steroids 2016    Mood disorder 2011    Osteoarthritis     Sarcoidosis 2010      Past Surgical History:   Procedure Laterality Date    CYSTOSCOPY N/A 2019    CYSTOSCOPY performed by Panchito Womack MD at [facility]    ENDOSCOPY, COLON, DIAGNOSTIC       Family History   Problem Relation Age of Onset    Heart Disease Mother     Diabetes Mother     Diabetes Father     Cancer Father      Social History     Tobacco Use    Smoking status: Former     Current     Comments:          Lumbar Puncture    Date/Time: 4/26/2024 3:40 PM    Performed by: Javi Javed MD  Authorized by: Horacio Johns MD    Consent:     Consent obtained:  Verbal and written    Consent given by:  Patient    Risks, benefits, and alternatives were discussed: yes      Risks discussed:  Repeat procedure, pain, infection, nerve damage, headache and bleeding    Alternatives discussed:  Delayed treatment, alternative treatment and observation  Universal protocol:     Patient identity confirmed:  Verbally with patient, arm band and hospital-assigned identification number  Pre-procedure details:     Procedure purpose:  Diagnostic    Preparation: Patient was prepped and draped in usual sterile fashion    Anesthesia:     Anesthesia method:  Local infiltration    Local anesthetic:  Lidocaine 2% w/o epi  Procedure details:     Lumbar space:  L4-L5 interspace    Patient position:  R lateral decubitus    Needle gauge:  22    Needle type:  Spinal needle - Quincke tip    Ultrasound guidance: no      Number of attempts:  2    Fluid appearance:  Clear    Tubes of fluid:  1    Total volume (ml):  1  Post-procedure details:     Procedure completion:  Tolerated well, no immediate complications    _____________________________________________________________________    MEDICAL DECISION MAKING & PLANS:    I reviewed the patient's recent and/or pertinent records, current medications, triage notes, allergies, and vital signs.    Most recent VS:  BP: (!) 165/113,Temp: 99 °F (37.2 °C), Pulse: (!) 106, Respirations: 20, SpO2: 97 %     Treatments:  Medications   acyclovir (ZOVIRAX) 950 mg in sodium chloride 0.9 % 250 mL IVPB (950 mg IntraVENous New Bag 4/26/24 2055)   sodium chloride 0.9 % bolus 1,000 mL (0 mLs IntraVENous Stopped 4/26/24 1000)   prochlorperazine (COMPAZINE) injection 10 mg (10 mg IntraVENous Given 4/26/24 0822)   diphenhydrAMINE (BENADRYL) injection 25 mg (25 mg IntraVENous Given 4/26/24 0821)    magnesium sulfate 1000 mg in dextrose 5% 100 mL IVPB (0 mg IntraVENous Stopped 4/26/24 1147)   phosphorus (K PHOS NEUTRAL) 2 tablet (2 tablets Oral Given 4/26/24 1059)   iopamidol (ISOVUE-370) 76 % injection 75 mL (75 mLs IntraVENous Given 4/26/24 1050)   droPERidol (INAPSINE) injection 0.625 mg (0.625 mg IntraVENous Given 4/26/24 1105)   lidocaine 2 % injection 5 mL (5 mLs IntraDERmal Given 4/26/24 1605)   cefTRIAXone (ROCEPHIN) 2,000 mg in sodium chloride 0.9 % 50 mL IVPB (mini-bag) (0 mg IntraVENous Stopped 4/26/24 1602)   midazolam (VERSED) injection 4 mg (4 mg IntraVENous Given 4/26/24 1500)   vancomycin (VANCOCIN) 2000 mg in 400 mL IVPB (0 mg IntraVENous Stopped 4/26/24 1842)   droPERidol (INAPSINE) injection 1.25 mg (1.25 mg IntraVENous Given 4/26/24 1555)   midazolam (VERSED) injection 2 mg (2 mg IntraVENous Given 4/26/24 1600)   midazolam (VERSED) injection 2 mg (2 mg IntraVENous Given 4/26/24 1545)     Disposition:  Mr. Orozco was noted to be ill-appearing on evaluation in the ED today. Aside from prominent photophobia appreciated during H&P and minimal tachycardia his exam findings are non-focal. Lab testing showed significant leukocytosis but initial lab testing and CT imaging of his head, chest, abdomen, and pelvis did not reveal any definitive etiology of his presenting issues. Given this we discussed the R/B/A of proceeding with LP and CSF analysis in order to assess for meningitis and/or encephalitis and he wished to proceed with this intervention. LP was performed at bedside. He had an abundance of difficulty resting in a still position for this procedure so he was treated with doses of midazolam until adequate anxiolysis and restfulness was achieved in order to safely proceed with the procedure. CSF was obtained and sent to the lab; its analysis is currently pending. He is receiving ceftriaxone, vancomycin, and acyclovir empirically in the interim. Discussed exam findings, test results thus

## 2024-04-27 NOTE — PLAN OF CARE
Problem: Discharge Planning  Goal: Discharge to home or other facility with appropriate resources  4/27/2024 1246 by Stephany Yates RN  Outcome: Progressing     Problem: Pain  Goal: Verbalizes/displays adequate comfort level or baseline comfort level  4/27/2024 1246 by Stephany Yaets RN  Outcome: Progressing     Problem: Safety - Adult  Goal: Free from fall injury  4/27/2024 1246 by Stephany Yates RN  Outcome: Progressing

## 2024-04-27 NOTE — PROGRESS NOTES
4 Eyes Skin Assessment     The patient is being assess for  Admission    I agree that 2 RN's have performed a thorough Head to Toe Skin Assessment on the patient. ALL assessment sites listed below have been assessed on admission.         Areas assessed by both nurses:   [x]   Head, Face, and Ears   [x]   Shoulders, Back, and Chest  [x]   Arms, Elbows, and Hands   [x]   Coccyx, Sacrum, and Ischum  [x]   Legs, Feet, and Heels        Does the Patient have Skin Breakdown?  No         Fabián Prevention initiated:  No   Wound Care Orders initiated:  No      [facility] nurse consulted for Pressure Injury (Stage 3,4, Unstageable, DTI, NWPT, and Complex wounds):  No      Nurse 1 eSignature: Electronically signed by Kristine Parker RN on 4/27/24 at 12:38 AM EDT    **SHARE this note so that the co-signing nurse is able to place an eSignature**    Nurse 2 eSignature: {Esignature:[id number]}

## 2024-04-27 NOTE — CONSULTS
13.2.  Platelet count was 236,000.  Serum creatinine was 0.8    His urinalysis done yesterday showed large amount of blood in the urine.  Leukocyte estrase was trace and nitrate was negative.    The patient had a lumbar puncture done yesterday.  It showed a normal glucose of 67.  CSF was clear with 3 white cells, 11 red cells.  CSF meningitis PCR panel was negative    Procalcitonin today was 0.19    I reviewed the images of her CT scan of his head done without contrast and CT scan of chest abdomen pelvis done with IV contrast earlier today independently interpreted the CT findings.  No acute intracranial abnormalities noted.  No obvious pneumonia or acute abnormalities on abdominal pelvis CT scan.    The patient has history of sarcoidosis and is on prednisone 20 mg daily and has been compromised    The patient has been started on IV vancomycin, IV ceftriaxone 2 g every 12 hours and IV acyclovir by primary team yesterday      Plan:     Diagnostic Workup:    Unfortunately, looks like no blood cultures have been sent yet.  Please send 2 sets of blood cultures to be done stat  Send urine culture to be done stat  Follow-up on respiratory viral PCR panel  Send sputum for culture  Urine Legionella and pneumococcal antigens  Follow-up on nasal MRSA probe  Will order baseline HIV screen  Urine tox screen  Stool GI PCR Panel  Continue to follow fever curve, WBC count and blood cultures  Follow up on liverand renal functions closely    Antimicrobials:    I will stop IV acyclovir today.  No evidence of meningitis on the CSF studies  Reduce IV ceftriaxone dose to non-CNS dose of 2 g every 24 hours  Will continue empiric IV vancomycin for now for empiric gram-positive coverage  Check Vancomycin trough before the 5th dose.   Target vancomycin trough level of 15-20  mg/L or vancomycin area under curve (AUC) of 400-600 mg*h/L by Bayesian modeling method.  If dosing vancomycin based on trough levels, keep vancomycin trough below 20    Abdominal:      General: Bowel sounds are normal.      Palpations: Abdomen is soft.      Tenderness: There is no abdominal tenderness. There is no guarding or rebound.   Musculoskeletal:         General: No swelling, tenderness or deformity. Normal range of motion.      Cervical back: Normal range of motion and neck supple.      Right lower leg: No edema.      Left lower leg: No edema.   Lymphadenopathy:      Cervical: No cervical adenopathy.   Skin:     General: Skin is warm and dry.      Coloration: Skin is not jaundiced.      Findings: No bruising, erythema or rash.   Neurological:      General: No focal deficit present.      Mental Status: He is alert and oriented to person, place, and time. Mental status is at baseline.      Motor: No abnormal muscle tone.   Psychiatric:         Mood and Affect: Mood normal.         Behavior: Behavior normal.           Lines and drains: All vascular access sites are healthy with no local erythema, discharge or tenderness.      Intake and output:     I/O last 3 completed shifts:  In: 912.5 [P.O.:100; I.V.:812.5]  Out: -     Lab Data:   All available labs were reviewed by me today.     CBC:   Recent Labs     04/26/24  0820 04/27/24  0540   WBC 17.7* 13.2*   RBC 4.42 4.12*   HGB 13.2* 12.5*   HCT 38.6* 36.3*    226   MCV 87.4 88.1   MCH 29.9 30.4   MCHC 34.2 34.6   RDW 15.1 14.9        BMP:  Recent Labs     04/26/24  0820 04/27/24  0540    140   K 3.6 3.6    105   CO2 18* 21   BUN 13 10   CREATININE 0.9 0.8*   CALCIUM 9.6 8.7   GLUCOSE 114* 136*        Hepatic FunctionPanel:   Lab Results   Component Value Date/Time    ALKPHOS 101 04/27/2024 05:40 AM    ALT 13 04/27/2024 05:40 AM    AST 20 04/27/2024 05:40 AM    PROT 7.6 04/27/2024 05:40 AM    PROT 7.0 11/02/2012 11:19 AM    BILITOT <0.2 04/27/2024 05:40 AM    BILIDIR <0.2 12/01/2022 10:39 AM    IBILI see below 12/01/2022 10:39 AM       CPK: No results found for: \"CKTOTAL\"  ESR:   Lab Results   Component Value

## 2024-04-27 NOTE — PROGRESS NOTES
Patient complaining of headache since this morning. Tried tylenol first. Patient had some relief but now the headache is now back. Messaged attending to see if patient could have anything stronger. PRN tramadol order placed.

## 2024-04-27 NOTE — PROGRESS NOTES
Patient stated last evening during admission assessment that he has had diarrhea for two days. Patient was placed in Contact Plus isolation to rule-out C-Diff. Watery stool specimen was sent to the lab. Result is negative for C-Diff.

## 2024-04-28 LAB
ALBUMIN SERPL-MCNC: 3.8 G/DL (ref 3.4–5)
ALBUMIN/GLOB SERPL: 1 {RATIO} (ref 1.1–2.2)
ALP SERPL-CCNC: 111 U/L (ref 40–129)
ALT SERPL-CCNC: 20 U/L (ref 10–40)
ANION GAP SERPL CALCULATED.3IONS-SCNC: 10 MMOL/L (ref 3–16)
AST SERPL-CCNC: 24 U/L (ref 15–37)
BASOPHILS # BLD: 0.1 K/UL (ref 0–0.2)
BASOPHILS NFR BLD: 0.8 %
BILIRUB SERPL-MCNC: <0.2 MG/DL (ref 0–1)
BUN SERPL-MCNC: 10 MG/DL (ref 7–20)
CALCIUM SERPL-MCNC: 9 MG/DL (ref 8.3–10.6)
CHLORIDE SERPL-SCNC: 104 MMOL/L (ref 99–110)
CO2 SERPL-SCNC: 24 MMOL/L (ref 21–32)
CREAT SERPL-MCNC: 0.6 MG/DL (ref 0.9–1.3)
DEPRECATED RDW RBC AUTO: 15.3 % (ref 12.4–15.4)
EOSINOPHIL # BLD: 0 K/UL (ref 0–0.6)
EOSINOPHIL NFR BLD: 0.5 %
GFR SERPLBLD CREATININE-BSD FMLA CKD-EPI: >90 ML/MIN/{1.73_M2}
GLUCOSE SERPL-MCNC: 103 MG/DL (ref 70–99)
HCT VFR BLD AUTO: 37.8 % (ref 40.5–52.5)
HGB BLD-MCNC: 12.9 G/DL (ref 13.5–17.5)
LEGIONELLA AG UR QL: NORMAL
LYMPHOCYTES # BLD: 3.4 K/UL (ref 1–5.1)
LYMPHOCYTES NFR BLD: 34.2 %
MCH RBC QN AUTO: 29.9 PG (ref 26–34)
MCHC RBC AUTO-ENTMCNC: 34 G/DL (ref 31–36)
MCV RBC AUTO: 88 FL (ref 80–100)
MONOCYTES # BLD: 0.7 K/UL (ref 0–1.3)
MONOCYTES NFR BLD: 7.1 %
MRSA DNA SPEC QL NAA+PROBE: NORMAL
NEUTROPHILS # BLD: 5.6 K/UL (ref 1.7–7.7)
NEUTROPHILS NFR BLD: 57.4 %
PLATELET # BLD AUTO: 249 K/UL (ref 135–450)
PMV BLD AUTO: 7.6 FL (ref 5–10.5)
POTASSIUM SERPL-SCNC: 3.4 MMOL/L (ref 3.5–5.1)
PROT SERPL-MCNC: 7.5 G/DL (ref 6.4–8.2)
RBC # BLD AUTO: 4.3 M/UL (ref 4.2–5.9)
S PNEUM AG UR QL: NORMAL
SARS-COV-2 N GENE RESP QL NAA+PROBE: NOT DETECTED
SODIUM SERPL-SCNC: 138 MMOL/L (ref 136–145)
VANCOMYCIN SERPL-MCNC: 14.6 UG/ML
WBC # BLD AUTO: 9.8 K/UL (ref 4–11)

## 2024-04-28 PROCEDURE — 6370000000 HC RX 637 (ALT 250 FOR IP): Performed by: INTERNAL MEDICINE

## 2024-04-28 PROCEDURE — 2060000000 HC ICU INTERMEDIATE R&B

## 2024-04-28 PROCEDURE — 6370000000 HC RX 637 (ALT 250 FOR IP): Performed by: NURSE PRACTITIONER

## 2024-04-28 PROCEDURE — 2500000003 HC RX 250 WO HCPCS: Performed by: NURSE PRACTITIONER

## 2024-04-28 PROCEDURE — 2580000003 HC RX 258: Performed by: INTERNAL MEDICINE

## 2024-04-28 PROCEDURE — 6360000002 HC RX W HCPCS: Performed by: INTERNAL MEDICINE

## 2024-04-28 PROCEDURE — 85025 COMPLETE CBC W/AUTO DIFF WBC: CPT

## 2024-04-28 PROCEDURE — 1200000000 HC SEMI PRIVATE

## 2024-04-28 PROCEDURE — 36415 COLL VENOUS BLD VENIPUNCTURE: CPT

## 2024-04-28 PROCEDURE — 2580000003 HC RX 258: Performed by: NURSE PRACTITIONER

## 2024-04-28 PROCEDURE — 80053 COMPREHEN METABOLIC PANEL: CPT

## 2024-04-28 PROCEDURE — 94760 N-INVAS EAR/PLS OXIMETRY 1: CPT

## 2024-04-28 PROCEDURE — 80202 ASSAY OF VANCOMYCIN: CPT

## 2024-04-28 PROCEDURE — 87040 BLOOD CULTURE FOR BACTERIA: CPT

## 2024-04-28 RX ORDER — GABAPENTIN 100 MG/1
100 CAPSULE ORAL 3 TIMES DAILY
Status: DISCONTINUED | OUTPATIENT
Start: 2024-04-29 | End: 2024-04-30 | Stop reason: HOSPADM

## 2024-04-28 RX ORDER — INDOMETHACIN 25 MG/1
50 CAPSULE ORAL 3 TIMES DAILY PRN
Status: DISCONTINUED | OUTPATIENT
Start: 2024-04-28 | End: 2024-04-30 | Stop reason: HOSPADM

## 2024-04-28 RX ORDER — GABAPENTIN 100 MG/1
200 CAPSULE ORAL ONCE
Status: COMPLETED | OUTPATIENT
Start: 2024-04-28 | End: 2024-04-28

## 2024-04-28 RX ADMIN — TRAMADOL HYDROCHLORIDE 25 MG: 50 TABLET ORAL at 18:17

## 2024-04-28 RX ADMIN — GABAPENTIN 200 MG: 100 CAPSULE ORAL at 22:06

## 2024-04-28 RX ADMIN — VANCOMYCIN HYDROCHLORIDE 1500 MG: 1.5 INJECTION, POWDER, LYOPHILIZED, FOR SOLUTION INTRAVENOUS at 21:59

## 2024-04-28 RX ADMIN — TRAMADOL HYDROCHLORIDE 25 MG: 50 TABLET ORAL at 04:32

## 2024-04-28 RX ADMIN — PREDNISONE 20 MG: 20 TABLET ORAL at 08:35

## 2024-04-28 RX ADMIN — INDOMETHACIN 50 MG: 25 CAPSULE ORAL at 20:35

## 2024-04-28 RX ADMIN — VANCOMYCIN HYDROCHLORIDE 1500 MG: 1.5 INJECTION, POWDER, LYOPHILIZED, FOR SOLUTION INTRAVENOUS at 08:34

## 2024-04-28 RX ADMIN — AMLODIPINE BESYLATE 10 MG: 5 TABLET ORAL at 20:28

## 2024-04-28 RX ADMIN — CEFTRIAXONE SODIUM 2000 MG: 2 INJECTION, POWDER, FOR SOLUTION INTRAMUSCULAR; INTRAVENOUS at 23:35

## 2024-04-28 RX ADMIN — SODIUM CHLORIDE, PRESERVATIVE FREE 10 ML: 5 INJECTION INTRAVENOUS at 20:40

## 2024-04-28 RX ADMIN — TRAMADOL HYDROCHLORIDE 25 MG: 50 TABLET ORAL at 23:49

## 2024-04-28 RX ADMIN — POTASSIUM CHLORIDE 40 MEQ: 1500 TABLET, EXTENDED RELEASE ORAL at 08:35

## 2024-04-28 RX ADMIN — ACETAMINOPHEN 650 MG: 325 TABLET ORAL at 20:27

## 2024-04-28 RX ADMIN — ENOXAPARIN SODIUM 40 MG: 100 INJECTION SUBCUTANEOUS at 20:39

## 2024-04-28 RX ADMIN — CEFTRIAXONE SODIUM 2000 MG: 2 INJECTION, POWDER, FOR SOLUTION INTRAMUSCULAR; INTRAVENOUS at 00:37

## 2024-04-28 RX ADMIN — ONDANSETRON 4 MG: 2 INJECTION INTRAMUSCULAR; INTRAVENOUS at 20:28

## 2024-04-28 RX ADMIN — TRAMADOL HYDROCHLORIDE 25 MG: 50 TABLET ORAL at 11:20

## 2024-04-28 RX ADMIN — PANTOPRAZOLE SODIUM 40 MG: 40 TABLET, DELAYED RELEASE ORAL at 04:34

## 2024-04-28 RX ADMIN — ALLOPURINOL 100 MG: 100 TABLET ORAL at 08:35

## 2024-04-28 RX ADMIN — VALPROATE SODIUM 500 MG: 100 INJECTION, SOLUTION INTRAVENOUS at 13:04

## 2024-04-28 RX ADMIN — VALPROATE SODIUM 500 MG: 100 INJECTION, SOLUTION INTRAVENOUS at 20:27

## 2024-04-28 ASSESSMENT — PAIN DESCRIPTION - LOCATION
LOCATION: HEAD
LOCATION: HEAD
LOCATION: HEAD;ABDOMEN
LOCATION: HEAD
LOCATION: ABDOMEN;HEAD

## 2024-04-28 ASSESSMENT — PAIN DESCRIPTION - DESCRIPTORS
DESCRIPTORS: ACHING

## 2024-04-28 ASSESSMENT — PAIN SCALES - GENERAL
PAINLEVEL_OUTOF10: 10
PAINLEVEL_OUTOF10: 7
PAINLEVEL_OUTOF10: 4
PAINLEVEL_OUTOF10: 9
PAINLEVEL_OUTOF10: 7

## 2024-04-28 ASSESSMENT — PAIN - FUNCTIONAL ASSESSMENT: PAIN_FUNCTIONAL_ASSESSMENT: PREVENTS OR INTERFERES SOME ACTIVE ACTIVITIES AND ADLS

## 2024-04-28 NOTE — CONSULTS
NEUROLOGY CONSULT NOTE  Reason for Consult: Horacio Lind MD asked me to see Jarrett Orozco in consultation for evaluation of:  Intractable headache    Chief complaint: \"This headache won't go away.\"    HISTORY OF PRESENT ILLNESS:  HPI was gathered from the patient at the bedside as well as EMR review.    Jarrett Orozco is a 53 y.o. male with a PMH that includes asthma, Bell's palsy, DVT, HLD, HTN, ischemic stroke with so residual deficits, sarcoidosis, and mood disorder.    Patient presented to the ED  2 days ago with a diffuse, \"excruciating\" headache associated with photophobia.  Patient states that on Tuesday (now 5 day ago) he had a sore throat.  He took some medicine for it and the next day it was gone, but he started having a headache that has not resolved.  He also reports diarrhea and a feeling of fever/chills PTA.    The headache is associated with photophobia, blurry vision and loss of balance.  He event fell and states he is scared to get out of bed because he is afraid he will fall again. He does not have a spinning sensation.  He has received Tramadol for his headache and states that it helps for about an hour or so.    Patient does admit to having occasional headaches, but never any like this.  He states he is usually able to sleep for an hour or two and then the headache goes away.    BP in /102.  .  Afebrile.  HCT non-acute.    There was no family at the bedside at the time of evaluation.    MEDICAL HISTORY:  Past Medical History:   Diagnosis Date    Asthma     Bell's palsy 09/05/2012    Chronic pain disorder 10/29/2010    DVT (deep venous thrombosis) (HCC)     Gouty arthritis 02/15/2011    Hyperlipidemia     Hypertension     Ischemic stroke (HCC)     Long term (current) use of systemic steroids 01/20/2016    Mood disorder 07/13/2011    Osteoarthritis     Sarcoidosis 03/07/2010     Past Surgical History:   Procedure Laterality Date    CYSTOSCOPY N/A 4/8/2019    CYSTOSCOPY  NGTD  Glucose: 67  Protein: Pending.  ME Panel:  Not detected  Cell Count:  RBC:  11, WBC:  3    Blood Cx 1: In process  Blood Cx 2:  In process    Influenza A Antigen :  Negative (4/26/24)  Rapid Influenza B Ag:  Negative (4/26/24)  COVID-19:  Not detected (4/26/24)    STUDIES:    MRI Brain w/wo contrast:  Pending.    CT Head w/o contrast 4/26/24.  Independently reviewed images.  IMPRESSION:  No acute intracranial abnormality.    IMPRESSION:  1. Intractable headache with migraine features  2. Hx of stroke without residual deficits  3. Leukocytosis, improving  4. Positive UA  5. HTN  6. Marijuana use    Jarrett rOozco is a 53 y.o. male who presented with 5 days of intractable headache associated with dizziness and photophobia. Neurological exam is reassuring.  LP non-revealing.  Status migrainous?  Headache related to hypertensive urgency?    RECOMMENDATIONS:  -MRI brain w/w/o contrast (ordered)  -Depakote IVPB 500 mg Q8H x3 doses (ordered)  -Indomethacin 50 mg PRN TID (ordered)  -MRI Lumbar spine w/o contrast (ordered)  -MRA Head w/contrast (ordered)  -Labs:  CSF Protein (ordered)  -Avoid triptans for headache treatment d/t hx of stroke  -Will discuss with neurology attending physician, Dr. Renetta Grace.  See attestation for additional recommendations.      Brandy Woodruff, [facility]    A copy of this note was provided for Horacio Lind MD

## 2024-04-28 NOTE — PROGRESS NOTES
Clinical Pharmacy Note  Vancomycin Consult    Jarrett Orozco is a 53 y.o. male ordered vancomycin for empiric coverage CNS infection; consult received from Dr. Jonhs to manage therapy. Also receiving ceftriaxone and acyclovir.    Allergies:  Patient has no known allergies.     Temp max:  Temp (24hrs), Av.7 °F (36.5 °C), Min:97.3 °F (36.3 °C), Max:98 °F (36.7 °C)      Recent Labs     24  0820 24  0540 24  0607   WBC 17.7* 13.2* 9.8         Recent Labs     24  0820 24  0540 24  0607   BUN 13 10 10   CREATININE 0.9 0.8* 0.6*           Intake/Output Summary (Last 24 hours) at 2024 0700  Last data filed at 2024 0439  Gross per 24 hour   Intake 1601.03 ml   Output 1265 ml   Net 336.03 ml       Culture Results:  pending    Ht Readings from Last 1 Encounters:   24 1.829 m (6')        Wt Readings from Last 1 Encounters:   24 96.2 kg (212 lb 1.3 oz)         Estimated Creatinine Clearance: 173 mL/min (A) (based on SCr of 0.6 mg/dL (L)).    Assessment/Plan:  Day # 3 of vancomycin.  Current dose  Vancomycin 1500 mg IV every 12 hours    Vancomycin level 14.6mg/L 8.5 hours post dose.   Goal -600  Predicted AUCss 531  Continue current dose  Will continue to monitor SrCr and order level as indicated  Thank you for the consult.   Abdirashid Reina [facility], 2024 7:03 AM

## 2024-04-28 NOTE — PROGRESS NOTES
V2.0    [facility] Progress Note      Name:  Jarrett Orozco /Age/Sex: 1970  (53 y.o. male)   MRN & CSN:  [id number] & [id number] Encounter Date/Time: 2024 8:37 AM EDT   Location:  [id number] PCP: Michael Swenson MD     Attending:Horacio Johns MD       Hospital Day: 3    Assessment and Recommendations   Jarrett Orozco is a 53 y.o. male who presents with Severe sepsis (HCC)      Plan:     Sepsis, with headache blurry vision nonspecific symptoms of diarrhea nausea and vomiting, patient is on chronic steroids for sarcoidosis, ID consulted on admission started on ceftriaxone acyclovir and vancomycin, CSF fluid negative ID discontinued acyclovir adjusted ceftriaxone dose.    Sarcoidosis continue steroids  Nausea vomiting antiemetics, continue to be an issue associated with abdominal pain CT scan on admission negative, pantoprazole  Headache, ongoing, no cord tenderness, consulted neurology for further recommendations  Diarrhea appears functional, C. difficile negative GI consulted  Generalized weakness due to above    Diet ADULT DIET; Regular   DVT Prophylaxis [] Lovenox, []  Heparin, [] SCDs, [] Ambulation,  [] Eliquis, [] Xarelto  [] Coumadin   Code Status Full Code             Personally reviewed Lab Studies and Imaging     Discussed management of the case with nursing staff        Medical Decision Making:  The following items were considered in medical decision making:  Discussion of patient care with other providers  Reviewed clinical lab tests  Reviewed radiology tests  Reviewed other diagnostic tests/interventions  Independent review of radiologic images  Microbiology cultures and other micro tests reviewed      Subjective:     Chief Complaint: Headache photophobia, fever    Jarrett Orozco is a 53 y.o. male who presents with headache photophobia fever nausea vomiting and diarrhea still having some headache no fever at this time      Review of Systems:      Pertinent positives and negatives  discussed in HPI    Objective:     Intake/Output Summary (Last 24 hours) at 4/28/2024 0837  Last data filed at 4/28/2024 0439  Gross per 24 hour   Intake 1601.03 ml   Output 1265 ml   Net 336.03 ml      Vitals:   Vitals:    04/28/24 0329 04/28/24 0432 04/28/24 0451 04/28/24 0732   BP: (!) 148/103   (!) 159/102   Pulse: 81   93   Resp: 13 16  13   Temp: 97.4 °F (36.3 °C)   97.6 °F (36.4 °C)   TempSrc: Oral   Oral   SpO2: 99%   91%   Weight:   96.2 kg (212 lb 1.3 oz)    Height:             Physical Exam:      Physical Exam Performed:    BP (!) 159/102   Pulse 93   Temp 97.6 °F (36.4 °C) (Oral)   Resp 13   Ht 1.829 m (6')   Wt 96.2 kg (212 lb 1.3 oz)   SpO2 91%   BMI 28.76 kg/m²     General appearance: No apparent distress  Respiratory:  Normal respiratory effort. Clear to auscultation, bilaterally without Rales/Wheezes/Rhonchi.  Cardiovascular: Regular rate and rhythm with normal S1/S2 without murmurs, rubs or gallops.  Abdomen: Soft, non-tender, non-distended with normal bowel sounds.  Musculoskeletal: No clubbing, cyanosis   Neurologic: No focal weakness  Psychiatric: Alert and oriented  Capillary Refill: Brisk, 3 seconds, normal   Peripheral Pulses: +2 palpable, equal bilaterally       Medications:   Medications:    cefTRIAXone (ROCEPHIN) IV  2,000 mg IntraVENous Q24H    sodium chloride flush  5-40 mL IntraVENous 2 times per day    enoxaparin  40 mg SubCUTAneous Nightly    allopurinol  100 mg Oral Daily    amLODIPine  10 mg Oral Nightly    pantoprazole  40 mg Oral QAM AC    predniSONE  20 mg Oral Daily    vancomycin (VANCOCIN) intermittent dosing (placeholder)   Other RX Placeholder    vancomycin  1,500 mg IntraVENous Q12H      Infusions:    sodium chloride      sodium chloride 100 mL/hr at 04/27/24 1859     PRN Meds: traMADol, 25 mg, Q6H PRN  sodium chloride flush, 5-40 mL, PRN  sodium chloride, , PRN  potassium chloride, 40 mEq, PRN   Or  potassium alternative oral replacement, 40 mEq, PRN   Or  potassium  4/26/2024  EXAMINATION: CT OF THE CHEST, ABDOMEN, AND PELVIS WITH CONTRAST 4/26/2024 10:07 am TECHNIQUE: CT of the chest, abdomen and pelvis was performed with the administration of intravenous contrast. Multiplanar reformatted images are provided for review. Automated exposure control, iterative reconstruction, and/or weight based adjustment of the mA/kV was utilized to reduce the radiation dose to as low as reasonably achievable. COMPARISON: 04/25/2021 HISTORY: ORDERING SYSTEM PROVIDED HISTORY: Shortness of breath, productive cough, malaise, immunosuppressed on medications for sarcoidosis, concern for occult pneumonia not visualized on x-ray imaging; Also with generalized abdominal pain, N/V/D x 5 days TECHNOLOGIST PROVIDED HISTORY: Reason for exam:->Shortness of breath, productive cough, malaise, immunosuppressed on medications for sarcoidosis, concern for occult pneumonia not visualized on x-ray imaging; Also with generalized abdominal pain, N/V/D x 5 days Additional Contrast?->None Decision Support Exception - unselect if not a suspected or confirmed emergency medical condition->Emergency Medical Condition (MA) Reason for Exam: Shortness of breath, productive cough, malaise, immunosuppressed on medications for sarcoidosis, concern for occult pneumonia not visualized on x-ray imaging; Also with generalized abdominal pain, N/V/D x 5 days Relevant Medical/Surgical History: none FINDINGS: Chest: Mediastinum: The central airways are patent.  There is no hilar or mediastinal adenopathy. Lungs/pleura: There is no consolidation, pneumothorax or effusion. Soft Tissues/Bones: There is no acute fracture or aggressive osseous lesion. Abdomen/Pelvis: Organs: The liver, pancreas, spleen, kidneys and adrenals are unremarkable. GI/Bowel: There is no bowel dilatation, wall thickening or obstruction.  The appendix is normal.  Diverticular changes are scattered throughout the left hemicolon. Pelvis: The bladder and prostate are

## 2024-04-28 NOTE — PLAN OF CARE
Problem: Discharge Planning  Goal: Discharge to home or other facility with appropriate resources  4/28/2024 0927 by Stephany Yates RN  Outcome: Progressing     Problem: Pain  Goal: Verbalizes/displays adequate comfort level or baseline comfort level  4/28/2024 0927 by Stephany Yates RN  Outcome: Progressing     Problem: Safety - Adult  Goal: Free from fall injury  4/28/2024 0927 by Stephany Yates RN  Outcome: Progressing

## 2024-04-28 NOTE — CONSULTS
Gastroenterology Consult Note      Patient: Jarrett Orozco  : 1970  Acct#:      Date:  2024    Subjective:       History of Present Illness  Patient is a 53 y.o.  male with past medical history significant for pulmonary sarcoidosis on chronic corticosteroids, osteoarthritis, hypertension, hyperlipidemia, previous CVA, gouty arthritis, history of DVT, chronic pain disorder, Bell's palsy, asthma, who is seen in consult for nausea, vomiting, diarrhea.  The patient reported a 4-day history of fairly significant systemic symptoms which included a severe frontal headache with associated chills and diaphoresis.  He also had periumbilical abdominal pain with associated nausea, vomiting, and watery diarrhea.  He is still having intermittent headache.  He is also reporting persistent watery diarrhea every 2 hours.  This is persisting without even eating much.  He denies any bloody diarrhea.  He has not had any further emesis since admission.  He has had a very extensive workup of the symptoms.  He initially was thought to have sepsis.  He had a leukocytosis on admission.  His procalcitonin was not elevated.  He had a CT of the head as well as a CT of the chest abdomen and pelvis which were negative for any obvious source of infection.  He had a lumbar puncture to rule out meningitis which was negative for any bacterial meningitis.  He is also had panels sent off for viral meningitis which were negative.  The patient was started empirically on broad-spectrum antibiotics and is seen by infectious disease.  Blood cultures and urine cultures were sent off.  He has remained hypertensive but has been afebrile since admission.  He had been taking 20 mg of prednisone chronically for his sarcoidosis.  He denies any cough, shortness of breath, or other systemic symptoms.  He does report some mild photophobia.      Past Medical History:   Diagnosis Date    Asthma     Bell's palsy 2012    Chronic pain  13, height 1.829 m (6'), weight 96.2 kg (212 lb 1.3 oz), SpO2 91 %.    General appearance: alert, cooperative, no distress, appears stated age  Eyes: Anicteric  Head: Normocephalic, without obvious abnormality  Lungs: clear to auscultation bilaterally, Normal Effort  Heart: regular rate and rhythm, normal S1 and S2, no murmurs or rubs  Abdomen: soft, periumbilical tender. Bowel sounds normal. No masses,  no organomegaly.   Extremities: atraumatic, no cyanosis or edema  Skin: warm and dry, no jaundice  Neuro: Grossly intact, A&OX3      Data Review:    Recent Labs     04/26/24  0820 04/27/24  0540 04/28/24  0607   WBC 17.7* 13.2* 9.8   HGB 13.2* 12.5* 12.9*   HCT 38.6* 36.3* 37.8*   MCV 87.4 88.1 88.0    226 249     Recent Labs     04/26/24  0820 04/27/24  0540 04/28/24  0607    140 138   K 3.6 3.6 3.4*    105 104   CO2 18* 21 24   PHOS 2.0*  --   --    BUN 13 10 10   CREATININE 0.9 0.8* 0.6*     Recent Labs     04/26/24  0820 04/27/24  0540 04/28/24  0607   AST 19 20 24   ALT 13 13 20   BILITOT 0.3 <0.2 <0.2   ALKPHOS 100 101 111     No results for input(s): \"LIPASE\", \"AMYLASE\" in the last 72 hours.  No results for input(s): \"PROTIME\", \"INR\" in the last 72 hours.  No results for input(s): \"PTT\" in the last 72 hours.  No results for input(s): \"OCCULTBLD\" in the last 72 hours.    Imaging Studies:    CT HEAD WO CONTRAST   Final Result   No acute intracranial abnormality.         CT CHEST ABDOMEN PELVIS W CONTRAST Additional Contrast? None   Final Result   No acute thoracic/abdominopelvic abnormality.         XR CHEST (2 VW)   Final Result   No significant findings in the chest.                                                    Assessment:   53 y.o.  male with past medical history significant for pulmonary sarcoidosis on chronic corticosteroids, osteoarthritis, hypertension, hyperlipidemia, previous CVA, gouty arthritis, history of DVT, chronic pain disorder, Bell's palsy, asthma, who is seen in  consult for nausea, vomiting, diarrhea.         1) Acute diarrhea- persistent.  Watery and non-bloody.  Mild periumbilical pain. CT scan was negative for any colitis.  Cdiff negative.  GI pathogens sent.  Patient had significant prodrome including headache, myalgias, and chills with diaphoresis which would go along with a viral syndrome.  Viral gastroenteritis would certainly make clinical sense in this situation.  The patient did have an initial leukocytosis but that may have been more related to hemoconcentration.  So far workup for any bacterial cause of sepsis has been negative.    2) Nausea and vomiting- resolved. Suspect viral gastroenteritis.  Some NSAID use at home.  Could consider EGD if no better.     3) Severe frontal headache with mild photophobia-  LP done.  ID folllowing. CT head negative.  HTN persists    4) HTN- persistent    5) Hypokalemia- 3.4    6) Leukocytosis -improving.  ? Hemoconcentration >sepsis.  Procalcitonin not elevated .  Thus far workup for bacterial pathogens is negative.     7) Pulmonary sarcoidosis-  steroid dependent    Recommendations:   1) Supportive care of possible viral gastroenteritis  2) PPI daily  3) Check cryptosporidium, Giardia ag  4) Diet as tolerated  5) May need neuro input re: headache if it persists.   6) BP optimization- ? Relation to HA.   7) Monitor/replace  K  8) ? Stress dose steroids if symptoms persisted and infections ruled out.     Thank you for allowing me to participate in the care of your patient.  Please feel free to contact me with any questions or concerns.     Elias Valerio Jr, DO  [facility]  [phone]

## 2024-04-29 ENCOUNTER — APPOINTMENT (OUTPATIENT)
Dept: MRI IMAGING | Age: 54
DRG: 872 | End: 2024-04-29
Payer: MEDICARE

## 2024-04-29 LAB
ANION GAP SERPL CALCULATED.3IONS-SCNC: 9 MMOL/L (ref 3–16)
BACTERIA UR CULT: NORMAL
BUN SERPL-MCNC: 11 MG/DL (ref 7–20)
CALCIUM SERPL-MCNC: 8.9 MG/DL (ref 8.3–10.6)
CHLORIDE SERPL-SCNC: 103 MMOL/L (ref 99–110)
CO2 SERPL-SCNC: 27 MMOL/L (ref 21–32)
CREAT SERPL-MCNC: 0.7 MG/DL (ref 0.9–1.3)
G LAMBLIA AG STL QL IA: NORMAL
GFR SERPLBLD CREATININE-BSD FMLA CKD-EPI: >90 ML/MIN/{1.73_M2}
GI PATHOGENS PNL STL NAA+PROBE: NORMAL
GLUCOSE SERPL-MCNC: 111 MG/DL (ref 70–99)
MAGNESIUM SERPL-MCNC: 2 MG/DL (ref 1.8–2.4)
POTASSIUM SERPL-SCNC: 3.6 MMOL/L (ref 3.5–5.1)
SODIUM SERPL-SCNC: 139 MMOL/L (ref 136–145)

## 2024-04-29 PROCEDURE — 6360000002 HC RX W HCPCS: Performed by: INTERNAL MEDICINE

## 2024-04-29 PROCEDURE — 70544 MR ANGIOGRAPHY HEAD W/O DYE: CPT

## 2024-04-29 PROCEDURE — 2500000003 HC RX 250 WO HCPCS: Performed by: NURSE PRACTITIONER

## 2024-04-29 PROCEDURE — 6370000000 HC RX 637 (ALT 250 FOR IP): Performed by: NURSE PRACTITIONER

## 2024-04-29 PROCEDURE — 87390 HIV-1 AG IA: CPT

## 2024-04-29 PROCEDURE — 86701 HIV-1ANTIBODY: CPT

## 2024-04-29 PROCEDURE — 2709999900 MRI BRAIN W WO CONTRAST

## 2024-04-29 PROCEDURE — 36415 COLL VENOUS BLD VENIPUNCTURE: CPT

## 2024-04-29 PROCEDURE — 99232 SBSQ HOSP IP/OBS MODERATE 35: CPT | Performed by: INTERNAL MEDICINE

## 2024-04-29 PROCEDURE — 6360000004 HC RX CONTRAST MEDICATION: Performed by: NURSE PRACTITIONER

## 2024-04-29 PROCEDURE — 72148 MRI LUMBAR SPINE W/O DYE: CPT

## 2024-04-29 PROCEDURE — 80048 BASIC METABOLIC PNL TOTAL CA: CPT

## 2024-04-29 PROCEDURE — 83735 ASSAY OF MAGNESIUM: CPT

## 2024-04-29 PROCEDURE — 86702 HIV-2 ANTIBODY: CPT

## 2024-04-29 PROCEDURE — A9579 GAD-BASE MR CONTRAST NOS,1ML: HCPCS | Performed by: NURSE PRACTITIONER

## 2024-04-29 PROCEDURE — 2060000000 HC ICU INTERMEDIATE R&B

## 2024-04-29 PROCEDURE — 2580000003 HC RX 258: Performed by: NURSE PRACTITIONER

## 2024-04-29 PROCEDURE — 6370000000 HC RX 637 (ALT 250 FOR IP): Performed by: INTERNAL MEDICINE

## 2024-04-29 PROCEDURE — 2580000003 HC RX 258: Performed by: INTERNAL MEDICINE

## 2024-04-29 RX ORDER — VALPROIC ACID 250 MG/1
250 CAPSULE, LIQUID FILLED ORAL 2 TIMES DAILY
Status: DISCONTINUED | OUTPATIENT
Start: 2024-04-29 | End: 2024-04-30 | Stop reason: HOSPADM

## 2024-04-29 RX ADMIN — GADOTERIDOL 17 ML: 279.3 INJECTION, SOLUTION INTRAVENOUS at 10:10

## 2024-04-29 RX ADMIN — VALPROIC ACID 250 MG: 250 CAPSULE ORAL at 12:08

## 2024-04-29 RX ADMIN — SODIUM CHLORIDE: 9 INJECTION, SOLUTION INTRAVENOUS at 10:29

## 2024-04-29 RX ADMIN — PREDNISONE 20 MG: 20 TABLET ORAL at 08:30

## 2024-04-29 RX ADMIN — INDOMETHACIN 50 MG: 25 CAPSULE ORAL at 04:25

## 2024-04-29 RX ADMIN — ALLOPURINOL 100 MG: 100 TABLET ORAL at 08:29

## 2024-04-29 RX ADMIN — ACETAMINOPHEN 650 MG: 325 TABLET ORAL at 18:25

## 2024-04-29 RX ADMIN — TRAMADOL HYDROCHLORIDE 25 MG: 50 TABLET ORAL at 06:51

## 2024-04-29 RX ADMIN — SODIUM CHLORIDE: 9 INJECTION, SOLUTION INTRAVENOUS at 18:24

## 2024-04-29 RX ADMIN — GABAPENTIN 100 MG: 100 CAPSULE ORAL at 21:34

## 2024-04-29 RX ADMIN — ACETAMINOPHEN 650 MG: 325 TABLET ORAL at 04:25

## 2024-04-29 RX ADMIN — VANCOMYCIN HYDROCHLORIDE 1500 MG: 1.5 INJECTION, POWDER, LYOPHILIZED, FOR SOLUTION INTRAVENOUS at 10:30

## 2024-04-29 RX ADMIN — GABAPENTIN 100 MG: 100 CAPSULE ORAL at 08:30

## 2024-04-29 RX ADMIN — ENOXAPARIN SODIUM 40 MG: 100 INJECTION SUBCUTANEOUS at 21:36

## 2024-04-29 RX ADMIN — ONDANSETRON 4 MG: 2 INJECTION INTRAMUSCULAR; INTRAVENOUS at 04:25

## 2024-04-29 RX ADMIN — SODIUM CHLORIDE, PRESERVATIVE FREE 10 ML: 5 INJECTION INTRAVENOUS at 10:31

## 2024-04-29 RX ADMIN — TRAMADOL HYDROCHLORIDE 25 MG: 50 TABLET ORAL at 17:19

## 2024-04-29 RX ADMIN — VALPROATE SODIUM 500 MG: 100 INJECTION, SOLUTION INTRAVENOUS at 04:24

## 2024-04-29 RX ADMIN — GABAPENTIN 100 MG: 100 CAPSULE ORAL at 15:37

## 2024-04-29 RX ADMIN — AMLODIPINE BESYLATE 10 MG: 5 TABLET ORAL at 21:36

## 2024-04-29 RX ADMIN — PANTOPRAZOLE SODIUM 40 MG: 40 TABLET, DELAYED RELEASE ORAL at 06:48

## 2024-04-29 ASSESSMENT — PAIN SCALES - GENERAL
PAINLEVEL_OUTOF10: 0
PAINLEVEL_OUTOF10: 8
PAINLEVEL_OUTOF10: 4
PAINLEVEL_OUTOF10: 0
PAINLEVEL_OUTOF10: 7

## 2024-04-29 ASSESSMENT — PAIN DESCRIPTION - DESCRIPTORS
DESCRIPTORS: ACHING
DESCRIPTORS: ACHING
DESCRIPTORS: ACHING;TENDER

## 2024-04-29 ASSESSMENT — PAIN DESCRIPTION - LOCATION
LOCATION: ABDOMEN;HEAD
LOCATION: HEAD
LOCATION: HEAD;ABDOMEN
LOCATION: ABDOMEN;HEAD

## 2024-04-29 ASSESSMENT — PAIN DESCRIPTION - ORIENTATION
ORIENTATION: RIGHT;LEFT
ORIENTATION: RIGHT;LEFT

## 2024-04-29 NOTE — PROGRESS NOTES
Order Status: Completed Updated: 04/26/24 1819    Report SEE IMAGE   Meningitis/Encephalitis Panel, CSF [[id number]] Collected: 04/26/24 1604   Order Status: Completed Specimen: CSF Updated: 04/26/24 1818    Meningitis Encephalitis Panel --    Meningitis Encephalitis Panel PCR Result: Not Detected  Patients with a suspicion of cryptococcal meningitis should be tested  for cryptococcal antigen.\"  See additional report for complete Meningitis Encephalitis Panel.   Narrative:     ORDER#: [id number]                          ORDERED BY: LEXI GALLO  SOURCE: CSF (Spinal Fluid)                 COLLECTED:  04/26/24 16:04  ANTIBIOTICS AT JERR[facility].:                      RECEIVED :  04/26/24 16:24   COVID-19, Rapid [[id number]] Collected: 04/26/24 0725   Order Status: Completed Specimen: Nasopharyngeal Swab Updated: 04/26/24 0754    SARS-CoV-2, NAAT Not Detected    Comment: Rapid NAAT:   Negative results should be treated as presumptive and,  if inconsistent with clinical signs and symptoms or necessary for  patient management, should be tested with an alternative molecular  assay. Negative results do not preclude SARS-CoV-2 infection and  should not be used as the sole basis for patient management decisions.  This test has been authorized by the FDA under an Emergency Use  Authorization (EUA) for use by authorized laboratories.    Fact sheet for Healthcare Providers:  https://www.fda.gov/media/893130/download  Fact sheet for Patients: https://www.fda.gov/media/652098/download    METHODOLOGY: Isothermal Nucleic Acid Amplification      Rapid influenza A/B antigens [[id number]] Collected: 04/26/24 0725   Order Status: Completed Specimen: Nasopharyngeal Updated: 04/26/24 0754    Rapid Influenza A Ag Negative    Rapid Influenza B Ag Negative   Respiratory Panel, Molecular, with COVID-19 (Restricted: peds pts or suitable admitted adults) [[id number]] Collected: 04/26/24 0000   Order Status: Canceled Specimen: Nasopharyngeal         Result   No acute thoracic/abdominopelvic abnormality.         XR CHEST (2 VW)   Final Result   No significant findings in the chest.               All the pertinent images and reports for the current Hospitalization were reviewed by me     Scheduled Meds:   gabapentin  100 mg Oral TID    cefTRIAXone (ROCEPHIN) IV  2,000 mg IntraVENous Q24H    sodium chloride flush  5-40 mL IntraVENous 2 times per day    enoxaparin  40 mg SubCUTAneous Nightly    allopurinol  100 mg Oral Daily    amLODIPine  10 mg Oral Nightly    pantoprazole  40 mg Oral QAM AC    predniSONE  20 mg Oral Daily    vancomycin (VANCOCIN) intermittent dosing (placeholder)   Other RX Placeholder    vancomycin  1,500 mg IntraVENous Q12H       Continuous Infusions:   sodium chloride      sodium chloride 100 mL/hr at 04/29/24 1029       PRN Meds:  indomethacin, traMADol, sodium chloride flush, sodium chloride, potassium chloride **OR** potassium alternative oral replacement **OR** potassium chloride, magnesium sulfate, ondansetron **OR** ondansetron, polyethylene glycol, acetaminophen **OR** acetaminophen, fluticasone      Assessment:     Patient Active Problem List   Diagnosis Code    Bell's palsy G51.0    Sarcoidosis D86.9    Depression F32.A    Hypertension I10    Gout M10.9    Chronic pain disorder G89.4    Mood swings R45.86    Diabetes mellitus, type II (HCC) E11.9    Long term systemic steroid user Z79.52    Hemorrhagic cystitis N30.91    Constipation K59.00    Nausea R11.0    DDD (degenerative disc disease), cervical M50.30    Neck pain M54.2    Severe sepsis (HCC) A41.9, R65.20    Acute nonintractable headache R51.9    Overweight (BMI 25.0-29.9) E66.3    Screening for HIV (human immunodeficiency virus) Z11.4    History of gout Z87.39    Hematuria R31.9    Mixed hyperlipidemia E78.2    History of Bell's palsy Z86.69    Nonintractable headache R51.9    Myalgia M79.10    Hypomagnesemia E83.42    Hypophosphatemia E83.39    Malaise R53.81        ICD-10-CM

## 2024-04-29 NOTE — PROGRESS NOTES
Neurology Progress Note    Updates  Continues to complain of HA, 7/10.    Still having back pain.   No other acute events overnight.    VPA was helpful for about 6-7 hours.      He did mention to me that for a long time he has been getting 1-2 headaches per week w/ light sensitivity and blurry vision.  He usually would just sleep them off.      Medical History:  Past Medical History:   Diagnosis Date    Asthma     Bell's palsy 09/05/2012    Chronic pain disorder 10/29/2010    DVT (deep venous thrombosis) ([facility])     Gouty arthritis 02/15/2011    Hyperlipidemia     Hypertension     Ischemic stroke (HCC)     Long term (current) use of systemic steroids 01/20/2016    Mood disorder 07/13/2011    Osteoarthritis     Sarcoidosis 03/07/2010     Past Surgical History:   Procedure Laterality Date    CYSTOSCOPY N/A 4/8/2019    CYSTOSCOPY performed by Panchito Womack MD at [facility] OR    ENDOSCOPY, COLON, DIAGNOSTIC       Scheduled Meds:   gabapentin  100 mg Oral TID    cefTRIAXone (ROCEPHIN) IV  2,000 mg IntraVENous Q24H    sodium chloride flush  5-40 mL IntraVENous 2 times per day    enoxaparin  40 mg SubCUTAneous Nightly    allopurinol  100 mg Oral Daily    amLODIPine  10 mg Oral Nightly    pantoprazole  40 mg Oral QAM AC    predniSONE  20 mg Oral Daily    vancomycin (VANCOCIN) intermittent dosing (placeholder)   Other RX Placeholder    vancomycin  1,500 mg IntraVENous Q12H     Continuous Infusions:   sodium chloride      sodium chloride Stopped (04/28/24 0834)     Medications Prior to Admission:   vilazodone HCl (VIIBRYD) 20 MG TABS, Take 1 tablet by mouth daily (Patient not taking: Reported on 4/26/2024)  predniSONE (DELTASONE) 20 MG tablet, Take 20 mg by mouth daily (Patient not taking: Reported on 4/26/2024)  amLODIPine (NORVASC) 10 MG tablet, Take 1 tablet by mouth daily (Patient not taking: Reported on 4/26/2024)    No Known Allergies    Family History   Problem Relation Age of Onset    Heart Disease Mother     Diabetes

## 2024-04-29 NOTE — PROGRESS NOTES
Gastroenterology Progress Note    Jarrett Orozco is a 53 y.o. male patient.  Principal Problem:    Severe sepsis (HCC)  Active Problems:    Long term systemic steroid user    Acute nonintractable headache    Overweight (BMI 25.0-29.9)    Screening for HIV (human immunodeficiency virus)    History of gout    Hematuria    Mixed hyperlipidemia    History of Bell's palsy    Nonintractable headache    Myalgia    Hypomagnesemia    Hypophosphatemia    Malaise  Resolved Problems:    * No resolved hospital problems. *      SUBJECTIVE:  He has had no further diarrhea, nausea, or vomiting.  Still has the headaches.  No fevers.    Current Facility-Administered Medications: indomethacin (INDOCIN) capsule 50 mg, 50 mg, Oral, TID PRN  gabapentin (NEURONTIN) capsule 100 mg, 100 mg, Oral, TID  cefTRIAXone (ROCEPHIN) 2,000 mg in sodium chloride 0.9 % 50 mL IVPB (mini-bag), 2,000 mg, IntraVENous, Q24H  traMADol (ULTRAM) tablet 25 mg, 25 mg, Oral, Q6H PRN  sodium chloride flush 0.9 % injection 5-40 mL, 5-40 mL, IntraVENous, 2 times per day  sodium chloride flush 0.9 % injection 5-40 mL, 5-40 mL, IntraVENous, PRN  0.9 % sodium chloride infusion, , IntraVENous, PRN  potassium chloride (KLOR-CON M) extended release tablet 40 mEq, 40 mEq, Oral, PRN **OR** potassium bicarb-citric acid (EFFER-K) effervescent tablet 40 mEq, 40 mEq, Oral, PRN **OR** potassium chloride 10 mEq/100 mL IVPB (Peripheral Line), 10 mEq, IntraVENous, PRN  magnesium sulfate 2000 mg in 50 mL IVPB premix, 2,000 mg, IntraVENous, PRN  enoxaparin (LOVENOX) injection 40 mg, 40 mg, SubCUTAneous, Nightly  ondansetron (ZOFRAN-ODT) disintegrating tablet 4 mg, 4 mg, Oral, Q8H PRN **OR** ondansetron (ZOFRAN) injection 4 mg, 4 mg, IntraVENous, Q6H PRN  polyethylene glycol (GLYCOLAX) packet 17 g, 17 g, Oral, Daily PRN  acetaminophen (TYLENOL) tablet 650 mg, 650 mg, Oral, Q6H PRN **OR** acetaminophen (TYLENOL) suppository 650 mg, 650 mg, Rectal, Q6H PRN  allopurinol (ZYLOPRIM)  diarrhea over the past 4 days associated with headache and chills and diaphoresis.    Also with periumbilical abdominal pain.  He initially was thought to have sepsis.  He had a leukocytosis on admission.  His procalcitonin was not elevated.  He had a CT of the head as well as a CT of the chest abdomen and pelvis which were negative for any obvious source of infection.  He had a lumbar puncture to rule out meningitis which was negative for any bacterial meningitis.  He also had panels sent off for viral meningitis which were negative.  The patient was started empirically on broad-spectrum antibiotics and is seen by infectious disease.  Blood cultures and urine cultures were sent off.  He has remained hypertensive but has been afebrile since admission.  He had been taking 20 mg of prednisone chronically for his sarcoidosis.  He denies any cough, shortness of breath, or other systemic symptoms.  He does report some mild photophobia.   1) Acute diarrhea- persistent.  Watery and non-bloody.  Mild periumbilical pain. CT scan was negative for any colitis.  Cdiff negative.  GI pathogens negative.  Patient had significant prodrome including headache, myalgias, and chills with diaphoresis which would go along with a viral syndrome.       2) Nausea and vomiting- resolved. Suspect viral gastroenteritis.  Some NSAID use at home.        3) Severe frontal headache with mild photophobia-  LP done.  ID folllowing. CT head negative.  HTN persists     4) HTN- persistent     5) Hypokalemia- 3.4     6) Leukocytosis -improving.  ? Hemoconcentration >sepsis.  Procalcitonin not elevated .  Thus far workup for bacterial pathogens is negative.      7) Pulmonary sarcoidosis-  steroid dependent     Recommendations:   1) His nausea, vomiting and diarrhea are better, again going along with a viral gastroenteritis.  Given resolution of symptoms, will sign off.  Please call with questions.      Thank you for allowing me to participate in the care

## 2024-04-29 NOTE — PROGRESS NOTES
V2.0    [facility] Progress Note      Name:  Jarrett Orozco /Age/Sex: 1970  (53 y.o. male)   MRN & CSN:  [id number] & [id number] Encounter Date/Time: 2024 10:58 AM EDT   Location:  [id number] PCP: Michael Swenson MD     Attending:Horacio Johns MD       Hospital Day: 4    Assessment and Recommendations   Jarrett Orozco is a 53 y.o. male who presents with Severe sepsis (HCC)      Plan:          Sepsis, with headache blurry vision nonspecific symptoms of diarrhea nausea and vomiting, patient is on chronic steroids for sarcoidosis, ID consulted on admission started on ceftriaxone acyclovir and vancomycin, CSF fluid negative ID discontinued acyclovir adjusted ceftriaxone dose.    Sarcoidosis continue steroids  Nausea vomiting antiemetics, continue to be an issue associated with abdominal pain CT scan on admission negative, pantoprazole  Headache, ongoing, no cord tenderness, consulted neurology for further recommendations  Diarrhea appears functional, C. difficile negative GI consulted  Generalized weakness due to above        Diet ADULT DIET; Regular   DVT Prophylaxis [] Lovenox, []  Heparin, [] SCDs, [] Ambulation,  [] Eliquis, [] Xarelto  [] Coumadin   Code Status Full Code             Personally reviewed Lab Studies and Imaging     Discussed management of the case with infectious disease    Telemetry strip reviewed by myself no ST elevation    Imaging that was interpreted personally includes MRI and results no acute infarct      Medical Decision Making:  The following items were considered in medical decision making:  Discussion of patient care with other providers  Reviewed clinical lab tests  Reviewed radiology tests  Reviewed other diagnostic tests/interventions  Independent review of radiologic images  Microbiology cultures and other micro tests reviewed      Subjective:     Chief Complaint: Headache nausea vomiting diarrhea     Jarrett Orozco is a 53 y.o. male who presents with headache  \"LACTA\" in the last 72 hours.  BNP: No results for input(s): \"PROBNP\" in the last 72 hours.  UA:  Lab Results   Component Value Date/Time    NITRU Negative 04/26/2024 08:20 AM    COLORU Yellow 04/26/2024 08:20 AM    PHUR 7.0 04/27/2024 05:44 PM    LABCAST 0-1 Coarsely granular, 0-1 Finely granular 07/04/2012 11:45 AM    WBCUA 5 04/26/2024 08:20 AM    RBCUA 28 04/26/2024 08:20 AM    MUCUS Present 12/01/2022 10:39 AM    YEAST Present 12/28/2020 06:35 PM    BACTERIA None Seen 04/26/2024 08:20 AM    CLARITYU Clear 04/26/2024 08:20 AM    SPECGRAV >=1.030 04/26/2024 08:20 AM    LEUKOCYTESUR TRACE 04/26/2024 08:20 AM    UROBILINOGEN 1.0 04/26/2024 08:20 AM    BILIRUBINUR Negative 04/26/2024 08:20 AM    BILIRUBINUR NEGATIVE-by confirmatory method 10/18/2011 07:12 PM    BLOODU LARGE 04/26/2024 08:20 AM    GLUCOSEU Negative 04/26/2024 08:20 AM    GLUCOSEU NEGATIVE 10/18/2011 07:12 PM    KETUA 15 04/26/2024 08:20 AM    AMORPHOUS 3+ 12/28/2020 06:35 PM     Urine Cultures:   Lab Results   Component Value Date/Time    LABURIN No growth at 18 to 36 hours 04/27/2024 05:44 PM     Blood Cultures:   Lab Results   Component Value Date/Time    BC  04/27/2024 02:29 PM     No Growth to date.  Any change in status will be called.     Lab Results   Component Value Date/Time    BLOODCULT2  04/28/2024 06:07 AM     No Growth to date.  Any change in status will be called.     Organism:   Lab Results   Component Value Date/Time    ORG Beta Strep Group C 12/01/2022 10:39 AM         Electronically signed by Horacio Johns MD on 4/29/2024 at 10:58 AM  Comment: Please note this report has been produced using speech recognition software and may contain errors related to that system including errors in grammar, punctuation, and spelling, as well as words and phrases that may be inappropriate. If there are any questions or concerns, please feel free to contact the dictating provider for clarification.

## 2024-04-29 NOTE — PROGRESS NOTES
Paged REBECCA Castanon  pt amitted 4/26 with sepsis/ uncontrolled pain, hx sarcoidosis on predinsone, takes vicodin for gout pain at home, requesting something stronger for pain, pt shaking and crying from pain, thanks Susan VILCHIS

## 2024-04-29 NOTE — PLAN OF CARE
Problem: Pain  Goal: Verbalizes/displays adequate comfort level or baseline comfort level  4/28/2024 2238 by Susan Coleman, RN  Outcome: Progressing  4/28/2024 0927 by Stephany Yates RN  Outcome: Progressing     Problem: Safety - Adult  Goal: Free from fall injury  4/28/2024 2238 by Susan Coleman, RN  Outcome: Progressing  4/28/2024 0927 by Stephany Yates RN  Outcome: Progressing

## 2024-04-29 NOTE — CARE COORDINATION
Case Management Assessment  Initial Evaluation    Date/Time of Evaluation: 4/29/2024 1:18 PM  Assessment Completed by: Yolanda Basilio RN    If patient is discharged prior to next notation, then this note serves as note for discharge by case management.    Patient Name: Jarrett Orozco                   YOB: 1970  Diagnosis: Severe sepsis (HCC) [A41.9, R65.20]                   Date / Time: 4/26/2024  7:12 AM    Patient Admission Status: Inpatient   Readmission Risk (Low < 19, Mod (19-27), High > 27): Readmission Risk Score: 8.8    Current PCP: Michael Swenson MD  PCP verified by CM? Yes    Chart Reviewed: Yes      History Provided by: Patient  Patient Orientation: Alert and Oriented    Patient Cognition: Alert    Hospitalization in the last 30 days (Readmission):  No    If yes, Readmission Assessment in CM Navigator will be completed.    Advance Directives:      Code Status: Full Code   Patient's Primary Decision Maker is: Legal Next of Kin    Primary Decision Maker: Lee Orozco - Child - [phone]    Primary Decision Maker: Jarrett Orozco Jr. - Child - [phone]    Discharge Planning:    Patient lives with: Alone Type of Home: Apartment (8 steps with railing up to home)  Primary Care Giver: Self  Patient Support Systems include: Family Members, Friends/Neighbors   Current Financial resources: Medicaid, Medicare  Current community resources: None  Current services prior to admission: None            Current DME:              Type of Home Care services:  None    ADLS  Prior functional level: Independent in ADLs/IADLs  Current functional level: Assistance with the following:, Mobility, Toileting, Dressing    PT AM-PAC:   /24  OT AM-PAC:   /24    Family can provide assistance at DC: No  Would you like Case Management to discuss the discharge plan with any other family members/significant others, and if so, who? No  Plans to Return to Present Housing: Yes  Other Identified  Patient Representative Agree with the Discharge Plan? Yes    Yolanda Basilio RN  Case Management Department  Ph: [phone]

## 2024-04-29 NOTE — PROGRESS NOTES
Comprehensive Nutrition Assessment    Type and Reason for Visit:  Initial, Positive Nutrition Screen    Nutrition Recommendations/Plan:   Continue regular diet, monitor intakes     Malnutrition Assessment:  Malnutrition Status:  No malnutrition (04/29/24 1400)    Context:  Acute Illness       Nutrition Assessment:    MST 2. Pt presenting w/ severe sepsis. Pt w/ no recent hx of significant wt loss. Pt reports that his poor intake began on admission. He feels hungry but is only able to eat small amounts of food. Will continue to monitor intake. No nutrition invervention given short duration of poor intake.    Nutrition Related Findings:    111 glucose, Wound Type: None       Current Nutrition Intake & Therapies:    Average Meal Intake: 26-50%  Average Supplements Intake: None Ordered  ADULT DIET; Regular    Anthropometric Measures:  Height: 182.9 cm (6')  Ideal Body Weight (IBW): 178 lbs (81 kg)       Current Body Weight: 87.9 kg (193 lb 12.6 oz),   IBW.    Current BMI (kg/m2): 26.3                               Estimated Daily Nutrient Needs:  Energy Requirements Based On: Kcal/kg  Weight Used for Energy Requirements: Current  Energy (kcal/day): 1758 - 2198 (20 - 25 kcal/kg)  Weight Used for Protein Requirements: Ideal  Protein (g/day): 97 - 121 (1.2 - 1.5 g/kg IBW)  Method Used for Fluid Requirements: 1 ml/kcal  Fluid (ml/day): or per provider    Nutrition Diagnosis:   Inadequate oral intake related to early satiety as evidenced by intake 26-50%    Nutrition Interventions:   Food and/or Nutrient Delivery: Continue Current Diet  Nutrition Education/Counseling: Education not indicated  Coordination of Nutrition Care: Continue to monitor while inpatient       Goals:     Goals: Meet at least 75% of estimated needs, by next RD assessment       Nutrition Monitoring and Evaluation:   Behavioral-Environmental Outcomes: None Identified  Food/Nutrient Intake Outcomes: Food and Nutrient Intake  Physical Signs/Symptoms  Outcomes: Biochemical Data, Weight, Nutrition Focused Physical Findings    Discharge Planning:    No discharge needs at this time     Checo Dorsey RD  Contact: [phone]

## 2024-04-30 VITALS
WEIGHT: 212.52 LBS | BODY MASS INDEX: 28.79 KG/M2 | HEIGHT: 72 IN | TEMPERATURE: 97.9 F | HEART RATE: 106 BPM | OXYGEN SATURATION: 94 % | SYSTOLIC BLOOD PRESSURE: 152 MMHG | RESPIRATION RATE: 17 BRPM | DIASTOLIC BLOOD PRESSURE: 84 MMHG

## 2024-04-30 PROBLEM — D72.9 NEUTROPHILIA: Status: ACTIVE | Noted: 2024-04-30

## 2024-04-30 PROBLEM — I10 UNCONTROLLED HYPERTENSION: Status: ACTIVE | Noted: 2024-04-30

## 2024-04-30 LAB
ANION GAP SERPL CALCULATED.3IONS-SCNC: 11 MMOL/L (ref 3–16)
BUN SERPL-MCNC: 14 MG/DL (ref 7–20)
CALCIUM SERPL-MCNC: 9 MG/DL (ref 8.3–10.6)
CALPROTECTIN STL-MCNT: 34 UG/G
CHLORIDE SERPL-SCNC: 103 MMOL/L (ref 99–110)
CO2 SERPL-SCNC: 24 MMOL/L (ref 21–32)
CREAT SERPL-MCNC: 0.8 MG/DL (ref 0.9–1.3)
GFR SERPLBLD CREATININE-BSD FMLA CKD-EPI: >90 ML/MIN/{1.73_M2}
GLUCOSE SERPL-MCNC: 122 MG/DL (ref 70–99)
HIV 1+2 AB+HIV1 P24 AG SERPL QL IA: NORMAL
HIV 2 AB SERPL QL IA: NORMAL
HIV1 AB SERPL QL IA: NORMAL
HIV1 P24 AG SERPL QL IA: NORMAL
POTASSIUM SERPL-SCNC: 3.5 MMOL/L (ref 3.5–5.1)
SODIUM SERPL-SCNC: 138 MMOL/L (ref 136–145)

## 2024-04-30 PROCEDURE — 6360000002 HC RX W HCPCS: Performed by: INTERNAL MEDICINE

## 2024-04-30 PROCEDURE — 6370000000 HC RX 637 (ALT 250 FOR IP): Performed by: NURSE PRACTITIONER

## 2024-04-30 PROCEDURE — 2580000003 HC RX 258: Performed by: INTERNAL MEDICINE

## 2024-04-30 PROCEDURE — 94760 N-INVAS EAR/PLS OXIMETRY 1: CPT

## 2024-04-30 PROCEDURE — 80048 BASIC METABOLIC PNL TOTAL CA: CPT

## 2024-04-30 PROCEDURE — 6370000000 HC RX 637 (ALT 250 FOR IP): Performed by: INTERNAL MEDICINE

## 2024-04-30 PROCEDURE — 36415 COLL VENOUS BLD VENIPUNCTURE: CPT

## 2024-04-30 RX ORDER — VALPROIC ACID 250 MG/1
250 CAPSULE, LIQUID FILLED ORAL 2 TIMES DAILY
Qty: 60 CAPSULE | Refills: 0 | Status: SHIPPED | OUTPATIENT
Start: 2024-04-30

## 2024-04-30 RX ORDER — LOSARTAN POTASSIUM 25 MG/1
25 TABLET ORAL DAILY
Qty: 30 TABLET | Refills: 0 | Status: SHIPPED | OUTPATIENT
Start: 2024-05-01

## 2024-04-30 RX ORDER — LOSARTAN POTASSIUM 25 MG/1
25 TABLET ORAL DAILY
Status: DISCONTINUED | OUTPATIENT
Start: 2024-04-30 | End: 2024-04-30 | Stop reason: HOSPADM

## 2024-04-30 RX ORDER — PANTOPRAZOLE SODIUM 40 MG/1
40 TABLET, DELAYED RELEASE ORAL
Qty: 30 TABLET | Refills: 0 | Status: SHIPPED | OUTPATIENT
Start: 2024-05-01

## 2024-04-30 RX ADMIN — TRAMADOL HYDROCHLORIDE 25 MG: 50 TABLET ORAL at 12:22

## 2024-04-30 RX ADMIN — GABAPENTIN 100 MG: 100 CAPSULE ORAL at 08:55

## 2024-04-30 RX ADMIN — ALLOPURINOL 100 MG: 100 TABLET ORAL at 08:55

## 2024-04-30 RX ADMIN — CEFTRIAXONE SODIUM 2000 MG: 2 INJECTION, POWDER, FOR SOLUTION INTRAMUSCULAR; INTRAVENOUS at 01:17

## 2024-04-30 RX ADMIN — SODIUM CHLORIDE: 9 INJECTION, SOLUTION INTRAVENOUS at 01:16

## 2024-04-30 RX ADMIN — VALPROIC ACID 250 MG: 250 CAPSULE ORAL at 08:55

## 2024-04-30 RX ADMIN — PANTOPRAZOLE SODIUM 40 MG: 40 TABLET, DELAYED RELEASE ORAL at 06:37

## 2024-04-30 RX ADMIN — SODIUM CHLORIDE, PRESERVATIVE FREE 10 ML: 5 INJECTION INTRAVENOUS at 08:56

## 2024-04-30 RX ADMIN — VALPROIC ACID 250 MG: 250 CAPSULE ORAL at 00:23

## 2024-04-30 RX ADMIN — GABAPENTIN 100 MG: 100 CAPSULE ORAL at 14:23

## 2024-04-30 RX ADMIN — PREDNISONE 20 MG: 20 TABLET ORAL at 08:55

## 2024-04-30 RX ADMIN — LOSARTAN POTASSIUM 25 MG: 25 TABLET, FILM COATED ORAL at 14:20

## 2024-04-30 ASSESSMENT — PAIN DESCRIPTION - LOCATION: LOCATION: HEAD

## 2024-04-30 ASSESSMENT — PAIN SCALES - GENERAL
PAINLEVEL_OUTOF10: 8
PAINLEVEL_OUTOF10: 0

## 2024-04-30 NOTE — PROGRESS NOTES
V2.0    [facility] Progress Note      Name:  Jarrett Orozco /Age/Sex: 1970  (53 y.o. male)   MRN & CSN:  [id number] & [id number] Encounter Date/Time: 2024 7:41 AM EDT   Location:  [id number] PCP: Michael Swenson MD     Attending:Horacio Johns MD       Hospital Day: 5    Assessment and Recommendations   Jarrett Orozco is a 53 y.o. male who presents with Severe sepsis (HCC)      Plan:  Possible sepsis of unknown source, with headache blurry vision nonspecific symptoms of diarrhea nausea and vomiting, patient is on chronic steroids for sarcoidosis, ID consulted on admission started on ceftriaxone acyclovir and vancomycin, at this time antibiotics discontinued  Sarcoidosis continue steroids  Nausea vomiting antiemetics, continue to be an issue associated with abdominal pain CT scan on admission negative, pantoprazole  Headache, ongoing, no cord tenderness, consulted neurology for further recommendations valproate ordered  Abnormal MRI lumbar spine neurosurgery consulted  Diarrhea appears functional, C. difficile negative GI consulted significantly improved  Essential hypertension on amlodipine adding losartan  Generalized weakness due to above      Diet ADULT DIET; Regular   DVT Prophylaxis [] Lovenox, []  Heparin, [] SCDs, [] Ambulation,  [] Eliquis, [] Xarelto  [] Coumadin   Code Status Full Code             Personally reviewed Lab Studies and Imaging     Discussed management of the case with infectious disease    Telemetry strip reviewed by myself no ST elevation      Drugs that require monitoring for toxicity include steroids and the method of monitoring was blood sugar    Medical Decision Making:  The following items were considered in medical decision making:  Discussion of patient care with other providers  Reviewed clinical lab tests  Reviewed radiology tests  Reviewed other diagnostic tests/interventions  Independent review of radiologic images  Microbiology cultures and other micro  visualized portion of the orbits demonstrate no acute abnormality. SINUSES: The visualized paranasal sinuses and mastoid air cells demonstrate no acute abnormality. SOFT TISSUES/SKULL:  No acute abnormality of the visualized skull or soft tissues.     No acute intracranial abnormality.     CT CHEST ABDOMEN PELVIS W CONTRAST Additional Contrast? None    Result Date: 4/26/2024  EXAMINATION: CT OF THE CHEST, ABDOMEN, AND PELVIS WITH CONTRAST 4/26/2024 10:07 am TECHNIQUE: CT of the chest, abdomen and pelvis was performed with the administration of intravenous contrast. Multiplanar reformatted images are provided for review. Automated exposure control, iterative reconstruction, and/or weight based adjustment of the mA/kV was utilized to reduce the radiation dose to as low as reasonably achievable. COMPARISON: 04/25/2021 HISTORY: ORDERING SYSTEM PROVIDED HISTORY: Shortness of breath, productive cough, malaise, immunosuppressed on medications for sarcoidosis, concern for occult pneumonia not visualized on x-ray imaging; Also with generalized abdominal pain, N/V/D x 5 days TECHNOLOGIST PROVIDED HISTORY: Reason for exam:->Shortness of breath, productive cough, malaise, immunosuppressed on medications for sarcoidosis, concern for occult pneumonia not visualized on x-ray imaging; Also with generalized abdominal pain, N/V/D x 5 days Additional Contrast?->None Decision Support Exception - unselect if not a suspected or confirmed emergency medical condition->Emergency Medical Condition (MA) Reason for Exam: Shortness of breath, productive cough, malaise, immunosuppressed on medications for sarcoidosis, concern for occult pneumonia not visualized on x-ray imaging; Also with generalized abdominal pain, N/V/D x 5 days Relevant Medical/Surgical History: none FINDINGS: Chest: Mediastinum: The central airways are patent.  There is no hilar or mediastinal adenopathy. Lungs/pleura: There is no consolidation, pneumothorax or effusion.  results for input(s): \"PROBNP\" in the last 72 hours.  UA:  Lab Results   Component Value Date/Time    NITRU Negative 04/26/2024 08:20 AM    COLORU Yellow 04/26/2024 08:20 AM    PHUR 7.0 04/27/2024 05:44 PM    LABCAST 0-1 Coarsely granular, 0-1 Finely granular 07/04/2012 11:45 AM    WBCUA 5 04/26/2024 08:20 AM    RBCUA 28 04/26/2024 08:20 AM    MUCUS Present 12/01/2022 10:39 AM    YEAST Present 12/28/2020 06:35 PM    BACTERIA None Seen 04/26/2024 08:20 AM    CLARITYU Clear 04/26/2024 08:20 AM    SPECGRAV >=1.030 04/26/2024 08:20 AM    LEUKOCYTESUR TRACE 04/26/2024 08:20 AM    UROBILINOGEN 1.0 04/26/2024 08:20 AM    BILIRUBINUR Negative 04/26/2024 08:20 AM    BILIRUBINUR NEGATIVE-by confirmatory method 10/18/2011 07:12 PM    BLOODU LARGE 04/26/2024 08:20 AM    GLUCOSEU Negative 04/26/2024 08:20 AM    GLUCOSEU NEGATIVE 10/18/2011 07:12 PM    KETUA 15 04/26/2024 08:20 AM    AMORPHOUS 3+ 12/28/2020 06:35 PM     Urine Cultures:   Lab Results   Component Value Date/Time    LABURIN No growth at 18 to 36 hours 04/27/2024 05:44 PM     Blood Cultures:   Lab Results   Component Value Date/Time    BC  04/27/2024 02:29 PM     No Growth to date.  Any change in status will be called.     Lab Results   Component Value Date/Time    BLOODCULT2  04/28/2024 06:07 AM     No Growth to date.  Any change in status will be called.     Organism:   Lab Results   Component Value Date/Time    ORG Beta Strep Group C 12/01/2022 10:39 AM         Electronically signed by Horacio Johns MD on 4/30/2024 at 7:41 AM  Comment: Please note this report has been produced using speech recognition software and may contain errors related to that system including errors in grammar, punctuation, and spelling, as well as words and phrases that may be inappropriate. If there are any questions or concerns, please feel free to contact the dictating provider for clarification.

## 2024-04-30 NOTE — PLAN OF CARE
Problem: Discharge Planning  Goal: Discharge to home or other facility with appropriate resources  4/30/2024 0332 by Jayne Moulton, RN  Outcome: Progressing     Problem: Neurosensory - Adult  Goal: Achieves stable or improved neurological status  Outcome: Progressing     Problem: Pain  Goal: Verbalizes/displays adequate comfort level or baseline comfort level  4/30/2024 0332 by Jayne Moulton, RN  Outcome: Progressing     Problem: Safety - Adult  Goal: Free from fall injury  Outcome: Progressing

## 2024-04-30 NOTE — DISCHARGE SUMMARY
Hospital Medicine Discharge Summary    Patient ID: Jarrett Orozco      Patient's PCP: Michael Swenson MD    Admit Date: 4/26/2024     Discharge Date:   04/30/2024    Admitting Provider: Horacio Johns MD     Discharge Provider: Horacio Johns MD     Discharge Diagnoses:       Active Hospital Problems    Diagnosis     Neutrophilia [D72.9]     Uncontrolled hypertension [I10]     Acute nonintractable headache [R51.9]     Overweight (BMI 25.0-29.9) [E66.3]     Screening for HIV (human immunodeficiency virus) [Z11.4]     History of gout [Z87.39]     Hematuria [R31.9]     Mixed hyperlipidemia [E78.2]     History of Bell's palsy [Z86.69]     Nonintractable headache [R51.9]     Myalgia [M79.10]     Hypomagnesemia [E83.42]     Hypophosphatemia [E83.39]     Malaise [R53.81]     Severe sepsis (HCC) [A41.9, R65.20]     Long term systemic steroid user [Z79.52]        The patient was seen and examined on day of discharge and this discharge summary is in conjunction with any daily progress note from day of discharge.    Hospital Course:       From HPI:\"53 y.o. male who presented to [facility] with headache autophobia episodes of fever and chills nausea vomiting and episodes of diarrhea not associated with any abdominal pain denies chest pain or shortness of breath, no black stool, denies any neck pain, worsening for the last 3 days overall, no recent sick contact, patient is on chronic steroids, emergency department found to have leukocytosis, fever, LP done, started on IV antibiotics discussed with EDMD agree with plan to admit for further management and treatment \"      Plan:  Possible sepsis of unknown source, with headache blurry vision nonspecific symptoms of diarrhea nausea and vomiting, patient is on chronic steroids for sarcoidosis, ID consulted on admission started on ceftriaxone acyclovir and vancomycin, at this time antibiotics discontinued per ID recommendation continue to be afebrile significantly  head.         MRI LUMBAR SPINE WO CONTRAST   Final Result   Moderate-sized central disc extrusion at L5-S1 with compression of both the   S1 nerve roots within the lateral recess.  Mild left foraminal stenosis is   additionally noted at this level.         CT HEAD WO CONTRAST   Final Result   No acute intracranial abnormality.         CT CHEST ABDOMEN PELVIS W CONTRAST Additional Contrast? None   Final Result   No acute thoracic/abdominopelvic abnormality.         XR CHEST (2 VW)   Final Result   No significant findings in the chest.                Consults:     IP CONSULT TO INFECTIOUS DISEASES  IP CONSULT TO SPIRITUAL SERVICES  IP CONSULT TO SOCIAL WORK  IP CONSULT TO GI  IP CONSULT TO NEUROLOGY  IP CONSULT TO NEUROSURGERY    Disposition: Home    Condition at Discharge: Stable    Discharge Instructions/Follow-up: PCP    Code Status:  Full Code     Activity: activity as tolerated    Diet: Cardiac      Discharge Medications:     Current Discharge Medication List             Details   losartan (COZAAR) 25 MG tablet Take 1 tablet by mouth daily  Qty: 30 tablet, Refills: 0      valproic acid (DEPAKENE) 250 MG capsule Take 1 capsule by mouth 2 times daily  Qty: 60 capsule, Refills: 0      pantoprazole (PROTONIX) 40 MG tablet Take 1 tablet by mouth every morning (before breakfast)  Qty: 30 tablet, Refills: 0                Details   vilazodone HCl (VIIBRYD) 20 MG TABS Take 1 tablet by mouth daily      predniSONE (DELTASONE) 20 MG tablet Take 20 mg by mouth daily      amLODIPine (NORVASC) 10 MG tablet Take 1 tablet by mouth daily  Qty: 30 tablet, Refills: 5    Associated Diagnoses: Essential hypertension             Time Spent on discharge: 32 minutes in the examination, evaluation, counseling and review of medications and discharge plan.      Signed:    Horacio Johns MD   4/30/2024      Thank you Michael Swenson MD for the opportunity to be involved in this patient's care. If you have any questions or concerns,

## 2024-04-30 NOTE — PLAN OF CARE
Problem: Discharge Planning  Goal: Discharge to home or other facility with appropriate resources  4/30/2024 1511 by Sindy Hassan RN  Outcome: Progressing  4/30/2024 0332 by Jayne Moulton RN  Outcome: Progressing  4/30/2024 0331 by Jayne Moulton RN  Outcome: Progressing     Problem: Pain  Goal: Verbalizes/displays adequate comfort level or baseline comfort level  4/30/2024 1511 by Sindy Hassan RN  Outcome: Progressing  4/30/2024 0332 by Jayne Moulton RN  Outcome: Progressing  4/30/2024 0331 by Jayne Moulton RN  Outcome: Progressing     Problem: Safety - Adult  Goal: Free from fall injury  4/30/2024 1511 by Sindy Hassan RN  Outcome: Progressing  4/30/2024 0332 by Jayne Moulton RN  Outcome: Progressing     Problem: Neurosensory - Adult  Goal: Achieves stable or improved neurological status  4/30/2024 1511 by Sindy Hassan RN  Outcome: Progressing  4/30/2024 0332 by Jayne Moulton RN  Outcome: Progressing

## 2024-04-30 NOTE — CONSULTS
Neurosurgery Consult:    Patient Name: Jarrett Orozco YOB: 1970   Sex: Male Age: 53 yrs     Medical Record Number: [id number] Acct Number: [id number]   Room Number: K8V-1714/5266-01 Hospital Day: Hospital Day: 5     Requesting physician: Horacio Johns MD    Reason for consultation: abnormal lumbar MRI    History of present illness: Patient is a 53 y.o. male who  has a past medical history of Asthma, Bell's palsy, Chronic pain disorder, DVT (deep venous thrombosis) (HCC), Gouty arthritis, Hyperlipidemia, Hypertension, Ischemic stroke (HCC), Long term (current) use of systemic steroids, Mood disorder, Osteoarthritis, and Sarcoidosis.  He presented due to headache, vomiting, dizziness, etc. On Friday.  He reports since being here that has all improved.  DENIES ANY BACK OR LEG PAIN.           ROS:   Denies chest pain  Denies shortness of breath  Denies changes in bowel or bladder function    VITAL SIGNS   BP (!) 166/101   Pulse 95   Temp 98.1 °F (36.7 °C) (Oral)   Resp 18   Ht 1.829 m (6')   Wt 96.4 kg (212 lb 8.4 oz)   SpO2 98%   BMI 28.82 kg/m²    Height Height: 182.9 cm (6')   Weight Weight - Scale: 96.4 kg (212 lb 8.4 oz)        Allergies No Known Allergies   NPO Status ADULT DIET; Regular   Isolation No active isolations     MEDICAL HISTORY   Past Medical History       Diagnosis Date    Asthma     Bell's palsy 09/05/2012    Chronic pain disorder 10/29/2010    DVT (deep venous thrombosis) (HCC)     Gouty arthritis 02/15/2011    Hyperlipidemia     Hypertension     Ischemic stroke (HCC)     Long term (current) use of systemic steroids 01/20/2016    Mood disorder 07/13/2011    Osteoarthritis     Sarcoidosis 03/07/2010      Surgical History    has a past surgical history that includes Endoscopy, colon, diagnostic and Cystoscopy (N/A, 4/8/2019).   Social History   Social History     Occupational History    Occupation: [employer]ary at [employer]    Tobacco Use    Smoking status: Former      Current packs/day: 0.25     Average packs/day: 0.3 packs/day for 2.0 years (0.5 ttl pk-yrs)     Types: Cigarettes    Smokeless tobacco: Never   Vaping Use    Vaping Use: Never used   Substance and Sexual Activity    Alcohol use: Not Currently     Alcohol/week: 0.0 standard drinks of alcohol    Drug use: Yes     Types: Marijuana (Weed)    Sexual activity: Yes     Partners: Female        The medical history was obtained from the patient & the medical records. The nursing notes, primary physician's notes, and old charts (if applicable) were reviewed.    LABS   Basic Metabolic Profile Recent Labs     04/28/24  0607 04/29/24  0455 04/30/24  0547    139 138    103 103   CO2 24 27 24   BUN 10 11 14   CREATININE 0.6* 0.7* 0.8*   GLUCOSE 103* 111* 122*   MG  --  2.00  --       Complete Blood Count Recent Labs     04/28/24  0607   WBC 9.8   RBC 4.30      Coagulation Studies No results for input(s): \"PTT\", \"INR\" in the last 72 hours.    Invalid input(s): \"PLATELETS\", \"PROA\", \"PT\", \"PTTA\"     MEDICATIONS   Inpatient Medications     losartan, 25 mg, Oral, Daily    valproic acid, 250 mg, Oral, BID    gabapentin, 100 mg, Oral, TID    sodium chloride flush, 5-40 mL, IntraVENous, 2 times per day    enoxaparin, 40 mg, SubCUTAneous, Nightly    allopurinol, 100 mg, Oral, Daily    amLODIPine, 10 mg, Oral, Nightly    pantoprazole, 40 mg, Oral, QAM AC    predniSONE, 20 mg, Oral, Daily   Infusions    sodium chloride 5 mL/hr at 04/30/24 0116      PRN   indomethacin, 50 mg, Oral, TID PRN  traMADol, 25 mg, Oral, Q6H PRN  sodium chloride flush, 5-40 mL, IntraVENous, PRN  sodium chloride, , IntraVENous, PRN  potassium chloride, 40 mEq, Oral, PRN   Or  potassium alternative oral replacement, 40 mEq, Oral, PRN   Or  potassium chloride, 10 mEq, IntraVENous, PRN  magnesium sulfate, 2,000 mg, IntraVENous, PRN  ondansetron, 4 mg, Oral, Q8H PRN   Or  ondansetron, 4 mg, IntraVENous, Q6H PRN  polyethylene glycol, 17 g, Oral, Daily

## 2024-04-30 NOTE — PROGRESS NOTES
Pt discharged home with home health care per MD order. Medications were received from retail pharmacy. All questions and concerns were answered, pt denies any further needs at this time.

## 2024-04-30 NOTE — CARE COORDINATION
Await Neurosurgery eval.    Patient plans on returning home with [facility]. Will need home care orders.     Electronically signed by Yolanda Basilio RN Case Management on 4/30/2024 at 1:05 PM

## 2024-04-30 NOTE — DISCHARGE INSTR - COC
Continuity of Care Form    Patient Name: Jarrett Orozco   :  1970  MRN:  [id number]    Admit date:  2024  Discharge date:  2024    Code Status Order: Full Code   Advance Directives:     Admitting Physician:  Horacio Johns MD  PCP: Michael Swenson MD    Discharging Nurse: Sindy Hassan RN  Discharging Hospital Unit/Room#: [id number]-7070/5266-01  Discharging Unit Phone Number: [phone]    Emergency Contact:   Extended Emergency Contact Information  Primary Emergency Contact: Lee Orozco  Mobile Phone: [phone]  Relation: Child  Secondary Emergency Contact: Jarrett Orozco Jr.  Mobile Phone: [phone]  Relation: Child    Past Surgical History:  Past Surgical History:   Procedure Laterality Date    CYSTOSCOPY N/A 2019    CYSTOSCOPY performed by Panchito Womack MD at [facility] OR    ENDOSCOPY, COLON, DIAGNOSTIC         Immunization History:   Immunization History   Administered Date(s) Administered    TDaP, ADACEL (age 10y-64y), BOOSTRIX (age 10y+), IM, 0.5mL 2021       Active Problems:  Patient Active Problem List   Diagnosis Code    Bell's palsy G51.0    Sarcoidosis D86.9    Depression F32.A    Hypertension I10    Gout M10.9    Chronic pain disorder G89.4    Mood swings R45.86    Diabetes mellitus, type II (HCC) E11.9    Long term systemic steroid user Z79.52    Hemorrhagic cystitis N30.91    Constipation K59.00    Nausea R11.0    DDD (degenerative disc disease), cervical M50.30    Neck pain M54.2    Severe sepsis (HCC) A41.9, R65.20    Acute nonintractable headache R51.9    Overweight (BMI 25.0-29.9) E66.3    Screening for HIV (human immunodeficiency virus) Z11.4    History of gout Z87.39    Hematuria R31.9    Mixed hyperlipidemia E78.2    History of Bell's palsy Z86.69    Nonintractable headache R51.9    Myalgia M79.10    Hypomagnesemia E83.42    Hypophosphatemia E83.39    Malaise R53.81    Neutrophilia D72.9    Uncontrolled hypertension I10       Isolation/Infection:

## 2024-04-30 NOTE — CARE COORDINATION
Case Management Discharge Note          Date / Time of Note: 4/30/2024 3:36 PM                  Patient Name: Jarrett Orozco   YOB: 1970  Diagnosis: Severe sepsis (HCC) [A41.9, R65.20]   Date / Time: 4/26/2024  7:12 AM    Financial:  Payor: MEDICARE / Plan: MEDICARE PART A AND B / Product Type: *No Product type* /      Pharmacy:    [id number] [address] - P [phone] - F [phone]  [address] [facility] [address]  Phone: [phone] Fax: [phone]    [id number] [place], [place] - [address] - P [phone] - F [phone]  [address] [facility] [address]  Phone: [phone] Fax: [phone]    [facility] [id number] [facility] [address] - P [phone] - F [phone]  [address] [facility] [address]  Phone: [phone] Fax: [phone]      Assistance purchasing medications?: Potential Assistance Purchasing Medications: No  Assistance provided by Case Management: None at this time    DISCHARGE Disposition: Home with Home Health Care    Home Care:  Home Care ordered at discharge: Yes  Home Care Agency: [facility]  Phone: [phone]  Fax: [phone]  Orders faxed: Yes    Transportation:  Transportation PLAN for discharge: friend or family  Mode of Transport: Private Car  Time of Transport: TBD, patient to arrange transport himself    IMM Completed:   Yes, Case management has presented and reviewed IMM letter #2.       IMM Letter date given:: 04/30/24  IMM Letter time given:: 1300.   Patient and/or family/POA verbalized understanding of their medicare rights and appeal process if needed. Patient and/or family/POA has signed, initialed and placed the date and time on IMM letter #2 on the the appropriate lines. Copy of letter offered and they are aware that the original copy of IMM letter #2 is available prior to discharge from the paper chart on the

## 2024-04-30 NOTE — PROGRESS NOTES
Physician Progress Note      PATIENT:               JUAN PHAM  [facility] #:                  [id number]  :                       1970  ADMIT DATE:       2024 7:12 AM  DISCH DATE:  RESPONDING  PROVIDER #:        Horacio Johns MD          QUERY TEXT:    Patient admitted with fatigue and headache.  Noted documentation of sepsis in   H&P on 24. If possible, please document in progress notes and discharge   summary the source of sepsis:      The medical record reflects the following:  Risk Factors: Hx-sarcoidosis and he is currently taking prednisone 20 mg/day,   asthma, HTN  Clinical Indicators: VS- 99, 120, 29, 171/102 - 165/113.......WBC 17.7, Procal   0.19, lumbar puncture- normal glucose of 67.  CSF was clear with 3 white   cells, 11 red cells.  CSF meningitis PCR panel was negative...CT Chest Abd-No   acute thoracic/abdominopelvic abnormality.  Treatment: Ceftriaxone IV, Vanco IV, ID consult, COVID-19, Influenza A&B, LP,   Stool C diff, BC's, Urine culture, Strep PNA and Legionella antigens, HIV   screen    Thank You,  Zulema Hewitt RN BSN CDS CRCR  [email or web address]  Options provided:  -- Sepsis, present on admission, due to a possible bacterial infection of   unknown source  -- Sepsis, present on admission, due to, Please document source.  -- Sepsis, present on admission, now resolved, due to, Please document source.  -- Other - I will add my own diagnosis  -- Disagree - Not applicable / Not valid  -- Disagree - Clinically unable to determine / Unknown  -- Refer to Clinical Documentation Reviewer    PROVIDER RESPONSE TEXT:    This patient has sepsis which was present on admission due to a possible   bacterial infection of unknown source.    Query created by: Zulema Hewitt on 2024 6:31 AM      Electronically signed by:  Horacio Johns MD 2024 7:39 AM

## 2024-05-01 LAB — BACTERIA BLD CULT: NORMAL

## 2024-05-02 LAB — BACTERIA BLD CULT ORG #2: NORMAL

## 2024-05-03 LAB
BACTERIA CSF CULT: NORMAL
GRAM STN SPEC: NORMAL

## 2024-05-09 NOTE — PROGRESS NOTES
Physician Progress Note      PATIENT:               JUAN PHAM  [facility] #:                  [id number]  :                       1970  ADMIT DATE:       2024 7:12 AM  DISCH DATE:        2024 4:17 PM  RESPONDING  PROVIDER #:        Horacio Johns MD          QUERY TEXT:    Patient admitted with weakness and headache. Noted documentation of sepsis in   H&P on 24. In order to support the diagnosis of sepsis, please include   additional clinical indicators in your documentation.  Or please document if   the diagnosis of sepsis has been ruled out after further study    The medical record reflects the following:  Risk Factors: Hx-sarcoidosis and he is currently taking prednisone 20 mg/day,   asthma, HTN  Clinical Indicators: VS- 99, 120, 29, 171/102 - 165/113.......WBC 17.7, Procal   0.19, lumbar puncture- normal glucose of 67.  CSF was clear with 3 white   cells, 11 red cells.  CSF meningitis PCR panel was negative...CT Chest Abd-No   acute thoracic/abdominopelvic abnormality...Per ID pn on 24- DC IV   vancomycin   DC IV ceftriaxone   WBC normalized  Suspect headache could be related to uncontrolled hypertension   Is on chronic   steroids for sarcoidosis   So far all cultures negative  Treatment: Ceftriaxone IV, Vanco IV, ID consult, COVID-19, Influenza A&B, LP,   Stool C diff, BC's, Urine culture, Strep PNA and Legionella antigens, HIV   screen    Thank You,  Zulema Hewitt RN BSN CDS CRCR  [email or web address]  Options provided:  -- Sepsis present as evidenced by, Please document evidence.  -- Sepsis was ruled out after study  -- Other - I will add my own diagnosis  -- Disagree - Not applicable / Not valid  -- Disagree - Clinically unable to determine / Unknown  -- Refer to Clinical Documentation Reviewer    PROVIDER RESPONSE TEXT:    Sepsis is present as evidenced by lukocytosis and tachycardia    Query created by: Zulema Hewitt on 2024 6:33 AM      Electronically

## (undated) DEVICE — SYRINGE MED 10ML LUERLOCK TIP W/O SFTY DISP

## (undated) DEVICE — SOLUTION SCRB 4OZ 10% POVIDONE IOD ANTIMIC BTL

## (undated) DEVICE — Z DISCONTINUED USE 2271023 SYRINGE IRRIGATION TOOM 70 CC CATH LUER ADPT TIP PLAS STRL

## (undated) DEVICE — Y-TYPE TUR/BLADDER IRRIGATION SET, REGULATING CLAMP

## (undated) DEVICE — GLOVE SURG SZ 75 L12IN FNGR THK87MIL WHT LTX FREE

## (undated) DEVICE — KIT OR ROOM TURNOVER W/STRAP

## (undated) DEVICE — TOWEL,OR,DSP,ST,BLUE,STD,4/PK,20PK/CS: Brand: MEDLINE

## (undated) DEVICE — STERILE SURGICAL LUBRICANT, METAL TUBE: Brand: SURGILUBE

## (undated) DEVICE — SOLUTION IV IRRIG WATER 1000ML POUR BRL 2F7114

## (undated) DEVICE — Z INACTIVE USE 2635503 SOLUTION IRRIG 3000ML ST H2O USP UROMATIC PLAS CONT

## (undated) DEVICE — BAG URO DRN URO-CATCHER

## (undated) DEVICE — Z DISCONTINUED BY MEDLINE USE 2711682 TRAY SKIN PREP DRY W/ PREM GLV

## (undated) DEVICE — SKIN MARKER REGULAR TIP WITH RULER CAP AND LABELS: Brand: DEVON

## (undated) DEVICE — PACK,CYSTOSCOPY,PK III,AURORA: Brand: MEDLINE